# Patient Record
Sex: FEMALE | Race: WHITE | NOT HISPANIC OR LATINO | Employment: OTHER | URBAN - METROPOLITAN AREA
[De-identification: names, ages, dates, MRNs, and addresses within clinical notes are randomized per-mention and may not be internally consistent; named-entity substitution may affect disease eponyms.]

---

## 2017-01-02 ENCOUNTER — HOME CARE/HOSPICE - HEALTHEAST (OUTPATIENT)
Dept: HOME HEALTH SERVICES | Facility: HOME HEALTH | Age: 82
End: 2017-01-02

## 2017-01-03 ENCOUNTER — HOME CARE/HOSPICE - HEALTHEAST (OUTPATIENT)
Dept: HOME HEALTH SERVICES | Facility: HOME HEALTH | Age: 82
End: 2017-01-03

## 2017-01-06 ENCOUNTER — RECORDS - HEALTHEAST (OUTPATIENT)
Dept: ADMINISTRATIVE | Facility: OTHER | Age: 82
End: 2017-01-06

## 2017-01-06 ENCOUNTER — HOME CARE/HOSPICE - HEALTHEAST (OUTPATIENT)
Dept: HOME HEALTH SERVICES | Facility: HOME HEALTH | Age: 82
End: 2017-01-06

## 2017-01-09 ENCOUNTER — HOME CARE/HOSPICE - HEALTHEAST (OUTPATIENT)
Dept: HOME HEALTH SERVICES | Facility: HOME HEALTH | Age: 82
End: 2017-01-09

## 2017-01-13 ENCOUNTER — HOME CARE/HOSPICE - HEALTHEAST (OUTPATIENT)
Dept: HOME HEALTH SERVICES | Facility: HOME HEALTH | Age: 82
End: 2017-01-13

## 2017-01-16 ENCOUNTER — HOME CARE/HOSPICE - HEALTHEAST (OUTPATIENT)
Dept: HOME HEALTH SERVICES | Facility: HOME HEALTH | Age: 82
End: 2017-01-16

## 2017-01-19 ENCOUNTER — HOME CARE/HOSPICE - HEALTHEAST (OUTPATIENT)
Dept: HOME HEALTH SERVICES | Facility: HOME HEALTH | Age: 82
End: 2017-01-19

## 2017-01-23 ENCOUNTER — HOME CARE/HOSPICE - HEALTHEAST (OUTPATIENT)
Dept: HOME HEALTH SERVICES | Facility: HOME HEALTH | Age: 82
End: 2017-01-23

## 2017-01-27 ENCOUNTER — HOME CARE/HOSPICE - HEALTHEAST (OUTPATIENT)
Dept: HOME HEALTH SERVICES | Facility: HOME HEALTH | Age: 82
End: 2017-01-27

## 2017-01-30 ENCOUNTER — HOME CARE/HOSPICE - HEALTHEAST (OUTPATIENT)
Dept: HOME HEALTH SERVICES | Facility: HOME HEALTH | Age: 82
End: 2017-01-30

## 2017-02-03 ENCOUNTER — RECORDS - HEALTHEAST (OUTPATIENT)
Dept: ADMINISTRATIVE | Facility: OTHER | Age: 82
End: 2017-02-03

## 2017-03-15 ENCOUNTER — OFFICE VISIT - HEALTHEAST (OUTPATIENT)
Dept: INTERNAL MEDICINE | Facility: CLINIC | Age: 82
End: 2017-03-15

## 2017-03-15 DIAGNOSIS — I10 ACCELERATED HYPERTENSION: ICD-10-CM

## 2017-03-15 DIAGNOSIS — M81.0 SENILE OSTEOPOROSIS: ICD-10-CM

## 2017-04-07 ENCOUNTER — RECORDS - HEALTHEAST (OUTPATIENT)
Dept: ADMINISTRATIVE | Facility: OTHER | Age: 82
End: 2017-04-07

## 2017-06-29 ENCOUNTER — AMBULATORY - HEALTHEAST (OUTPATIENT)
Dept: INTERNAL MEDICINE | Facility: CLINIC | Age: 82
End: 2017-06-29

## 2017-06-29 DIAGNOSIS — M81.0 SENILE OSTEOPOROSIS: ICD-10-CM

## 2017-07-24 ENCOUNTER — RECORDS - HEALTHEAST (OUTPATIENT)
Dept: ADMINISTRATIVE | Facility: OTHER | Age: 82
End: 2017-07-24

## 2017-08-02 ENCOUNTER — COMMUNICATION - HEALTHEAST (OUTPATIENT)
Dept: INTERNAL MEDICINE | Facility: CLINIC | Age: 82
End: 2017-08-02

## 2017-08-09 ENCOUNTER — RECORDS - HEALTHEAST (OUTPATIENT)
Dept: ADMINISTRATIVE | Facility: OTHER | Age: 82
End: 2017-08-09

## 2017-08-10 ENCOUNTER — COMMUNICATION - HEALTHEAST (OUTPATIENT)
Dept: INTERNAL MEDICINE | Facility: CLINIC | Age: 82
End: 2017-08-10

## 2017-08-15 ENCOUNTER — RECORDS - HEALTHEAST (OUTPATIENT)
Dept: BONE DENSITY | Facility: CLINIC | Age: 82
End: 2017-08-15

## 2017-08-15 ENCOUNTER — OFFICE VISIT - HEALTHEAST (OUTPATIENT)
Dept: INTERNAL MEDICINE | Facility: CLINIC | Age: 82
End: 2017-08-15

## 2017-08-15 ENCOUNTER — RECORDS - HEALTHEAST (OUTPATIENT)
Dept: ADMINISTRATIVE | Facility: OTHER | Age: 82
End: 2017-08-15

## 2017-08-15 DIAGNOSIS — M81.0 AGE-RELATED OSTEOPOROSIS WITHOUT CURRENT PATHOLOGICAL FRACTURE: ICD-10-CM

## 2017-08-15 DIAGNOSIS — I10 ACCELERATED HYPERTENSION: ICD-10-CM

## 2017-08-15 DIAGNOSIS — M48.00 SPINAL STENOSIS: ICD-10-CM

## 2017-08-15 DIAGNOSIS — M81.0 SENILE OSTEOPOROSIS: ICD-10-CM

## 2017-08-15 DIAGNOSIS — Z01.818 PRE-OP EXAM: ICD-10-CM

## 2017-08-15 DIAGNOSIS — I10 HTN (HYPERTENSION): ICD-10-CM

## 2017-08-22 LAB
ATRIAL RATE - MUSE: 66 BPM
DIASTOLIC BLOOD PRESSURE - MUSE: NORMAL MMHG
INTERPRETATION ECG - MUSE: NORMAL
P AXIS - MUSE: 57 DEGREES
PR INTERVAL - MUSE: 188 MS
QRS DURATION - MUSE: 74 MS
QT - MUSE: 398 MS
QTC - MUSE: 417 MS
R AXIS - MUSE: 23 DEGREES
SYSTOLIC BLOOD PRESSURE - MUSE: NORMAL MMHG
T AXIS - MUSE: 25 DEGREES
VENTRICULAR RATE- MUSE: 66 BPM

## 2017-08-24 ENCOUNTER — RECORDS - HEALTHEAST (OUTPATIENT)
Dept: ADMINISTRATIVE | Facility: OTHER | Age: 82
End: 2017-08-24
Payer: COMMERCIAL

## 2017-09-11 ENCOUNTER — RECORDS - HEALTHEAST (OUTPATIENT)
Dept: ADMINISTRATIVE | Facility: OTHER | Age: 82
End: 2017-09-11

## 2017-09-22 ENCOUNTER — OFFICE VISIT - HEALTHEAST (OUTPATIENT)
Dept: INTERNAL MEDICINE | Facility: CLINIC | Age: 82
End: 2017-09-22

## 2017-09-22 DIAGNOSIS — I10 ACCELERATED HYPERTENSION: ICD-10-CM

## 2017-10-06 ENCOUNTER — RECORDS - HEALTHEAST (OUTPATIENT)
Dept: ADMINISTRATIVE | Facility: OTHER | Age: 82
End: 2017-10-06

## 2017-11-01 ENCOUNTER — RECORDS - HEALTHEAST (OUTPATIENT)
Dept: ADMINISTRATIVE | Facility: OTHER | Age: 82
End: 2017-11-01

## 2018-02-15 ENCOUNTER — OFFICE VISIT - HEALTHEAST (OUTPATIENT)
Dept: INTERNAL MEDICINE | Facility: CLINIC | Age: 83
End: 2018-02-15

## 2018-02-15 DIAGNOSIS — M81.0 SENILE OSTEOPOROSIS: ICD-10-CM

## 2018-02-15 DIAGNOSIS — I10 ACCELERATED HYPERTENSION: ICD-10-CM

## 2018-08-07 ENCOUNTER — COMMUNICATION - HEALTHEAST (OUTPATIENT)
Dept: INTERNAL MEDICINE | Facility: CLINIC | Age: 83
End: 2018-08-07

## 2018-08-07 ENCOUNTER — AMBULATORY - HEALTHEAST (OUTPATIENT)
Dept: INTERNAL MEDICINE | Facility: CLINIC | Age: 83
End: 2018-08-07

## 2018-08-07 DIAGNOSIS — M81.0 SENILE OSTEOPOROSIS: ICD-10-CM

## 2018-08-17 ENCOUNTER — AMBULATORY - HEALTHEAST (OUTPATIENT)
Dept: NURSING | Facility: CLINIC | Age: 83
End: 2018-08-17

## 2019-01-29 ENCOUNTER — AMBULATORY - HEALTHEAST (OUTPATIENT)
Dept: INTERNAL MEDICINE | Facility: CLINIC | Age: 84
End: 2019-01-29

## 2019-01-29 DIAGNOSIS — M81.0 SENILE OSTEOPOROSIS: ICD-10-CM

## 2019-04-09 ENCOUNTER — OFFICE VISIT - HEALTHEAST (OUTPATIENT)
Dept: INTERNAL MEDICINE | Facility: CLINIC | Age: 84
End: 2019-04-09

## 2019-04-09 DIAGNOSIS — Z98.890 S/P PARATHYROIDECTOMY: ICD-10-CM

## 2019-04-09 DIAGNOSIS — E66.01 MORBID OBESITY (H): ICD-10-CM

## 2019-04-09 DIAGNOSIS — M81.0 SENILE OSTEOPOROSIS: ICD-10-CM

## 2019-04-09 DIAGNOSIS — I10 ACCELERATED HYPERTENSION: ICD-10-CM

## 2019-04-09 DIAGNOSIS — R41.3 MEMORY LOSS: ICD-10-CM

## 2019-04-09 DIAGNOSIS — Z90.89 S/P PARATHYROIDECTOMY: ICD-10-CM

## 2019-04-09 LAB
ERYTHROCYTE [DISTWIDTH] IN BLOOD BY AUTOMATED COUNT: 13.8 % (ref 11–14.5)
HCT VFR BLD AUTO: 38.1 % (ref 35–47)
HGB BLD-MCNC: 12.8 G/DL (ref 12–16)
MCH RBC QN AUTO: 30.4 PG (ref 27–34)
MCHC RBC AUTO-ENTMCNC: 33.7 G/DL (ref 32–36)
MCV RBC AUTO: 90 FL (ref 80–100)
PLATELET # BLD AUTO: 176 THOU/UL (ref 140–440)
PMV BLD AUTO: 9 FL (ref 7–10)
RBC # BLD AUTO: 4.23 MILL/UL (ref 3.8–5.4)
TSH SERPL DL<=0.005 MIU/L-ACNC: 1.67 UIU/ML (ref 0.3–5)
WBC: 6.4 THOU/UL (ref 4–11)

## 2019-04-09 ASSESSMENT — MIFFLIN-ST. JEOR: SCORE: 1441.29

## 2019-04-10 LAB
25(OH)D3 SERPL-MCNC: 29.6 NG/ML (ref 30–80)
25(OH)D3 SERPL-MCNC: 29.6 NG/ML (ref 30–80)
ALBUMIN SERPL-MCNC: 3.8 G/DL (ref 3.5–5)
ALP SERPL-CCNC: 70 U/L (ref 45–120)
ALT SERPL W P-5'-P-CCNC: 13 U/L (ref 0–45)
ANION GAP SERPL CALCULATED.3IONS-SCNC: 18 MMOL/L (ref 5–18)
AST SERPL W P-5'-P-CCNC: ABNORMAL U/L (ref 0–40)
BILIRUB SERPL-MCNC: 0.5 MG/DL (ref 0–1)
BUN SERPL-MCNC: 19 MG/DL (ref 8–28)
CALCIUM SERPL-MCNC: 10 MG/DL (ref 8.5–10.5)
CALCIUM SERPL-MCNC: 9.9 MG/DL (ref 8.5–10.5)
CHLORIDE BLD-SCNC: 104 MMOL/L (ref 98–107)
CO2 SERPL-SCNC: 20 MMOL/L (ref 22–31)
CREAT SERPL-MCNC: 1.14 MG/DL (ref 0.6–1.1)
CREAT SERPL-MCNC: 1.19 MG/DL (ref 0.6–1.1)
GFR SERPL CREATININE-BSD FRML MDRD: 43 ML/MIN/1.73M2
GFR SERPL CREATININE-BSD FRML MDRD: 46 ML/MIN/1.73M2
GLUCOSE BLD-MCNC: 151 MG/DL (ref 70–125)
PHOSPHATE SERPL-MCNC: ABNORMAL MG/DL (ref 2.5–4.5)
POTASSIUM BLD-SCNC: ABNORMAL MMOL/L (ref 3.5–5)
PROT SERPL-MCNC: 7.2 G/DL (ref 6–8)
PTH-INTACT SERPL-MCNC: 48 PG/ML (ref 10–86)
SODIUM SERPL-SCNC: 142 MMOL/L (ref 136–145)
VIT B12 SERPL-MCNC: NORMAL PG/ML (ref 213–816)

## 2019-04-12 ENCOUNTER — COMMUNICATION - HEALTHEAST (OUTPATIENT)
Dept: INTERNAL MEDICINE | Facility: CLINIC | Age: 84
End: 2019-04-12

## 2019-04-19 ENCOUNTER — HOSPITAL ENCOUNTER (OUTPATIENT)
Dept: MRI IMAGING | Facility: HOSPITAL | Age: 84
Discharge: HOME | End: 2019-04-19
Attending: INTERNAL MEDICINE

## 2019-04-19 DIAGNOSIS — R41.3 MEMORY LOSS: ICD-10-CM

## 2019-04-24 ENCOUNTER — INFUSION - HEALTHEAST (OUTPATIENT)
Dept: INFUSION THERAPY | Facility: CLINIC | Age: 84
End: 2019-04-24

## 2019-04-24 DIAGNOSIS — M81.0 SENILE OSTEOPOROSIS: ICD-10-CM

## 2019-04-24 DIAGNOSIS — S32.030G CLOSED COMPRESSION FRACTURE OF L3 LUMBAR VERTEBRA WITH DELAYED HEALING, SUBSEQUENT ENCOUNTER: ICD-10-CM

## 2019-04-24 LAB
ALBUMIN SERPL-MCNC: 3.8 G/DL (ref 3.5–5)
ALP SERPL-CCNC: 75 U/L (ref 45–120)
ALT SERPL W P-5'-P-CCNC: 14 U/L (ref 0–45)
ANION GAP SERPL CALCULATED.3IONS-SCNC: 9 MMOL/L (ref 5–18)
AST SERPL W P-5'-P-CCNC: 14 U/L (ref 0–40)
BILIRUB SERPL-MCNC: 0.5 MG/DL (ref 0–1)
BUN SERPL-MCNC: 17 MG/DL (ref 8–28)
CALCIUM SERPL-MCNC: 9.6 MG/DL (ref 8.5–10.5)
CHLORIDE BLD-SCNC: 104 MMOL/L (ref 98–107)
CO2 SERPL-SCNC: 26 MMOL/L (ref 22–31)
CREAT SERPL-MCNC: 0.98 MG/DL (ref 0.6–1.1)
GFR SERPL CREATININE-BSD FRML MDRD: 54 ML/MIN/1.73M2
GLUCOSE BLD-MCNC: 100 MG/DL (ref 70–125)
POTASSIUM BLD-SCNC: 3.9 MMOL/L (ref 3.5–5)
PROT SERPL-MCNC: 7.1 G/DL (ref 6–8)
SODIUM SERPL-SCNC: 139 MMOL/L (ref 136–145)

## 2019-06-13 ENCOUNTER — HOSPITAL ENCOUNTER (OUTPATIENT)
Dept: NEUROLOGY | Facility: CLINIC | Age: 84
Setting detail: THERAPIES SERIES
Discharge: STILL ADMITTED OR ADMINISTRATIVE | End: 2019-06-13
Attending: INTERNAL MEDICINE

## 2019-06-13 ENCOUNTER — HOSPITAL ENCOUNTER (OUTPATIENT)
Dept: NEUROLOGY | Facility: CLINIC | Age: 84
Setting detail: THERAPIES SERIES
Discharge: STILL ADMITTED OR ADMINISTRATIVE | End: 2019-06-13

## 2019-06-13 DIAGNOSIS — G31.84 MCI (MILD COGNITIVE IMPAIRMENT): ICD-10-CM

## 2019-06-13 LAB — VIT B12 SERPL-MCNC: <146 PG/ML (ref 213–816)

## 2019-07-31 ENCOUNTER — HOSPITAL ENCOUNTER (OUTPATIENT)
Dept: NEUROLOGY | Facility: CLINIC | Age: 84
Setting detail: THERAPIES SERIES
Discharge: STILL ADMITTED OR ADMINISTRATIVE | End: 2019-07-31
Attending: PSYCHIATRY & NEUROLOGY

## 2019-07-31 DIAGNOSIS — G31.84 MCI (MILD COGNITIVE IMPAIRMENT): ICD-10-CM

## 2019-07-31 DIAGNOSIS — F43.23 ADJUSTMENT DISORDER WITH MIXED ANXIETY AND DEPRESSED MOOD: ICD-10-CM

## 2019-08-05 ENCOUNTER — AMBULATORY - HEALTHEAST (OUTPATIENT)
Dept: OTHER | Facility: CLINIC | Age: 84
End: 2019-08-05

## 2019-08-05 ENCOUNTER — DOCUMENTATION ONLY (OUTPATIENT)
Dept: OTHER | Facility: CLINIC | Age: 84
End: 2019-08-05

## 2019-08-19 ENCOUNTER — RECORDS - HEALTHEAST (OUTPATIENT)
Dept: BONE DENSITY | Facility: CLINIC | Age: 84
End: 2019-08-19

## 2019-08-19 ENCOUNTER — RECORDS - HEALTHEAST (OUTPATIENT)
Dept: ADMINISTRATIVE | Facility: OTHER | Age: 84
End: 2019-08-19

## 2019-08-19 DIAGNOSIS — M81.0 AGE-RELATED OSTEOPOROSIS WITHOUT CURRENT PATHOLOGICAL FRACTURE: ICD-10-CM

## 2019-09-25 ENCOUNTER — OFFICE VISIT - HEALTHEAST (OUTPATIENT)
Dept: INTERNAL MEDICINE | Facility: CLINIC | Age: 84
End: 2019-09-25

## 2019-09-25 DIAGNOSIS — M81.0 SENILE OSTEOPOROSIS: ICD-10-CM

## 2019-09-25 DIAGNOSIS — Z23 NEED FOR INFLUENZA VACCINATION: ICD-10-CM

## 2019-09-25 DIAGNOSIS — Z59.71 INSURANCE COVERAGE PROBLEMS: ICD-10-CM

## 2019-09-25 DIAGNOSIS — R41.3 MEMORY LOSS: ICD-10-CM

## 2019-09-25 DIAGNOSIS — I10 ACCELERATED HYPERTENSION: ICD-10-CM

## 2019-09-25 ASSESSMENT — PATIENT HEALTH QUESTIONNAIRE - PHQ9: SUM OF ALL RESPONSES TO PHQ QUESTIONS 1-9: 3

## 2019-09-26 ENCOUNTER — COMMUNICATION - HEALTHEAST (OUTPATIENT)
Dept: NURSING | Facility: CLINIC | Age: 84
End: 2019-09-26

## 2019-09-27 ENCOUNTER — HOSPITAL ENCOUNTER (OUTPATIENT)
Dept: NEUROLOGY | Facility: CLINIC | Age: 84
Setting detail: THERAPIES SERIES
Discharge: STILL ADMITTED OR ADMINISTRATIVE | End: 2019-09-27
Attending: PSYCHIATRY & NEUROLOGY

## 2019-09-27 DIAGNOSIS — E53.8 B12 DEFICIENCY: ICD-10-CM

## 2019-09-27 LAB — VIT B12 SERPL-MCNC: >2000 PG/ML (ref 213–816)

## 2019-09-30 ENCOUNTER — COMMUNICATION - HEALTHEAST (OUTPATIENT)
Dept: NURSING | Facility: CLINIC | Age: 84
End: 2019-09-30

## 2019-10-03 ENCOUNTER — COMMUNICATION - HEALTHEAST (OUTPATIENT)
Dept: NURSING | Facility: CLINIC | Age: 84
End: 2019-10-03

## 2019-10-03 ASSESSMENT — ACTIVITIES OF DAILY LIVING (ADL): DEPENDENT_IADLS:: TRANSPORTATION

## 2019-10-09 ENCOUNTER — COMMUNICATION - HEALTHEAST (OUTPATIENT)
Dept: NURSING | Facility: CLINIC | Age: 84
End: 2019-10-09

## 2019-10-10 ENCOUNTER — COMMUNICATION - HEALTHEAST (OUTPATIENT)
Dept: CARE COORDINATION | Facility: CLINIC | Age: 84
End: 2019-10-10

## 2019-10-30 ENCOUNTER — COMMUNICATION - HEALTHEAST (OUTPATIENT)
Dept: NURSING | Facility: CLINIC | Age: 84
End: 2019-10-30

## 2019-11-08 ENCOUNTER — COMMUNICATION - HEALTHEAST (OUTPATIENT)
Dept: NURSING | Facility: CLINIC | Age: 84
End: 2019-11-08

## 2019-11-26 ENCOUNTER — COMMUNICATION - HEALTHEAST (OUTPATIENT)
Dept: NURSING | Facility: CLINIC | Age: 84
End: 2019-11-26

## 2020-01-15 ENCOUNTER — COMMUNICATION - HEALTHEAST (OUTPATIENT)
Dept: NURSING | Facility: CLINIC | Age: 85
End: 2020-01-15

## 2020-01-23 ENCOUNTER — COMMUNICATION - HEALTHEAST (OUTPATIENT)
Dept: NURSING | Facility: CLINIC | Age: 85
End: 2020-01-23

## 2020-02-10 ENCOUNTER — COMMUNICATION - HEALTHEAST (OUTPATIENT)
Dept: CARE COORDINATION | Facility: CLINIC | Age: 85
End: 2020-02-10

## 2020-02-26 ENCOUNTER — COMMUNICATION - HEALTHEAST (OUTPATIENT)
Dept: NURSING | Facility: CLINIC | Age: 85
End: 2020-02-26

## 2020-03-13 ENCOUNTER — COMMUNICATION - HEALTHEAST (OUTPATIENT)
Dept: NURSING | Facility: CLINIC | Age: 85
End: 2020-03-13

## 2020-03-18 ENCOUNTER — COMMUNICATION - HEALTHEAST (OUTPATIENT)
Dept: INTERNAL MEDICINE | Facility: CLINIC | Age: 85
End: 2020-03-18

## 2020-04-10 ENCOUNTER — COMMUNICATION - HEALTHEAST (OUTPATIENT)
Dept: CARE COORDINATION | Facility: CLINIC | Age: 85
End: 2020-04-10

## 2020-04-17 ENCOUNTER — COMMUNICATION - HEALTHEAST (OUTPATIENT)
Dept: NURSING | Facility: CLINIC | Age: 85
End: 2020-04-17

## 2020-05-07 ENCOUNTER — COMMUNICATION - HEALTHEAST (OUTPATIENT)
Dept: NURSING | Facility: CLINIC | Age: 85
End: 2020-05-07

## 2020-05-14 ENCOUNTER — COMMUNICATION - HEALTHEAST (OUTPATIENT)
Dept: NURSING | Facility: CLINIC | Age: 85
End: 2020-05-14

## 2020-05-14 ASSESSMENT — ACTIVITIES OF DAILY LIVING (ADL): DEPENDENT_IADLS:: TRANSPORTATION

## 2020-06-09 ENCOUNTER — COMMUNICATION - HEALTHEAST (OUTPATIENT)
Dept: INTERNAL MEDICINE | Facility: CLINIC | Age: 85
End: 2020-06-09

## 2020-06-11 ENCOUNTER — COMMUNICATION - HEALTHEAST (OUTPATIENT)
Dept: NURSING | Facility: CLINIC | Age: 85
End: 2020-06-11

## 2020-07-13 ENCOUNTER — COMMUNICATION - HEALTHEAST (OUTPATIENT)
Dept: NURSING | Facility: CLINIC | Age: 85
End: 2020-07-13

## 2020-08-04 ENCOUNTER — COMMUNICATION - HEALTHEAST (OUTPATIENT)
Dept: CARE COORDINATION | Facility: CLINIC | Age: 85
End: 2020-08-04

## 2020-08-13 ENCOUNTER — COMMUNICATION - HEALTHEAST (OUTPATIENT)
Dept: NURSING | Facility: CLINIC | Age: 85
End: 2020-08-13

## 2020-09-15 ENCOUNTER — COMMUNICATION - HEALTHEAST (OUTPATIENT)
Dept: CARE COORDINATION | Facility: CLINIC | Age: 85
End: 2020-09-15

## 2020-09-21 ENCOUNTER — COMMUNICATION - HEALTHEAST (OUTPATIENT)
Dept: NURSING | Facility: CLINIC | Age: 85
End: 2020-09-21

## 2020-10-09 ENCOUNTER — COMMUNICATION - HEALTHEAST (OUTPATIENT)
Dept: CARE COORDINATION | Facility: CLINIC | Age: 85
End: 2020-10-09

## 2020-10-15 ENCOUNTER — COMMUNICATION - HEALTHEAST (OUTPATIENT)
Dept: NURSING | Facility: CLINIC | Age: 85
End: 2020-10-15

## 2020-11-17 ENCOUNTER — COMMUNICATION - HEALTHEAST (OUTPATIENT)
Dept: NURSING | Facility: CLINIC | Age: 85
End: 2020-11-17

## 2020-11-24 ENCOUNTER — COMMUNICATION - HEALTHEAST (OUTPATIENT)
Dept: CARE COORDINATION | Facility: CLINIC | Age: 85
End: 2020-11-24

## 2020-11-25 ASSESSMENT — ACTIVITIES OF DAILY LIVING (ADL): DEPENDENT_IADLS:: INDEPENDENT

## 2020-12-02 ENCOUNTER — COMMUNICATION - HEALTHEAST (OUTPATIENT)
Dept: NURSING | Facility: CLINIC | Age: 85
End: 2020-12-02

## 2020-12-16 ENCOUNTER — COMMUNICATION - HEALTHEAST (OUTPATIENT)
Dept: NURSING | Facility: CLINIC | Age: 85
End: 2020-12-16

## 2021-01-12 ENCOUNTER — COMMUNICATION - HEALTHEAST (OUTPATIENT)
Dept: NURSING | Facility: CLINIC | Age: 86
End: 2021-01-12

## 2021-01-13 ENCOUNTER — COMMUNICATION - HEALTHEAST (OUTPATIENT)
Dept: INTERNAL MEDICINE | Facility: CLINIC | Age: 86
End: 2021-01-13

## 2021-01-13 ENCOUNTER — COMMUNICATION - HEALTHEAST (OUTPATIENT)
Dept: CARE COORDINATION | Facility: CLINIC | Age: 86
End: 2021-01-13

## 2021-01-13 DIAGNOSIS — H90.8 MIXED CONDUCTIVE AND SENSORINEURAL HEARING LOSS, UNSPECIFIED LATERALITY: ICD-10-CM

## 2021-01-13 ASSESSMENT — ACTIVITIES OF DAILY LIVING (ADL): DEPENDENT_IADLS:: INDEPENDENT

## 2021-01-14 ENCOUNTER — DOCUMENTATION ONLY (OUTPATIENT)
Dept: OTHER | Facility: CLINIC | Age: 86
End: 2021-01-14

## 2021-01-14 ENCOUNTER — AMBULATORY - HEALTHEAST (OUTPATIENT)
Dept: OTHER | Facility: CLINIC | Age: 86
End: 2021-01-14

## 2021-02-17 ENCOUNTER — COMMUNICATION - HEALTHEAST (OUTPATIENT)
Dept: CARE COORDINATION | Facility: CLINIC | Age: 86
End: 2021-02-17

## 2021-03-09 ENCOUNTER — AMBULATORY - HEALTHEAST (OUTPATIENT)
Dept: NURSING | Facility: CLINIC | Age: 86
End: 2021-03-09

## 2021-03-19 ENCOUNTER — COMMUNICATION - HEALTHEAST (OUTPATIENT)
Dept: CARE COORDINATION | Facility: CLINIC | Age: 86
End: 2021-03-19

## 2021-03-30 ENCOUNTER — AMBULATORY - HEALTHEAST (OUTPATIENT)
Dept: NURSING | Facility: CLINIC | Age: 86
End: 2021-03-30

## 2021-04-07 ENCOUNTER — OFFICE VISIT - HEALTHEAST (OUTPATIENT)
Dept: AUDIOLOGY | Facility: CLINIC | Age: 86
End: 2021-04-07

## 2021-04-07 ENCOUNTER — RECORDS - HEALTHEAST (OUTPATIENT)
Dept: ADMINISTRATIVE | Facility: OTHER | Age: 86
End: 2021-04-07

## 2021-04-07 DIAGNOSIS — H90.3 SENSORINEURAL HEARING LOSS (SNHL) OF BOTH EARS: ICD-10-CM

## 2021-04-07 DIAGNOSIS — H93.13 TINNITUS OF BOTH EARS: ICD-10-CM

## 2021-04-16 ENCOUNTER — COMMUNICATION - HEALTHEAST (OUTPATIENT)
Dept: NURSING | Facility: CLINIC | Age: 86
End: 2021-04-16

## 2021-04-29 ENCOUNTER — COMMUNICATION - HEALTHEAST (OUTPATIENT)
Dept: NURSING | Facility: CLINIC | Age: 86
End: 2021-04-29

## 2021-05-03 ENCOUNTER — COMMUNICATION - HEALTHEAST (OUTPATIENT)
Dept: CARE COORDINATION | Facility: CLINIC | Age: 86
End: 2021-05-03

## 2021-05-14 ENCOUNTER — COMMUNICATION - HEALTHEAST (OUTPATIENT)
Dept: NURSING | Facility: CLINIC | Age: 86
End: 2021-05-14

## 2021-05-14 ENCOUNTER — OFFICE VISIT - HEALTHEAST (OUTPATIENT)
Dept: AUDIOLOGY | Facility: CLINIC | Age: 86
End: 2021-05-14

## 2021-05-14 DIAGNOSIS — H90.3 SENSORINEURAL HEARING LOSS (SNHL) OF BOTH EARS: ICD-10-CM

## 2021-05-26 ENCOUNTER — COMMUNICATION - HEALTHEAST (OUTPATIENT)
Dept: NURSING | Facility: CLINIC | Age: 86
End: 2021-05-26

## 2021-05-26 ASSESSMENT — PATIENT HEALTH QUESTIONNAIRE - PHQ9: SUM OF ALL RESPONSES TO PHQ QUESTIONS 1-9: 3

## 2021-05-27 NOTE — PROGRESS NOTES
Assessment/Plan:        1. Osteoporosis Senile  DXA Bone Density Scan    Ambulatory referral to Infusion Therapy   2. Memory loss  Ambulatory referral to Dementia/Memory Loss Clinic    HM2(CBC w/o Differential)    Thyroid Stimulating Hormone (TSH)    Vitamin D, Total (25-Hydroxy)    Comprehensive Metabolic Panel    Magnesium    Vitamin B12    Parathyroid Hormone Intact with Minerals    MR Brain With Without Contrast   3. Accelerated hypertension  hydrALAZINE (APRESOLINE) 25 MG tablet    HM2(CBC w/o Differential)    Thyroid Stimulating Hormone (TSH)    Vitamin D, Total (25-Hydroxy)    Comprehensive Metabolic Panel    Magnesium    Vitamin B12    Parathyroid Hormone Intact with Minerals   4. Morbid obesity (H)     5. S/P parathyroidectomy (H)  HM2(CBC w/o Differential)    Thyroid Stimulating Hormone (TSH)    Vitamin D, Total (25-Hydroxy)    Comprehensive Metabolic Panel    Magnesium    Vitamin B12    Parathyroid Hormone Intact with Minerals     1. Osteoporosis - she will schedule Reclast infusion. She never had bisphosphonate treatment in the past. She will also check with [facility] if they have and patient support program for Prolia in the future.  2. HTN - she will take hydralazine 3x/day since I know she was able to tolerate it and will stop Caliritin for a week. Then will retry Benicar. I think that maybe antihistaminic is making her confusion symptoms. We will cehck labs. She will see Memory loss clinic and have MRI head done.    This note has been dictated using voice recognition software. Any grammatical or context distortions are unintentional and inherent to the software.      Return in about 3 weeks (around 4/30/2019) for Annual physical.    Patient Instructions   We will switch to Reclast infusion once a year. You should call infusion center to schedule.  Take Tylenol 2 pills 1 hour before infusion and 2 pills 4-6 hours after infusion.    Take hydralazine 25 mg 3x/day.    To see Memory loss clinic.    To  "schedule DXA scan in August 2019.    To schedule MRI head.    Stop Clairitin and restart Benicar if Claritin is \" guilty\" for confusion and other symptoms.    Labs today.          Subjective:    Vanda Sanchez is a 83 y.o. female  here for    Chief Complaint   Patient presents with     Osteoporosis Follow Up     Alternative to prolia     Altered Mental Status     Stopped bp meds and resolved     Multiple issues today:  1. Osteoporosis - last prolia done in August 2018. She did not have the dose in February 2019, because was told by her insurance that she has to pay 1200$.   2. HTN, unstable - she stopped Benicar few weeks ago, because was convinced that is giving her more confusion. Hydralazine per her opinion manage her BP for 15 min only and she does not take it. She could not tolerate amlodipine, atenolol, carvedilol, clonidine, enalapril, HCTZ, lisinopril.  3.More confusion and difficulty managing daily activities, low energy level over the last few months, which all got better since she stopped Benicar. She is also taking Claritin daily for sinus allergy symptoms.  4. She had parathyroidectomy few years ago. Taking calcium and vit D now.    Social History     Socioeconomic History     Marital status: Single     Spouse name: Not on file     Number of children: Not on file     Years of education: Not on file     Highest education level: Not on file   Occupational History     Not on file   Social Needs     Financial resource strain: Not on file     Food insecurity:     Worry: Not on file     Inability: Not on file     Transportation needs:     Medical: Not on file     Non-medical: Not on file   Tobacco Use     Smoking status: Never Smoker     Smokeless tobacco: Never Used   Substance and Sexual Activity     Alcohol use: No     Drug use: No     Sexual activity: Never   Lifestyle     Physical activity:     Days per week: Not on file     Minutes per session: Not on file     Stress: Not on file   Relationships     " "Social connections:     Talks on phone: Not on file     Gets together: Not on file     Attends Orthodox service: Not on file     Active member of club or organization: Not on file     Attends meetings of clubs or organizations: Not on file     Relationship status: Not on file     Intimate partner violence:     Fear of current or ex partner: Not on file     Emotionally abused: Not on file     Physically abused: Not on file     Forced sexual activity: Not on file   Other Topics Concern     Not on file   Social History Narrative     Not on file       Family History   Problem Relation Age of Onset     Cancer Mother      Diabetes Mother      Cancer Father      Heart disease Father      Cancer Sister      Hypertension Sister      Cancer Brother      Heart disease Brother      Cancer Maternal Grandmother      Review of Systems:     A 12 point comprehensive review of systems was negative except as noted in HPI.            Objective:    Physical Exam   /82 (Patient Site: Right Arm, Patient Position: Sitting, Cuff Size: Adult Large)   Pulse 84   Ht 5' 5.5\" (1.664 m)   Wt 217 lb 11.2 oz (98.7 kg)   BMI 35.68 kg/m      Constitutional: oriented to person, place, and time, appears well-nourished. No distress.   HENT:   Head: Normocephalic.   Mouth/Throat: Oropharynx is clear and moist.   Eyes: Conjunctivae are normal. Pupils are equal, round, and reactive to light.   Neck: Normal range of motion. Neck supple.   Cardiovascular: Normal rate, regular rhythm and normal heart sounds.    Pulmonary/Chest: Effort normal and breath sounds normal.  Abdominal: Soft. Bowel sounds are normal.   Musculoskeletal: Normal range of motion.   Neurological: alert and oriented to person, place, and time.  Psychiatric:  normal mood and affect.    Patient Active Problem List   Diagnosis     Adenoma Of The Parathyroid Gland     Osteoporosis Senile     S/P parathyroidectomy (H)     Lower extremity weakness     Low back pain     Accelerated " hypertension     Compression fracture of L3 lumbar vertebra (H)     Obesity (BMI 35.0-39.9) with comorbidity (H)       Current Outpatient Medications on File Prior to Visit   Medication Sig Dispense Refill     calcium carbonate 300 mg (750 mg) Chew Chew 750 mg.       cholecalciferol, vitamin D3, (VITAMIN D3) 2,000 unit Tab Take by mouth.       cholecalciferol, vitamin D3, 1,000 unit tablet Take 2,000 Units by mouth daily.       olmesartan (BENICAR) 40 MG tablet Take 1 tablet (40 mg total) by mouth daily. 90 tablet 3     [DISCONTINUED] hydrALAZINE (APRESOLINE) 25 MG tablet Take 1 tablet (25 mg total) by mouth 3 (three) times a day. 270 tablet 3     Current Facility-Administered Medications on File Prior to Visit   Medication Dose Route Frequency Provider Last Rate Last Dose     denosumab 60 mg (PROLIA 60 mg/ml)  60 mg Subcutaneous Q6 Months Adonis Saunders MD   60 mg at 08/17/18 1240     [DISCONTINUED] denosumab 60 mg (PROLIA 60 mg/ml)  60 mg Subcutaneous Q6 Months Adonis Saunders MD Maja Visekruna  4/9/2019

## 2021-05-27 NOTE — PATIENT INSTRUCTIONS - HE
"We will switch to Reclast infusion once a year. You should call infusion center to schedule.  Take Tylenol 2 pills 1 hour before infusion and 2 pills 4-6 hours after infusion.    Take hydralazine 25 mg 3x/day.    To see Memory loss clinic.    To schedule DXA scan in August 2019.    To schedule MRI head.    Stop Clairitin and restart Benicar if Claritin is \" guilty\" for confusion and other symptoms.    Labs today.  "

## 2021-05-28 NOTE — PROGRESS NOTES
"Patient arrived ambulatory this afternoon for Reclast infusion as prescribed. Noted B/P elevated upon arrival/ b/p rechecked after patient had rested in chair and noted improved. Patient reports she has not been taking her b/p medication at home and reports \"it makes my blood pressure go higher\"/ Patient strongly encouraged to call Dr. Saunders for futher follow up/evaluation and she agrees.  Labs drawn as ordered and reviewed. Patient tolerated Reclast infusion. Patient observed for approximately 15-20 minutes after infusion with no complaints/no signs of adverse reaction. IV removed from site and dressing applied. AVS printed and reviewed with patient. Patient encouraged to also confirm with Dr. Saunders regarding oral calcium supplement as she is currently taking (750MG calcium supplement at home daily- recommendation per therapy plan is 500mg calcium two times a day) and patient agrees. Patient reports she does have an appointment with Dr. Saunders next week. Patient discharged to home ambulatory with a friend as .   "

## 2021-05-28 NOTE — PATIENT INSTRUCTIONS - HE
Patient Education   Zoledronic Acid Solution for injection  What is this medicine?  ZOLEDRONIC ACID (SAMANTHA le rita ik AS id) lowers the amount of calcium loss from bone. It is used to treat Paget's disease and osteoporosis in women.  This medicine may be used for other purposes; ask your health care provider or pharmacist if you have questions.  What should I tell my health care provider before I take this medicine?  They need to know if you have any of these conditions:    aspirin-sensitive asthma    cancer, especially if you are receiving medicines used to treat cancer    dental disease or wear dentures    infection    kidney disease    low levels of calcium in the blood    past surgery on the parathyroid gland or intestines    receiving corticosteroids like dexamethasone or prednisone    an unusual or allergic reaction to zoledronic acid, other medicines, foods, dyes, or preservatives    pregnant or trying to get pregnant    breast-feeding  How should I use this medicine?  This medicine is for infusion into a vein. It is given by a health care professional in a hospital or clinic setting.  Talk to your pediatrician regarding the use of this medicine in children. This medicine is not approved for use in children.  Overdosage: If you think you have taken too much of this medicine contact a poison control center or emergency room at once.  NOTE: This medicine is only for you. Do not share this medicine with others.  What if I miss a dose?  It is important not to miss your dose. Call your doctor or health care professional if you are unable to keep an appointment.  What may interact with this medicine?    certain antibiotics given by injection    NSAIDs, medicines for pain and inflammation, like ibuprofen or naproxen    some diuretics like bumetanide, furosemide    teriparatide  This list may not describe all possible interactions. Give your health care provider a list of all the medicines, herbs, non-prescription  drugs, or dietary supplements you use. Also tell them if you smoke, drink alcohol, or use illegal drugs. Some items may interact with your medicine.  What should I watch for while using this medicine?  Visit your doctor or health care professional for regular checkups. It may be some time before you see the benefit from this medicine. Do not stop taking your medicine unless your doctor tells you to. Your doctor may order blood tests or other tests to see how you are doing.  Women should inform their doctor if they wish to become pregnant or think they might be pregnant. There is a potential for serious side effects to an unborn child. Talk to your health care professional or pharmacist for more information.  You should make sure that you get enough calcium and vitamin D while you are taking this medicine. Discuss the foods you eat and the vitamins you take with your health care professional.  Some people who take this medicine have severe bone, joint, and/or muscle pain. This medicine may also increase your risk for jaw problems or a broken thigh bone. Tell your doctor right away if you have severe pain in your jaw, bones, joints, or muscles. Tell your doctor if you have any pain that does not go away or that gets worse.  Tell your dentist and dental surgeon that you are taking this medicine. You should not have major dental surgery while on this medicine. See your dentist to have a dental exam and fix any dental problems before starting this medicine. Take good care of your teeth while on this medicine. Make sure you see your dentist for regular follow-up appointments.  What side effects may I notice from receiving this medicine?  Side effects that you should report to your doctor or health care professional as soon as possible:    allergic reactions like skin rash, itching or hives, swelling of the face, lips, or tongue    anxiety, confusion, or depression    breathing problems    changes in vision    eye  pain    feeling faint or lightheaded, falls    jaw pain, especially after dental work    mouth sores    muscle cramps, stiffness, or weakness    trouble passing urine or change in the amount of urine  Side effects that usually do not require medical attention (report to your doctor or health care professional if they continue or are bothersome):    bone, joint, or muscle pain    constipation    diarrhea    fever    hair loss    irritation at site where injected    loss of appetite    nausea, vomiting    stomach upset    trouble sleeping    trouble swallowing    weak or tired  This list may not describe all possible side effects. Call your doctor for medical advice about side effects. You may report side effects to FDA at 8-360-FDA-2518.  Where should I keep my medicine?  This drug is given in a hospital or clinic and will not be stored at home.  NOTE:This sheet is a summary. It may not cover all possible information. If you have questions about this medicine, talk to your doctor, pharmacist, or health care provider. Copyright  2015 Gold Standard
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
## 2021-05-29 NOTE — PROGRESS NOTES
8/22/17 note: Patient noted to have hyponatremia associated with low pre-albumin.  I suspect poor oral intake is in large part due to the patient's low serum sodium.  I did discuss this matter with staff and I have asked that the patient's facility be contacted so as to alert the patient's primary physician.  In the meantime I have contacted the patient's daughter to discuss this matter as well.
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
## 2021-05-30 VITALS — BODY MASS INDEX: 32.84 KG/M2 | WEIGHT: 209.7 LBS

## 2021-05-31 VITALS — WEIGHT: 215 LBS | BODY MASS INDEX: 33.67 KG/M2

## 2021-05-31 VITALS — BODY MASS INDEX: 33.88 KG/M2 | WEIGHT: 216.3 LBS

## 2021-05-31 NOTE — PROGRESS NOTES
The patient was seen for a neuropsychological evaluation for the purposes of diagnostic clarification and treatment planning. 130 minutes of face-to-face testing were provided by this writer. An additional 50 minutes were spent scoring and compiling test results.The patient was cooperative with testing. No concerns were brought to my attention. Please see Dr. Harvey's report for a detailed description of the charges and interpretation and integration of the findings.
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
## 2021-06-01 VITALS — WEIGHT: 217.3 LBS | BODY MASS INDEX: 34.03 KG/M2

## 2021-06-01 NOTE — PROGRESS NOTES
The Clinic Care Guide spoke with the patient over the phone at the request of the PCP to discuss possible clinic care coordination enrollment. Clinic care coordination was described to the patient and immediate needs were discussed. The patient agreed to enrollment in clinic care coordination and future appointments were scheduled for an RN care coordination assessment on 10/3/19 (by phone due to hard to get around.)       She is calling to see about dental and she thinks that medicare will pay for it because she has had   But patient expressed that it is hard for her to get rides sometimes to the clinic

## 2021-06-01 NOTE — PROGRESS NOTES
Assessment/Plan:        1. Accelerated hypertension     2. Osteoporosis Senile     3. Insurance coverage problems  Ambulatory referral to Care Management (Primary Care)   4. Need for influenza vaccination  Influenza High Dose, Seasonal 65+ yrs   5. Memory loss         Return in about 6 months (around 3/25/2020) for Annual physical.    Patient Instructions   DXA in 8/2021.  We will see each other in 6 months. You will have new insurance then and we can see if we can do Prolia again.    Our Clinic  will call you to help with insurance questions.    Flu shot today.      Chief Complaint   Patient presents with     Test Results     Insurance Question's     Multiple issues today:  1. Osteoporosis - last prolia done in August 2018. She did not have the dose in February 2019, because was told by her insurance that she has to pay 1200$. AT that time we switched to reclast, and she had infusion in April 2019. She reports severe myalgia and all over the body pain for 3 months. She does not want to have Reclast again. She hopes she will have new insurance next year and we can get back on Prolia treatment.  2. HTN, BP looks good today - she decided to stop all her medications and she is not taking Benicar and hydralazine for months. She could not tolerate amlodipine, atenolol, carvedilol, clonidine, enalapril, HCTZ, lisinopril.  3.Memory loss - she completed neuropsych testing and will see Dr Vines in 2 days.  4. She had parathyroidectomy few years ago. Taking calcium and vit D now.  5. She has problem with establishing dental insurance. There is a lot of misunderstanding why Medicare cannot cover her dental work. I will connect her with our .    /70   Pulse 81   Wt 216 lb 6.4 oz (98.2 kg)   SpO2 97%   BMI 35.46 kg/m    25 minutes spent with the patient and more then 50 % of the time in counseling.  This note has been dictated using voice recognition software. Any grammatical or context  distortions are unintentional and inherent to the software      Patient Active Problem List   Diagnosis     Adenoma Of The Parathyroid Gland     Osteoporosis Senile     S/P parathyroidectomy (H)     Accelerated hypertension     Compression fracture of L3 vertebra (H)     Obesity (BMI 35.0-39.9) with comorbidity (H)     Memory loss       Current Outpatient Medications on File Prior to Visit   Medication Sig Dispense Refill     calcium carbonate 300 mg (750 mg) Chew Chew 750 mg.       cholecalciferol, vitamin D3, (VITAMIN D3) 2,000 unit Tab Take by mouth.       [DISCONTINUED] cholecalciferol, vitamin D3, 1,000 unit tablet Take 2,000 Units by mouth daily.       [DISCONTINUED] hydrALAZINE (APRESOLINE) 25 MG tablet Take 1 tablet (25 mg total) by mouth 3 (three) times a day. 270 tablet 3     [DISCONTINUED] olmesartan (BENICAR) 40 MG tablet Take 1 tablet (40 mg total) by mouth daily. 90 tablet 3     Current Facility-Administered Medications on File Prior to Visit   Medication Dose Route Frequency Provider Last Rate Last Dose     [DISCONTINUED] denosumab 60 mg (PROLIA 60 mg/ml)  60 mg Subcutaneous Q6 Months Adonis Saunders MD   60 mg at 08/17/18 4540

## 2021-06-01 NOTE — PROGRESS NOTES
Assessment / Impression   1.  Concern regarding acquired cognitive impairment  2.  No cognitive diagnosis per current evaluation  3.  B12 deficiency currently on B12 replacement  4.  History of mild gait instability improved on B12 replacement      Plan:   1.  Recheck B12 level to document adequate oral absorption  2.  Wellness programming  3.  Patient is to call or return with any concerns regarding changes in cognition  4.  In 1 year the patient will return at which time we will have a further conversation as to whether repeat neuropsychometric testing or further cognitive assessment is warranted.    Patient returns for results of neuropsychometric testing.  She is accompanied by her friend Tamara.  Prior imaging of the brain was fairly nonspecific.  Screening blood test did reveal a low B12 level for which the patient is currently taking oral replacement.  Neuropsychometric testing is within normal limits with average to superior performances on all cognitive test provided.  I explained these test results and the limitations of the diagnostic assessment done to date.  At this point in time I am not recommending further biomarker testing.  The patient has not had a repeat vitamin B12 level drawn and this will be drawn today.  Of interest, she does report feeling better cognitively and has noted improvement in motor function since beginning B12 replacement.    Total time for evaluation one hour with majority time spent counseling.      Subjective:     HPI: Vanda Sanchez is a 84 y.o. female with above-noted diagnoses who returns for follow-up.  The patient reports demonstrating improvement in strength and energy since beginning B12 replacement orally.  She also reports improvement cognitively since discontinuing antihypertensive therapies earlier this year.  No new complaints are noted.          Review of Systems:          As above        Objective:     Vitals:    09/27/19 1255 09/27/19 1258 09/27/19 1300   BP:  (!) 245/111 (!) 258/125 (!) 234/110   Pulse: 72 80 79       No results found for this or any previous visit (from the past 24 hour(s)).    Physical Exam: Deferred today.  From a cognitive perspective, however, the patient is neatly groomed casually dressed and seated for evaluation.  She is alert attentive and socially appropriate.  No clear evidence of cognitive difficulties noted during conversation.  Affect is pleasant mood would appear to be congruent.

## 2021-06-01 NOTE — PROGRESS NOTES
Scheduled Follow Up Call: Attempt 1 Community Health Worker called and left a message for the patient. If the patient is returning my call, please transfer the patient to [place] at ext. [phone].

## 2021-06-01 NOTE — PATIENT INSTRUCTIONS - HE
DXA in 8/2021.  We will see each other in 6 months. You will have new insurance then and we can see if we can do Prolia again.    Our Clinic  will call you to help with insurance questions.    Flu shot today.

## 2021-06-02 VITALS — WEIGHT: 217.7 LBS | BODY MASS INDEX: 34.99 KG/M2 | HEIGHT: 66 IN

## 2021-06-02 NOTE — PROGRESS NOTES
Denae CHW sent the patein the DDS application to loot at and fill out to see if she can get help with her dental work she needs done.    Next outreach 10/29/19

## 2021-06-02 NOTE — PROGRESS NOTES
Clinic Care Coordination Contact    Follow Up Progress Note      Assessment:CCC MARVA consulted with Medicare Specialist through the [employer] ([facility]) on pt s coverage for dental work due to Adenoma of the parathyroid. Worker informed that surgery for the condition is covered (removal of a tumor on the gland and/or gland removal) but unclear if dental needs related are.    Worker recommending that the pt get connected with a surgeon for the procedure she s needing (also suggested she contact the secondary insurance to find a doctor that is in network) and work with their team on this as the doctor will need to bill/code the procedure a certain way in order for Medicare and the secondary to cover it.     SW called pt and discussed what SW learned from SLL. Pt reported she has a doctor she s been working with and will contact them to discuss this further. SW explained to pt that SW would suggest this doctor submit a prior authorization to check on coverage for the procedure.      Goals addressed this encounter:   Goals        Patient Stated      Financial Wellbeing (pt-stated)      Goal Statement-I would like assistance finding out what my coverage is for dental surgery due to a parathyroid condition within the next 2 months.    Action steps to achieve this goal  1.  I will contact the doctor's office where I will be getting my dental work done and request a prior authorization for the procedures I need be sent into my insurance to check if they would be covered.  2. I will look at the Donated dental service application that my CHW sent me.     Updated 10/10/19  Date goal set: 10/3/19            Psychosocial (pt-stated)      Goal Statement- I will decide in the next 2 months if I would like to pursue getting in-home services and transportation through [employer].     Action steps to achieve this goal  1.  The  has mailed me information for the county waiver programs and for [employer], I will  review this information.  2.  I will notify the Community Health Worker if I decide I would like to pursue either service.    Date goal set: 10/3/19                Intervention/Education provided during outreach: F/U on how to proceed with finding out if pt's dental work will be covered by Medicare.     Plan: Pt will f/u with the doctor that will be doing the dental procedures to request a prior authorization.    Care Coordinator will follow up as needed.

## 2021-06-03 VITALS
HEART RATE: 81 BPM | BODY MASS INDEX: 35.46 KG/M2 | WEIGHT: 216.4 LBS | SYSTOLIC BLOOD PRESSURE: 130 MMHG | DIASTOLIC BLOOD PRESSURE: 70 MMHG | OXYGEN SATURATION: 97 %

## 2021-06-03 NOTE — PROGRESS NOTES
Discussion,   Patient is doing ok she did not want to call all the clinic the CHW provided so she is meeting with an  on 11/13/19 to pick a different plan..       Patient stated that she would like to do mychart now to communicate with the CHW       Next outreach 11/21/19

## 2021-06-05 NOTE — PROGRESS NOTES
Discussion,   Patient stated that she would be having issue calling and talking to the dental offices and they have not been nice to her. CHW offered patient to come in and the CHW to help and assist and the patient did not want to intell her appointment in march due to transportation but the patient did not want to work with [employer] or work on any other things on transportation.       Next outreach  1/23/2020

## 2021-06-05 NOTE — PROGRESS NOTES
Scheduled Follow Up Call: Attempt 1 Community Health Worker called and left a message for the patient. If the patient is returning my call, please transfer the patient to [place] at ext. [phone].

## 2021-06-05 NOTE — PROGRESS NOTES
"Clinic Care Coordination Contact    Situation: Patient chart reviewed by care coordinator.    Background: Pt was enrolled by [facility] MARVA on 10/03/2019 with goals around [employer] and assistance navigating insurance coverage for dental surgery. MARVA had consulted with a Medicare Specialist on 10/10/2019 regarding Medicare Part B coverage for dental surgery needs due to a medical condition. Worker recommended that the pt first find a dental surgeon for the procedure she's needing and request that surgeon's team submit a prior authorization to check Medicare Part B coverage due to a medical condition (pt's condition is adenoma of the parathyroid causing issues with her teeth).    Assessment: at 01/23/2020 CHW outreach pt declined pursuing [employer], pt reported she was struggling with calling dental offices to find a surgeon for the procedure she's needing and to submit the prior authorization.    Plan/Recommendations: CHW has consulted with MARVA on this, pt may need assistance with calling the clinics to explain what she is needing--pt has self-reported that due to her declining cognitive abilities she is \"slow\" and struggles to communicate at times. MARVA also recommended considering the following resource to assist:    [place] [employer]  Directory of Professionals - Dentists  [address], [address], [place], [place], [address] [phone]  Maintains a list or lookup of resources to help you find professionals who have specialized service offerings  Use the Find-a-Dentist database to locate a dentist in your area.  Payment   Free / no cost to everyone   8:00am - 4:30pm, Monday - Friday            "

## 2021-06-06 ENCOUNTER — HEALTH MAINTENANCE LETTER (OUTPATIENT)
Age: 86
End: 2021-06-06

## 2021-06-06 NOTE — TELEPHONE ENCOUNTER
Left voicemail for patient to return call to clinic. When patient returns call, please give them below message.      Appointment for 3-26-20 has been cancelled due to Covid-19 Please reschedule for sometime between July and August.

## 2021-06-06 NOTE — PROGRESS NOTES
Clinic Care Coordination Contact    Follow Up Progress Note      Next Outreach Date: 4/13/20  Planned Outreach Frequency: Monthly  Preferred Number: [phone]  RN Assessment Date: 10/3/19  RN Only Goals: None  SW Only Goals: None  Goals        Patient Stated      Financial Wellbeing (pt-stated)      Goal Statement- I would like help in navigating my dental coverage so I can get my teeth pulled.    Action steps.   1. I will contact the Dental school to see if I can get in to get some teeth pulled.   2. My CHW will connect this the [facility] SW to see if she knows any new information. Done no new informtion   3. I will meet with the CHW on 3/26/2020 to call and go over thing to make an appointment to help call the Dental offices. I have canceled this appointment for now. I am needing to work on getting my blood pressure lowered at this time.        Updated: 3/13/2020  Date goal set: 10/3/19              Goals addressed this encounter:   Plan:   Patient is meeting with Dr. Saunders on 3/26/20 to discuss lowering her blood pressure.    Care Coordinator will follow up in 1 month

## 2021-06-06 NOTE — PROGRESS NOTES
Clinic Care Coordination Contact  [facility]/Voicemail       Clinical Data: Care Coordinator Outreach  Outreach attempted x 1.  Left message on patient's voicemail with call back information and requested return call.  Plan:  CHW to give patient resource for   [place] [employer]  Directory of Professionals - Dentists  [address], [address], [place], [place], [address] [phone]    CHW to complete goal because we have give the patient all the resources for low cost dentist.      Care Coordinator will try to reach patient again in 10 business days.  3/11/2020

## 2021-06-07 NOTE — PROGRESS NOTES
Clinic Care Coordination Contact       Situation: Patient chart reviewed by care coordinator.     Background: Pts initial assessment and enrollment to Care Coordination was 1010/19.   Patient centered goals were developed with participation from patient.  SW CC handed patient off to CHW for continued outreach every 30 days.      Assessment: CHW has been in contact with patient monthly. Patient has made progress to goals.  Patient not currently wanting to work on dental goal. At last outreach pt requesting to place goal on hold.     Plan/Recommendations: CHW will involve SW CC as needed or if patient is ready to move to maintenance.    SW CC will continue to monitor progress to goals and CHW outreaches every 6 weeks.     Complex Care Plan updated and mailed to patient: yes    Goals        Patient Stated      Financial Wellbeing (pt-stated)      Goal Statement- I would like help in navigating my dental coverage so I can get my teeth pulled.    Action steps.   1. I will contact the Dental school to see if I can get in to get some teeth pulled.   2. My CHW will connect this the [facility] SW for resource information if needed  3. I will meet with the CHW or SW when I am ready to work on this goal        Updated: 3/13/2020, 4/10/20  Date goal set: 10/3/19

## 2021-06-07 NOTE — PROGRESS NOTES
Clinic Care Coordination Contact  [facility]/Voicemail    Clinical Data: Care Coordinator Outreach  Outreach attempted x 1.  Left message on patient's voicemail with call back information and requested return call.  Plan: Care Coordinator will try to reach patient again in 2 weeks.    Next Outreach Date: 5/1/20  Planned Outreach Frequency: Monthly  Preferred Number: [phone]  RN Assessment Date: 10/3/19  RN Only Goals: None  SW Only Goals: None

## 2021-06-08 NOTE — TELEPHONE ENCOUNTER
Who is calling:  Patient  Reason for Call:  Patient was referred by , Imelda to be seen by Dr. Acosta.  Please contact patient for next route of care  Date of last appointment with primary care: NA  Okay to leave a detailed message: Yes

## 2021-06-08 NOTE — TELEPHONE ENCOUNTER
She was referred to the Functional Medicine program- this program has out of pocket cost. My consult can be billed to insurance.  I am not sure this is what she is looking for, but I can have Ana reach out to her to discuss further. This involves being willing to change diet/ work with mind body techniques and use of supplements.

## 2021-06-08 NOTE — PROGRESS NOTES
Clinic Care Coordination Contact    Community Health Worker Follow Up    Goals:   Goals Addressed                 This Visit's Progress       Patient Stated      Financial Wellbeing (pt-stated)   On track     Goal Statement- I would like help in navigating my dental coverage so I can get my teeth pulled.    Action steps.   1. I will contact the Dental school to see if I can get in to get some teeth pulled.   2. My CHW will connect this the [facility] SW for resource information if needed  3. I will meet with the CHW or SW when I am ready to work on this goal    (Hold due to COVID-19, 5/7/20)    Date goal set: 10/3/19    Updated: 5/7/20        Other (pt-stated)   On track     Goal Statement: I would like to talk to the  regarding services in my home.    Date Goal set: 5/7/20  Barriers: Knowledge of the system  Strengths: Motivation to learn  Date to Achieve By: 6/1/20  Patient expressed understanding of goal: Yes    Action steps to achieve this goal:  1. I will answer my phone when the SW calls on 5/14/20  2. I will inform my CHW if I need to reschedule my appointment          Discussion: The CHW was able to follow up with the patient. The patient has concerns regarding the changes that are happening since she is aging. She would like to make sure that she is getting the care in her health and the services that she needs in the home. Message sent to the provider since she would like to complete her Annual Wellness Visit.    CHW Next Follow-up: One month    CHW Plan: Follow up in one month regarding the goals    Next Outreach: 6/11/20

## 2021-06-08 NOTE — PROGRESS NOTES
Clinic Care Coordination Contact    Follow Up Progress Note      CC SW called pt to update goals below.  Pt made good progress on all goals and was very organized during the call.  She called [facility] and created an account.  They are not accepting new patients until July. She will call in July to schedule with them.    Pt called [employer] for assistance with installing hand rails and they are not open until July.  She will call back then.     Pt called [employer] and was told meals would be $5.  Her roommate is starting to cook more so she no longer wants this option.    She called [employer] and will use resource in a pinch in the future if needed, due to the cost.     Pt spoke to Dr. Acosta's RN about her interest in seeing Dr. Acosta for alternatives to managing high blood pressure and learning more about mind/body medicine.  She will call back to determine if phone visit is an option.     Goals addressed this encounter:   Goals Addressed                 This Visit's Progress       Patient Stated      Financial Wellbeing (pt-stated)   On track     Goal Statement- I would like help in navigating my dental coverage so I can get my teeth pulled.    Action steps.   1. I will schedule with [facility] when I am able to do so.  I created an account with them.      3. I will update the CHW or SW when I am able to work on this goal    (Hold due to COVID-19, 5/7/20)    Date goal set: 10/3/19    Updated: 5/7/20; 5/14/20; 6/11/20        Medical (pt-stated)   On track     Goal Statement: I will contact [employer] [facility] for information about the Functional Medicine program with Dr. Acosta    Date Goal set: 5/1/20  Barriers: none identified  Strengths: interested in program   Date to Achieve By: July 1  Patient expressed understanding of goal: yes    Action steps to achieve this goal:  1. I will call to learn more about the program and schedule if I chose to  participate done  2.  I will schedule phone visit with Dr. Acosta if I am able  3. I will update CC team on next outreach call    Updated 6/11/20        Reducing Risks (pt-stated)   On track     Goal Statement: I would like hand rails on my front steps    Date Goal set: 5/13/20  Barriers: permission from association and financial   Strengths: motivated   Date to Achieve By: October 1  Patient expressed understanding of goal: yes    Action steps to achieve this goal:  1. I will contact [employer] and [employer] to see if I qualify for assistance (closed until July)  2. I will speak to my [employer] association for permission needed  3. I will update CC team on outreach calls    Updated: 6/11/20                Outreach Frequency: monthly    Plan:   CHW see updated goals and follow up with pt in one month

## 2021-06-08 NOTE — TELEPHONE ENCOUNTER
I called Vanda to get more information on what exactly Imelda wanted her to see Dr. Acosta for.  Vanda stated that she didn't know exactly but that she has had hypertension for a very long time and still struggles to keep her blood pressures down even with trying all sorts of different medications.  She stated that maybe it would benefit her to try a different approach and that her  thought that maybe she should see Dr. Acosta.  Pt stated that she has problems with not being able to afford a lot so she is also concerned that the functional medicine wouldn't be covered by her insurance.

## 2021-06-09 NOTE — PROGRESS NOTES
Assessment/Plan:        1. Accelerated hypertension     2. Osteoporosis Senile         Return in about 3 months (around 6/15/2017) for Recheck.    Patient Instructions   See you in June for Prolia and BP check.      Chief Complaint   Patient presents with     Hypertension     Patient reports BP at home 130-160/80. She is taking Benicar and hydralazine 10 mg tid. She is tolerating these meds well.  We will increase hydralazine to 25 mg tid. She will call me in 1 month to report her BP numbers.    Visit Vitals     /82     Pulse 82     Wt 209 lb 11.2 oz (95.1 kg)     BMI 32.84 kg/m2     15 minutes spent with the patient and more then 50 % of the time in counseling.  This note has been dictated using voice recognition software. Any grammatical or context distortions are unintentional and inherent to the software      Patient Active Problem List   Diagnosis     Adenoma Of The Parathyroid Gland     Osteoporosis Senile     S/P parathyroidectomy     Lower extremity weakness     Low back pain     Accelerated hypertension     Compression fracture of L3 lumbar vertebra       Current Outpatient Prescriptions on File Prior to Visit   Medication Sig Dispense Refill     ergocalciferol (ERGOCALCIFEROL) 50,000 unit capsule Take 1 capsule (50,000 Units total) by mouth once a week. 20 capsule 0     olmesartan (BENICAR) 40 MG tablet Take 1 tablet (40 mg total) by mouth daily. 90 tablet 3     [DISCONTINUED] hydrALAZINE (APRESOLINE) 10 MG tablet Take 1 tablet (10 mg total) by mouth 3 (three) times a day. 90 tablet 3     [DISCONTINUED] hydrALAZINE (APRESOLINE) 10 MG tablet Take 10 mg by mouth 3 (three) times a day. Indications: Hypertension       [DISCONTINUED] oxyCODONE-acetaminophen (PERCOCET) 5-325 mg per tablet Take 1 tablet by mouth every 4 (four) hours as needed for pain.       Current Facility-Administered Medications on File Prior to Visit   Medication Dose Route Frequency Provider Last Rate Last Dose     [DISCONTINUED]  pneumococcal conj. 13-valent vaccine 0.5 mL (PREVNAR-13)  0.5 mL Intramuscular Once Adonis Saunders MD

## 2021-06-09 NOTE — PROGRESS NOTES
Clinic Care Coordination Contact    Community Health Worker Follow Up    Goals:   Goals Addressed                 This Visit's Progress       General      Financial Wellbeing (pt-stated)   10%     Goal Statement- I would like help in navigating my dental coverage so I can get my teeth pulled.    Action steps.   1. I will schedule with [facility] when I am able.  I'm trying to call them today.      2. I will update the CHW or SW when I am able to work on this goal    (Hold due to COVID-19, 5/7/20)      Date goal set: 10/3/19    Updated: 7/13/2020        Medical (pt-stated)   10%     Goal Statement: I will contact [facility] for information about the Functional Medicine program with Dr. Acosta    Date Goal set: 5/1/20  Barriers: none identified  Strengths: interested in program   Date to Achieve By: July 1  Patient expressed understanding of goal: yes    Action steps to achieve this goal:  1. I will call to learn more about the program and schedule if I chose to participate done  2.  I will schedule phone visit with Dr. Acosta if I am able  3. I will update Community Health Worker or Dolores at next outreach call    Note: I left them a message, but haven't heard back    Updated 7/13/2020        Reducing Risks (pt-stated)   20%     Goal Statement: I would like hand rails on my front steps    Date Goal set: 5/13/20  Barriers: permission from association and financial   Strengths: motivated   Date to Achieve By: October 1  Patient expressed understanding of goal: yes    Action steps to achieve this goal:  1. I will contact [employer] to see if I qualify for assistance (Complete)  2. I will speak to my homeowners association for permission needed- Not discussed  3. I will update CC team on outreach calls  4. I will complete my 3 page application and provide proof of ownership to [employer] so they can assist in the fall.     Updated:  713/2020            Intervention and education during outreach:  Spoke with Vanda today. She said, she is in good shape and doing good. She was grateful for the resource on [employer]. They provided her an application to complete but won't be able to assist until Fall.  They requested for the person who referred Vanda to them. I provided her Dolores's name with Cara's phone number.  Vanda said, she was trying to call [facility] today to see if she can get an appointment scheduled. So far she hasn't gotten to speak with someone. She asked about an antibiotic for dental work. We talked about discussing this further with the dentist so that if she needs an antibiotic they can order that.  She stated she called Dr. Acosta's office and left a message but hasn't gotten a call back. She says, she understands that things maybe busy.        CHW Plan:  CHW will follow up in 1 month

## 2021-06-10 ENCOUNTER — COMMUNICATION - HEALTHEAST (OUTPATIENT)
Dept: NURSING | Facility: CLINIC | Age: 86
End: 2021-06-10

## 2021-06-10 NOTE — PROGRESS NOTES
Clinic Care Coordination Contact     Situation: Patient chart reviewed by care coordinator.     Background: Pts initial assessment and enrollment to Care Coordination was 10/3/19.   Patient centered goals were developed with participation from patient.  SW CC handed patient off to CHW for continued outreach every 30 days.      Assessment: CHW has been in contact with patient monthly. Patient has made progress to goals.       Plan/Recommendations: CHW will involve SW CC as needed or if patient is ready to move to maintenance.    CHW at next outreach please recommend to pt that she discuss functional medicine program with PCP

## 2021-06-10 NOTE — PROGRESS NOTES
Clinic Care Coordination Contact    Community Health Worker Follow Up    Goals:   Goals Addressed                 This Visit's Progress       General      Financial Wellbeing (pt-stated)   40%     Goal Statement- I would like help in navigating my dental coverage so I can get my teeth pulled.    Action steps.   1. I will schedule with [facility] when I am able.  I'm trying to call them today. (Not taking new patients)     2. I will update the CHW or SW when I am able to work on this goal    3. I will complete my virtual visit once scheduled to see if [facility] can assist me with my teeth.     (Hold due to COVID-19, 5/7/20)      Date goal set: 10/3/19    Updated: 8/13/2020        Medical (pt-stated)   30%     Goal Statement: I will contact [facility] for information about the Functional Medicine program with Dr. Acosta    Date Goal set: 5/1/20  Barriers: none identified  Strengths: interested in program   Date to Achieve By: July 1  Patient expressed understanding of goal: yes    Action steps to achieve this goal:  1. I will call to learn more about the program and schedule if I chose to participate done  2.  I will schedule phone visit with Dr. Acosta if I am able  3. I will update Community Health Worker or Dolores at next outreach call    Note: On hold due to COVID as she needs a in person visit.    Updated 8/13/2020        Reducing Risks (pt-stated)   30%     Goal Statement: I would like hand rails on my front steps    Date Goal set: 5/13/20  Barriers: permission from association and financial   Strengths: motivated   Date to Achieve By: October 1  Patient expressed understanding of goal: yes    Action steps to achieve this goal:  1. I will contact [employer] to see if I qualify for assistance (Complete)  2. I will speak to my homeowners association for permission needed- Not discussed  3. I will update CC team on outreach calls  4. I  will complete my 3 page application and provide proof of ownership to [employer] so they can assist in the fall.   5. I will reach out to Tad's worker to see if this can be covered.     Updated 8/13/2020            Intervention and education during outreach:  Spoke with Vanda today. She said, things have been going okay.  She has been trying to navigate care for Tad as he needs more in home services. She stated that she has a call out to his county worker. She did call senior linkage line and they directed her to the worker for an assessment.  Since Tad needs the ramp more than her she is going to see if this could be covered. She feels she is doing okay navigating through this. Since Tad goes to [facility], I let her know they have a similar program there that they can request to speak with a SW to assist him through the process.      She has put seeing Dr. Acosta on hold. She said, when she called they asked if it would be more hands on care. She told them yes as its for her neck.  So she decided to hold until COVID is slowed down and she can be seen in person.     Vanda did reach out to [facility] and [facility] and at this time, they aren't seeing new patients.  She did learn of [facility] and have been working with them. She had to complete an application. Vanda stated she than has to do a virtual appointment to see if they can help with her teeth.    Vanda shared that Tad is currently receive an additional $600 for SNAP benefits which as been helping.  They are still able to get to food shelves without an concern. She said, that she has been trying to help Tad with the cooking as its been harder for him. He will talk her through it while she is cooking. She said, sometimes the food turns out good as others it doesn't.     CHW Plan:  CHW will follow up in 1 month

## 2021-06-11 ENCOUNTER — COMMUNICATION - HEALTHEAST (OUTPATIENT)
Dept: CARE COORDINATION | Facility: CLINIC | Age: 86
End: 2021-06-11

## 2021-06-11 NOTE — PROGRESS NOTES
Clinic Care Coordination Contact    Situation: Patient chart reviewed by care coordinator. Goals updated, see below.      Background: Pts initial assessment and enrollment to Care Coordination was 10/3/19.   Patient centered goals were developed with participation from patient.  SW CC handed patient off to CHW for continued outreach every 30 days.      Assessment: CHW has been in contact with patient monthly. Patient has made progress to goals.       Plan/Recommendations: CHW will involve SW CC as needed or if patient is ready to move to maintenance.         Goals        Patient Stated      Financial Wellbeing (pt-stated)      Goal Statement- I would like help in navigating my dental coverage so I can get my teeth pulled.    Action steps.   1. I will schedule with [facility] when I am able.  I'm trying to call them today. (Not taking new patients)     2. I will update the CHW or SW when I am able to work on this goal    3. I will complete my virtual visit once scheduled to see if [facility] can assist me with my teeth.     (Hold due to COVID-19, 5/7/20)      Date goal set: 10/3/19    Updated: 8/13/2020        Reducing Risks (pt-stated)      Goal Statement: I would like hand rails on my front steps    Date Goal set: 5/13/20  Barriers: permission from association and financial   Strengths: motivated   Date to Achieve By: October 1  Patient expressed understanding of goal: yes    Action steps to achieve this goal:  1. I will contact [employer] to see if I qualify for assistance (Complete)  2. I will speak to my homeowners association for permission needed- Not discussed  3. I will update CC team on outreach calls  4. I will complete my 3 page application and provide proof of ownership to [employer] so they can assist in the fall.   5. I will reach out to Tad's worker to see if this can be covered.     Updated 8/13/2020

## 2021-06-11 NOTE — PROGRESS NOTES
CHW reviewed CCC SW chart note from 9/15/2020    Next CHW outreach due: 10/15     Note from 9/15-Pts initial assessment and enrollment to Care Coordination was 10/3/19.   Patient centered goals were developed with participation from patient.  SW CC handed patient off to CHW for continued outreach every 30 days.     Plan:CHW will involve SW CC as needed or if patient is ready to move to maintenance.

## 2021-06-12 NOTE — PROGRESS NOTES
Clinic Care Coordination Contact  [facility]/Voicemail       Clinical Data: Care Coordinator Outreach  Outreach attempted x 1.  Left message on patient's voicemail with call back information and requested return call.  Plan:Care Coordinator will try to reach patient again in 10 business days.  Ernestine Cruz, [facility]  Clinic Care Coordinator  [phone]    Clinic Care Coordination Contact    Follow Up Progress Note      Assessment: Call from patient.  Her long time   in hospice 10 days ago.  His daughter, who is disabled is living with her, and her daughter and grand children will be moving in with patient in next month. This will help with financial stresses, but feels the place will be too crowded.  She did get to have one tooth pulled at the [facility] dental clinic, but was referred to an oral surgeon to have more teeth pulled. They are broken off and cause significant pain. She was quoted $400 for anesthetic and she cannot afford that at this time.  Uses pain medication to control pain. Having a hard time thinking at this point.  Wants to have follow up in one month as they are busy taking care of needs following his death.     Goals addressed this encounter:   Goals Addressed                 This Visit's Progress       Patient Stated      Financial Wellbeing (pt-stated)        Goal Statement- I would like help in navigating my dental coverage so I can get my teeth pulled.    Action steps.   1. I will schedule with [facility] when I am able.  I'm trying to call them today. (Not taking new patients)     2. I will update the CHW or SW when I am able to work on this goal    3. I will complete my virtual visit once scheduled to see if [facility] can assist me with my teeth.     (Hold due to COVID-19, 20)      Date goal set: 10/3/19    Updated: 2020  Discussed 10-             Intervention/Education provided during outreach: Discussed free meals on wheels and  she declined. They have enough food.       Outreach Frequency: monthly    Plan:   Delegating to CHW to contact in less than one month.    Care Coordinator will follow up in 45 days and as needed.  Ernestine Cruz MORIAH  Clinic Care Coordinator  [phone]

## 2021-06-12 NOTE — PROGRESS NOTES
CHW reviewed [facility] SW chart note from 10/9/2020   Current CHW delegation, Plan:   Delegating to CHW to contact in less than one month.     Next CHW outreach due: 11/5/2020

## 2021-06-13 NOTE — PROGRESS NOTES
CHW completed chart review today. Reviewed chart note from OFE DURHAM on 11/24/2020.  Goal were discussed and updated at this outreach.  Next outreach: 12/18/2020    Goals        Patient Stated      Financial Wellbeing (pt-stated)      Goal Statement: I will learn if I qualify for Medicare Savings programs and see if I want to sign up for a different Medicare insurance for 2021 within three months.   Date Goal set: 11-  Barriers: COVID.    Strengths: Can call [employer], has used them before.    Date to Achieve By: 2-  Patient expressed understanding of goal: yes  Action steps to achieve this goal:  1. I will call Senior Link[employer] line to review my options for 2021.  2. I will complete any paperwork or intake to sign up for insurance.  3. I will talk to care coordination team monthly and will contact with any problems or concerns as needed.           Other (pt-stated)      Goal Statement: I will see if I am eligible for energy assistance within three months.   Date Goal set: 11-  Barriers: My household size has increased over the past two months. Has not applied for it before.  Strengths: I can go online and complete application or print it off.   Date to Achieve By: 2-  Patient expressed understanding of goal: yes  Action steps to achieve this goal:  1. I will go to CAP agency for [facility] to review application guidelines.   2. I will complete application if I am eligible.   3.  I will talk to care coordination team monthly and will contact with any problems or concerns as needed.           Reducing Risks (pt-stated)      Goal Statement: I would like hand rails on my front steps    Date Goal set: 5/13/20  Barriers: permission from association and financial   Strengths: motivated   Date to Achieve By: October 1  Patient expressed understanding of goal: yes    Action steps to achieve this goal:  1. I will contact [employer] to see if I qualify for  assistance (Complete)  2. I will speak to my homeowners association for permission needed- Not discussed  3. I will update CC team on outreach calls  4. I will complete my 3 page application and provide proof of ownership to [employer] so they can assist in the fall.   5. I will reach out to Tad's worker to see if this can be covered.     Updated 8/13/2020 11- on hold.

## 2021-06-13 NOTE — PROGRESS NOTES
Clinic Care Coordination Contact  [facility]/Voicemail       Clinical Data: Care Coordinator Outreach  Outreach attempted x 1.  Left message on patient's voicemail with call back information and requested return call.  Plan: Care Coordinator discuss and update goal progression   Care Coordinator will try to reach patient again in 10 business days.  11/27/2020

## 2021-06-13 NOTE — PROGRESS NOTES
Clinic Care Coordination Contact    Follow Up Progress Note      Assessment: Called patient and set up new goals and completed dental goal.  She had gotten application from [employer] and was trying to complete it several months ago.  Then they tried to get ramp through [employer] waiver service instead, then he .  At this time, her daughter Alisson is assisting her getting up and down the steps.  Is comfortable putting that goal on hold while she works on other things.      She did talk to Kareem last night and he will call [employer] to learn if he can apply for Social Security disability.      They have enough food and she does not want to get meals on wheels at this time.  Reviewed energy assistance and set up goal.  Reviewed Medicare savings plans and that she may be eligible and she wants to pursue. Goal set and she will call the Senior Linkage Line.    Reviewed COVID housing assistance and they are not behind in payments at this time and she wants to keep it that way.  Gave her phone number for [employer] if she wants to call about other supports.  Encouraged her to sign up for senior subsidized housing as there are waiting lists. If she sells her condo next year, she will need an apartment.  When she calls the Senior Linkage Line about insurance, she will ask them to create a list of senior apartments that she can apply to and get on their waiting list.  She has sisters who live in [place] and have asked her to move there. She is unsure that would be the best decision.      Goals addressed this encounter:   Goals Addressed                 This Visit's Progress       Patient Stated      COMPLETED: Financial Wellbeing (pt-stated)        Goal Statement- I would like help in navigating my dental coverage so I can get my teeth pulled.    Action steps.   1. I will schedule with [facility] when I am able.  I'm trying to call them today. (Not taking new patients)     2. I will update the  CHW or SW when I am able to work on this goal    3. I will complete my virtual visit once scheduled to see if [facility] can assist me with my teeth.     (Hold due to COVID-19, 5/7/20)      Date goal set: 10/3/19    Updated: 8/13/2020  Discussed 10-  Discussed and completed 11-25        Financial Wellbeing (pt-stated)        Goal Statement: I will learn if I qualify for Medicare Savings programs and see if I want to sign up for a different Medicare insurance for 2021 within three months.   Date Goal set: 11-  Barriers: COVID.    Strengths: Can call [employer], has used them before.    Date to Achieve By: 2-  Patient expressed understanding of goal: yes  Action steps to achieve this goal:  1. I will call Senior Linkage line to review my options for 2021.  2. I will complete any paperwork or intake to sign up for insurance.  3. I will talk to care coordination team monthly and will contact with any problems or concerns as needed.           Other (pt-stated)        Goal Statement: I will see if I am eligible for energy assistance within three months.   Date Goal set: 11-  Barriers: My household size has increased over the past two months. Has not applied for it before.  Strengths: I can go online and complete application or print it off.   Date to Achieve By: 2-  Patient expressed understanding of goal: yes  Action steps to achieve this goal:  1. I will go to CAP agency for [facility] to review application guidelines.   2. I will complete application if I am eligible.   3.  I will talk to care coordination team monthly and will contact with any problems or concerns as needed.           Reducing Risks (pt-stated)   On Hold     Goal Statement: I would like hand rails on my front steps    Date Goal set: 5/13/20  Barriers: permission from association and financial   Strengths: motivated   Date to Achieve By: October 1  Patient expressed understanding of  goal: yes    Action steps to achieve this goal:  1. I will contact [employer] to see if I qualify for assistance (Complete)  2. I will speak to my homeowners association for permission needed- Not discussed  3. I will update CC team on outreach calls  4. I will complete my 3 page application and provide proof of ownership to [employer] so they can assist in the fall.   5. I will reach out to Tad's worker to see if this can be covered.     Updated 2020 on hold.             Intervention/Education provided during outreach: Reviewed resources and how to proceed.      Outreach Frequency: monthly    Plan:   Delegating to CHW to contact in less than 30 days.    Care Coordinator will follow up in 45 days and as needed.  Ernestine Cruz, [facility]  Clinic Care Coordinator  [phone]    Clinic Care Coordination Contact    Follow Up Progress Note      Assessment: call back from patient.  They have cremated her partner Tad and have his ashes which they will buy next spring.  His daughter Alisson is living with her and her step son Kareem who has lived with her for a while. He is unable to work and only source of income is SNAP of $600.  Discussed that he needs to apply for disability and gave referral to [employer].  He does not have health insurance due to his choice.  Her daughter Jada is also living with them. She had been working at a restaurant, but due to COVID is out of work.     They live in a 3 bedroom condo and someone sleeps on the couch which is ok.  When Tad , patient lost $800 of income and is it is difficult to for her to cover her bills. She has figured that she is ok through 2020 as she has a tax refund and will be getting reimbursed for $500 for dental work.  She has cut costs for cable and the newspaper. They have used the food shelf twice.     She was able to get her six teeth pulled by oral surgeon and is so grateful to  have this done.  Completed goal.     They are planning on selling the condo in 2021 as they cannot afford it.  Kareem needs some time to finish basement tasks.  She thinks it will sell quickly as it is the right price point. The association fees are the most difficult to afford- $460. The mortgage is $185 per month.  Her income includes Social Security of $1,061 and pension of $379= 1440.     Goals addressed this encounter:   Goals Addressed                 This Visit's Progress       Patient Stated      COMPLETED: Financial Wellbeing (pt-stated)        Goal Statement- I would like help in navigating my dental coverage so I can get my teeth pulled.    Action steps.   1. I will schedule with [facility] when I am able.  I'm trying to call them today. (Not taking new patients)     2. I will update the CHW or SW when I am able to work on this goal    3. I will complete my virtual visit once scheduled to see if [facility] can assist me with my teeth.     (Hold due to COVID-19, 5/7/20)      Date goal set: 10/3/19    Updated: 8/13/2020  Discussed 10-  Discussed and completed 11-25             Intervention/Education provided during outreach: gave referral to [employer] for Kareem to call to start the process.      Outreach Frequency: monthly    Plan:   Will call tomorrow to discuss resources and to set up goal.  Care Coordinator will follow up in one day.        Clinic Care Coordination Contact  [facility]/Voicemail       Clinical Data: Care Coordinator Outreach  Outreach attempted x 2.  Left message on patient's voicemail with call back information and requested return call. CHW left VM 10 days ago.   Plan: Care Coordinator will try to reach patient again in 10 business days by sending unable to reach letter.   Ernestine Cruz, [facility]  Clinic Care Coordinator  [phone]

## 2021-06-13 NOTE — PROGRESS NOTES
Clinic Care Coordination Contact    Community Health Worker Follow Up    Goals:   Goals Addressed                 This Visit's Progress       Patient Stated      Financial Wellbeing (pt-stated)   20%     Goal Statement: I will learn if I qualify for Medicare Savings programs and see if I want to sign up for a different Medicare insurance for 2021 within three months.   Date Goal set: 11-  Barriers: COVID.    Strengths: Can call [employer], has used them before.    Date to Achieve By: 2-  Patient expressed understanding of goal: yes  Action steps to achieve this goal:  1. I will call Senior Link[employer] line to review my options for 2021.completed  2. I will complete any paperwork or intake to sign up for insurance.  3. I will talk to care coordination team monthly and will contact with any problems or concerns as needed.   4. I will schedule a phone visit with OFE DURHAM after the first of the year, in January 2021 to assist me with filling out paperwork regarding income.    12/16/2020 Discussed and updated         Other (pt-stated)   20%     Goal Statement: I will see if I am eligible for energy assistance within three months.   Date Goal set: 11-  Barriers: My household size has increased over the past two months. Has not applied for it before.  Strengths: I can go online and complete application or print it off.   Date to Achieve By: 2-  Patient expressed understanding of goal: yes  Action steps to achieve this goal:  1. I will go to CAP agency for [facility] to review application guidelines.   2. I will complete application if I am eligible.   3.  I will talk to care coordination team monthly and will contact with any problems or concerns as needed.   4. I will schedule a follow up phone visit with OFE DURHAM January 2021, to determine what income is regarding granddaughter living with me.    Discussed and updated: 12/16/2020          Reducing Risks (pt-stated)   On Hold     Goal  Statement: I would like hand rails on my front steps    Date Goal set: 5/13/20  Barriers: permission from association and financial   Strengths: motivated   Date to Achieve By: October 1  Patient expressed understanding of goal: yes    Action steps to achieve this goal:  1. I will contact [employer] to see if I qualify for assistance (Complete)  2. I will speak to my homeowners association for permission needed- Not discussed  3. I will update CC team on outreach calls  4. I will complete my 3 page application and provide proof of ownership to [employer] so they can assist in the fall.   5. I will reach out to Tad's worker to see if this can be covered.     Updated 8/13/2020 11- on hold.  12/16/2020 on hold            CHW Next Follow-up: 1/11/2021    CHW Plan: To continue with monthly outreach call,   On next outreach in January patient has agreed to schedule follow up SW phone visit to assist her with filing paperwork for EA and with senior linkage- patient has been told she is $900 over the income with [employer], as well as not being sure how to report her income with her granddaughter who is working part time and living under her roof.

## 2021-06-13 NOTE — PROGRESS NOTES
Assessment/Plan:        1. Accelerated hypertension         Return in about 6 months (around 3/22/2018) for Recheck.    There are no Patient Instructions on file for this visit.    Chief Complaint   Patient presents with     Hypertension     flu shot     Patient is here today for the follow-up of hypertension.  When I saw her last time for the preop, for some reason she did not take any of her blood pressure medications for 2-3 weeks.  Now she is back on Benicar 40 mg daily and she is taking hydralazine 25 mg only 1 pill in the morning and does not take other 2 doses in a day.  She thinks that is making her dizzy.  Her blood pressure at home very eights between 125 and 155 systolic.  He is here for to say and what time of the day her blood pressure is better and worse.   He agreed to add another hydralazine dose at night and if the blood pressures still above 150 systolic, to increase hydralazine dose to 50 mg in the morning and possibly 50 mg at night.  She is refusing to take any dose during the day.  She has appointment with me in a few months for Prolia injection and we will recheck her blood pressure at that time.  Had normal blood work in August.    /70  Pulse 70  Wt 216 lb 4.8 oz (98.1 kg)  BMI 33.88 kg/m2  15 minutes spent with the patient and more then 50 % of the time in counseling.  This note has been dictated using voice recognition software. Any grammatical or context distortions are unintentional and inherent to the software      Patient Active Problem List   Diagnosis     Adenoma Of The Parathyroid Gland     Osteoporosis Senile     S/P parathyroidectomy     Lower extremity weakness     Low back pain     Accelerated hypertension     Compression fracture of L3 lumbar vertebra       Current Outpatient Prescriptions on File Prior to Visit   Medication Sig Dispense Refill     cholecalciferol, vitamin D3, (VITAMIN D3) 2,000 unit Tab Take by mouth.       hydrALAZINE (APRESOLINE) 25 MG tablet Take 1  tablet (25 mg total) by mouth 3 (three) times a day. 270 tablet 3     olmesartan (BENICAR) 40 MG tablet Take 1 tablet (40 mg total) by mouth daily. 90 tablet 3     No current facility-administered medications on file prior to visit.

## 2021-06-14 NOTE — PROGRESS NOTES
Clinic Care Coordination Contact    Community Health Worker Follow Up    Goals:   Goals Addressed                 This Visit's Progress       Patient Stated      Financial Wellbeing (pt-stated)        Goal Statement: I will learn if I qualify for Medicare Savings programs and see if I want to sign up for a different Medicare insurance for 2021 within three months.   Date Goal set: 11-  Barriers: COVID.    Strengths: Can call [employer], has used them before.    Date to Achieve By: 2-  Patient expressed understanding of goal: yes  Action steps to achieve this goal:  1. I will call Senior Link[employer] line to review my options for 2021.completed  2. I will complete any paperwork or intake to sign up for insurance. ( Patient stated over the income limit for RX help?)  3. I will talk to care coordination team monthly and will contact with any problems or concerns as needed. Continuous (MB)  4. I will schedule a phone visit with OFE DURHAM after the first of the year, in January 2021 to assist me with filling out paperwork regarding income. Appointment with MARVA scheduled for 1/13/21.    Goal Updated: 1/12/21        Other (pt-stated)        Goal Statement: I will see if I am eligible for energy assistance within three months.   Date Goal set: 11-  Barriers: My household size has increased over the past two months. Has not applied for it before.  Strengths: I can go online and complete application or print it off.   Date to Achieve By: 2-  Patient expressed understanding of goal: yes  Action steps to achieve this goal:  1. I will go to CAP agency for [facility] to review application guidelines.   2. I will complete application if I am eligible.   3.  I will talk to care coordination team monthly and will contact with any problems or concerns as needed.   4. I will schedule a follow up phone visit with OFE DURHAM January 2021, to determine what income is regarding granddaughter living with  me.    1/12/21 Did not discuss  Discussed and updated: 12/16/2020          Reducing Risks (pt-stated)        Goal Statement: I would like hand rails on my front steps    Date Goal set: 5/13/20  Barriers: permission from association and financial   Strengths: motivated   Date to Achieve By: October 1  Patient expressed understanding of goal: yes    Action steps to achieve this goal:  1. I will contact [employer] to see if I qualify for assistance (Complete)  2. I will speak to my homeowners association for permission needed- Not discussed  3. I will update CC team on outreach calls  4. I will complete my 3 page application and provide proof of ownership to [employer] so they can assist in the fall.   5. I will reach out to [employer] worker to see if this can be covered.     1/12/21 Did not discuss    Goal Updated: 1/13/21            CHW Next Follow-up: 2/10/21    CHW Plan: Patient is scheduled to talk with CCC MARVA Bernal on 1/13/21. Patients  passed away fall on 2020. Because of this patient is having trouble financially. Patient is considering moving to [place] but is overwhelmed with the process of selling her home and packing. Patients hearing has also gotten worse and depending if she decides to move or not she may want resources for this in [place].

## 2021-06-15 NOTE — PROGRESS NOTES
"Clinic Care Coordination Contact    Follow Up Progress Note      Assessment: CC MARVA called and spoke with pt to update goals and provide resources.  Pt has not had movement on goals since we last spoke.  She had to put cat asleep yesterday and is grieving that.  Pts \"step kids\" still staying with her (Kareem and Alisson).    Pt says she has Certain Populations application from the [facility] but she has not completed.  She has other applications for senior independent housing in [facility] (?)    Discussed pts goal of downsizing and selling her home.  Discussed that if pt sells her home she would likely not qualify for low income/subsidized housing as money from home sale would count as assets.    CC MARVA advised pt to look at assisted living facilities that offer senior independent apartments.  Discussed that pt would likely be able to private pay for several years.  Educated pt that assisted living facility would help her apply for [facility] assistance/programs when her funds ran out.     Goals addressed this encounter:   Goals Addressed                 This Visit's Progress       Patient Stated      Medical (pt-stated)   50%     Goal Statement: I would like to look into hearing aides  Date Goal set: 1/13/21  Barriers: share of cost  Strengths: motivated  Date to Achieve By: 6 months  Patient expressed understanding of goal: yes    Action steps to achieve this goal:  1. I will schedule initial visit with Audiology when then contact me (scheduled 3/3/21)  2. I will follow through with recommendations from audiology           Psychosocial (pt-stated)   0%     Goal Statement: I would like to move into senior independent living at an assisted living complex    Date Goal set: 1/13/21; 2/17/21 updated   Barriers: needs to sell home, downsize personal belongings   Strengths: family is helping  Date to Achieve By: 6 months  Patient expressed understanding of goal: yes    Action steps to achieve this goal:  1. I will " "search on [facility] and call assisted living facilities for more information  2.  I will complete paperwork at the place that I chose to live   2. I will look into what is needed to sell my home.  I will contact companies that sell homes \"as is\" to learn more.  I will ask the assisted living facility if they have resources for this.             Psychosocial 2 (pt-stated)   0%     Goal Statement: I will downsize to prepare for moving  Date Goal set: 21  Barriers:  partner has a lot of belongings  Strengths: partner's family is helping  Date to Achieve By: 6 months  Patient expressed understanding of goal: yes    Action steps to achieve this goal:  1. I will contact [employer] or similar service to assist with downsizing as my sister will help with cost  2. I will contact [employer] to see if they are interested in my  partners photography and tool collections  3. I will ask for additional resources for this if needed              Intervention/Education provided during outreach:     [employer]  [phone]  [email or web address]    [facility]  Www.careoptionsnetwork.org    [facility]  [address] [place] [address]  Admissions: [phone]    [facility]  [address] [place] [address]  Admissions: [phone]    [facility]  [address] [place] [address]  Admissions: [phone]       Outreach Frequency: monthly    Plan:   CC SW outreach in one month, CHW set outreach for 2 months    "

## 2021-06-16 NOTE — PROGRESS NOTES
Clinic Care Coordination Contact    Follow Up Progress Note      Assessment: OFE DURHAM spoke with pt to update goals.  Pt has decided to stay in her home.  She spoke to her sister about moving to [place] (where pt was born and her sister lives) but she did not like the limited senior housing options there.     Pt shares that she is allergic to a chemical in natural gas and if she were to move would need a place with hot water heat and electric stove.  Discussed this would significantly limit assisted living options as hot water heat is not common.     Pt plans to meet with an  in [place] and discus filling for bankruptcy.  Pt says this will allow her to remain in her home.  Pt says she owes about $20K on her mortgage but has significant credit card debt.    Goals addressed this encounter:   Goals Addressed                 This Visit's Progress       Patient Stated      Medical (pt-stated)        Goal Statement: I would like to look into hearing aides  Date Goal set: 1/13/21; Updated 3/19/21  Barriers: share of cost  Strengths: motivated  Date to Achieve By: 6 months  Patient expressed understanding of goal: yes    Action steps to achieve this goal:  1. I will schedule initial visit with Audiology when then contact me (scheduled 4/7/21)  2. I will follow through with recommendations from audiology           Psychosocial (pt-stated)        Goal Statement: I will meet with  to discuss financial situations that will allow me to remain in my home    Date Goal set: 3/19/21  Barriers: Debts  Strengths: Has legal resource  Date to Achieve By: 3 months  Patient expressed understanding of goal: yes    Action steps to achieve this goal:  1. I may decide to file for bankruptcy so I am able to make my house payments  2. I will ask for legal resources if needed               Outreach Frequency: monthly    Plan:   CHW keep outreach date for April and outreach monthly.  OFE DURHAM to monitor chart every 45 days and  available as needed.

## 2021-06-16 NOTE — PROGRESS NOTES
1. Accelerated hypertension     2. Osteoporosis Senile         Return in about 6 months (around 8/15/2018) for Recheck.    Patient Instructions   Prolia 3rd today.  Prolia 4th in 6 months with my nurse. I will see you in 1 year.  DXA in 8/2019 .   Phone number to schedule [phone].  Please avoid any extensive dental work as implants and teeth extractions for the next 1-2 months.  Daily calcium need is 1303-1063 mg a day from the diet and supplements.  Calcium citrate is easier to digest.  Vitamin D 2000 IU daily recommended.      Chief Complaint   Patient presents with     Osteoporosis Follow Up     Fall     pt was [facility] at [facility] fall       /78  Pulse 68  Wt 217 lb 4.8 oz (98.6 kg)  BMI 34.03 kg/m2      Did you experience any problems with previous Prolia injection? no  Any medication change in the last 6 months? no  Did you take prednisone or other immunosupressant drugs in the last 6 months   (chemo, transplant, rheum, dermatology conditions)? no  Did you have any serious infection in the last 6 months?no  Any recent hospitalizations?no  Do you plan any dental work in the next 2-3 months?no  How much calcium do you take daily from the diet and supplements?1200 mg  How much vit D do you take daily? 2000 IU  Last DXA?8/2017      Patient is here today for the 3rd Prolia injection. Patient tolerated previous injections well.   We discussed calcium and vit D daily needs today.   Next Prolia injection will be in 6 months.   Patient is also here today for the follow-up of hypertension.  When I saw her last time she was taking  Benicar 40 mg daily and  hydralazine 25 mg only 1 pill in the morning She thinks that is making her dizzy. She stopped it after the ER visit in November when she was dizzy. She brought that records. CBC and CMP were normal. She took meclizine for few days. Since then, taking only Benicar.  Her blood pressure at home is between 125 and 140 systolic.    We discussed importance of  maintaining good BP and stroke prevention.  She will continue monitoring at home and add hydralazine back if her systolic blood pressure is more than 140.    25 minutes spent with the patient and more then 50 % of the time in counseling.  This note has been dictated using voice recognition software. Any grammatical or context distortions are unintentional and inherent to the software      Patient Active Problem List   Diagnosis     Adenoma Of The Parathyroid Gland     Osteoporosis Senile     S/P parathyroidectomy     Lower extremity weakness     Low back pain     Accelerated hypertension     Compression fracture of L3 lumbar vertebra       Current Outpatient Prescriptions on File Prior to Visit   Medication Sig Dispense Refill     cholecalciferol, vitamin D3, (VITAMIN D3) 2,000 unit Tab Take by mouth.       olmesartan (BENICAR) 40 MG tablet Take 1 tablet (40 mg total) by mouth daily. 90 tablet 3     hydrALAZINE (APRESOLINE) 25 MG tablet Take 1 tablet (25 mg total) by mouth 3 (three) times a day. 270 tablet 3     No current facility-administered medications on file prior to visit.

## 2021-06-16 NOTE — PROGRESS NOTES
Clinic Care Coordination Contact  [facility]/Voicemail    Clinical Data: Care Coordinator Outreach  Outreach attempted x 1.  Left message on patient's voicemail with call back information and requested return call.  Plan: Care Coordinator will try to reach patient again in 10 business days.    Next CHW outreach date: 4/29/21    Plan:   CHW keep outreach date for April and outreach monthly.  CC SW to monitor chart every 45 days and available as needed.

## 2021-06-17 NOTE — PROGRESS NOTES
Audiology only; hearing aid evaluation (self-pay patient)    Vanda Sanchez was seen today for hearing aid evaluation. Medical clearance for amplification was not obtained prior to today's appointment, and was not medically necessary. The benefits and limitations of amplification were discussed, and a mutual decision was made to delay a decision at this time. Ms. Sanchez has access to the Epic discount program through her insurance carrier, and wished to explore those options as a cost-saving measure. She was provided with the insurance verification letter containing information regarding the Epic discount program and copies of her 4-7-21 audiogram and the [facility] consumer brochure on the purchase of amplification. Should she ultimately wish to pursue amplification through [facility], another hearing aid evaluation appointment may be made. Ms. Sanchez expressed verbal understanding of this information and plan. No charge appointment today.              Isaias Alvarado, [facility]  [place] Licensed Audiologist [id number]

## 2021-06-17 NOTE — PROGRESS NOTES
Care Coordination Clinician Chart Review   Situation: Patient chart reviewed by care coordinator.       Background: Care Coordination initial assessment and enrollment to Care Coordination was January 2021.   Patient centered goal(s) were developed with participation from patient.  MARVA THAKUR handed patient off to CHW for continued outreach every 30 days.        Assessment: Per chart review, patient outreach completed by CC CHW on 4/26/21.  Patient is actively working to accomplish goal(s).  Patient's goal(s) remain appropriate and relevant at this time.   Patient is due for updated Complex Care Plan.  Annual assessment will be due January 2022.      Goals        Patient Stated      Medical (pt-stated)      Goal Statement: I would like to look into hearing aides  Date Goal set: 1/13/21; Updated 3/19/21  Barriers: share of cost  Strengths: motivated  Date to Achieve By: 6 months  Patient expressed understanding of goal: yes    Action steps to achieve this goal:  1. I will schedule initial visit with Audiology when then contact me (scheduled 4/7/21)  2. I will follow through with recommendations from audiology           Psychosocial (pt-stated)      Goal Statement: I will meet with  to discuss financial situations that will allow me to remain in my home    Date Goal set: 3/19/21  Barriers: Debts  Strengths: Has legal resource  Date to Achieve By: 3 months  Patient expressed understanding of goal: yes    Action steps to achieve this goal:  1. I may decide to file for bankruptcy so I am able to make my house payments  2. I will ask for legal resources if needed                Plan/Recommendations: The patient will continue working with Care Coordination to achieve goal(s) as above.  CHW will involve MARVA THAKUR as needed or if patient is ready to move to maintenance.  MARVA THAKUR will continue to monitor progress to goals and CHW outreaches every 6 weeks.   Care Plan updated and mailed to patient: Yes

## 2021-06-17 NOTE — PROGRESS NOTES
Clinic Care Coordination Contact  [facility]/Voicemail    Clinical Data: Care Coordinator Outreach  Outreach attempted x 1.  Left message on patient's voicemail with call back information and requested return call.  Plan: Care Coordinator will try to reach patient again in 10 business days.    4/26/21 Rcvd rtn call fro patient LMTCB.    Next CHW outreach date 5/10/21

## 2021-06-17 NOTE — PROGRESS NOTES
Clinic Care Coordination Contact  [facility]    Clinical Data: Care Coordinator Outreach  Outreach attempted x 2.  CHW called x2 phone rang then there was a busy signal.  Plan: Care Coordinator will try to reach patient again in 10 business days.      Next CHW outreach date: 5/25/21

## 2021-06-19 NOTE — LETTER
Letter by Brianna Santos LICSW at      Author: Brianna Santos LICSW Service: -- Author Type: --    Filed:  Encounter Date: 10/3/2019 Status: Signed       Care Plan  About Me:    Patient Name:  Vanda Sanchez    YOB: 1935  Age:         84 y.o.   [facility] MRN:    [id number] Telephone Information:  Home Phone [phone]   Mobile [phone]       Address:  [address] [place], [place] [id number] Email address:  [email or web address]      Emergency Contact(s)  Extended Emergency Contact Information      Name: Tamara Márquez  Address:       [address] [place], [place]  Home Phone Number: [phone]  Relation: Friend      Name: Adia Murillo  Address:       [address] [place] [address]  Home Phone Number: [phone]  Work Phone Number: [phone]  Relation: Sibling      Name: Micki Mackey  Address:       [address] [place] [address]      [place] [address]  Relation: Friend      Name: Mitchell Rose  Address:       [address] [place] [address]  Home Phone Number: [phone]  Relation: Significant Other          Primary language:  English     needed? [employer]:  [phone] op. 1  Other communication barriers: Hearing impairment  Preferred Method of Communication:  Abigailhart  Current living arrangement: I live in a private home with roommates   Mobility Status/ Medical Equipment: Independent    Health Maintenance  Health Maintenance Reviewed: Not assessed    My Access Plan  Medical Emergency 911   Primary Clinic Line Adonis Saunders MD - [phone]   24 Hour Appointment Line [phone] or  [phone]) (toll-free)   24 Hour Nurse Line [phone] (toll-free)   [facility], [phone]   [facility]  [phone]   Preferred Pharmacy [id number] - [place], [place] - [address] [facility]      Behavioral Health Crisis Line The National Suicide Prevention Lifeline at 1-349.496.4918 or 911             My Care Team Members  Patient Care Team       Relationship Specialty Notifications Start End    Adonis Saunders MD PCP - General   1/17/13     Phone: [phone] Fax: [phone]         [address] [facility] [address]    Adonis Saunders MD Assigned PCP   7/28/19     Phone: [phone] Fax: [phone]         [address] [facility] [address]    Brianna Santos LICSW Lead Care Coordinator Primary Care - CC Admissions 10/3/19     Fax: [phone]         Coty Rubio CHW Community Health Worker Primary Care - CC Admissions 10/3/19     Phone: [phone] Fax: [phone]        Fidelia Calero RN Clinic Care Coordinator Primary Care - CC Admissions 10/3/19     Fax: [phone]                 My Care Plans  Self Management and Treatment Plan  Goals and (Comments)  Goals        General    Financial Wellbeing (pt-stated)     Notes - Note created  10/3/2019  1:37 PM by Brianna Santos LICSW    Goal Statement-I would like assistance finding out what my coverage is for dental surgery due to a parathyroid condition within the next 2 months.    Action steps to achieve this goal  1.  The  will consult with a Medicare Specialist through the Senior Linkage Line and follow up with me once they learn more.    Date goal set: 10/3/19          Psychosocial (pt-stated)     Notes - Note created  10/3/2019  1:38 PM by Brianna Santos LICSW    Goal Statement- I will decide in the next 2 months if I would like to pursue getting in-home services and transportation through [employer].     Action steps to achieve this goal  1.  The  has mailed me information for the county waiver programs and for [employer], I will review this information.  2.  I will notify the Community Health Worker if I decide I would like to pursue either service.    Date goal set:  10/3/19                   Advance Care Plans/Directives Type:   Type Advanced Care Plans/Directives: Advanced Directive - On File    My Medical and Care Information  Problem List   Patient Active Problem List   Diagnosis   ? Adenoma Of The Parathyroid Gland   ? Osteoporosis Senile   ? S/P parathyroidectomy   ? Accelerated hypertension   ? Compression fracture of L3 vertebra (H)   ? Obesity (BMI 35.0-39.9) with comorbidity (H)   ? Memory loss      Current Medications and Allergies:  See printed Medication Report.    Care Coordination Start Date: 10/3/2019   Frequency of Care Coordination:     Form Last Updated: 10/03/2019

## 2021-06-19 NOTE — LETTER
Letter by Brianna Santos LICSW at      Author: Brianna Santos LICSW Service: -- Author Type: --    Filed:  Encounter Date: 10/3/2019 Status: Signed       CARE COORDINATION  [facility]  [address] [place] [address]    October 3, 2019    Vanda Sanchez  [address] [facility]  [address]  [facility] [address]      Dear Vanda,    I am a clinic care coordinator who works with Adonis Saunders MD at [facility]. I wanted to thank you for spending the time to talk with me.  Below is a description of clinic care coordination and how I can further assist you.     The clinic care coordinator team is made up of a registered nurse,  and community health worker who understand the health care system. The goal of clinic care coordination is to help you manage your health and improve access to the health care system in the most efficient manner. The team can assist you in meeting your health care goals by providing education, coordinating services, strengthening the communication among your providers, assist you with any financial, behavioral, psychosocial, chemical dependency, counseling, and/or psychiatric resources if needed.    Please feel free to contact Coty Rubio, the Community Health Worker, at [phone] with any questions or concerns. We are focused on providing you with the highest-quality healthcare experience possible and that all starts with you.     Sincerely,   Brianna Santos  Enclosed: I have enclosed a copy of the Care Plan. This has helpful information and goals that we have talked about. Please keep this in an easy to access place to use as needed. I have also included an informational document on [employer] and a document on the county waiver programs.

## 2021-06-19 NOTE — PROGRESS NOTES
1. Did you experience new onset of achiness or rashes that lasted for over a month? no  2. Do you feel sick today - fever > 101F, or new deep cough? no  3. Did you have gastric bypass or parathyroid surgery in the last 6 months? no  4. Do you plan any dental implants or extractions in the next 2-3 months? no  5. Have you started any new medications in the last 6 months that you were told could affect your immune system? These may have been prescribed by Oncologist, Transplant Center, Rheumatology or Dermatology. No    Eun LIU CMA  12:40 PM  8/17/2018

## 2021-06-19 NOTE — LETTER
Letter by Brianna Santos LICSW at      Author: Brianna Santos LICSW Service: -- Author Type: --    Filed:  Encounter Date: 10/10/2019 Status: Signed          Vanda Sanchez  [address] [facility] [address]  [facility] [address]      October 10, 2019      Dear Ms. Berriostit,    I am sending you this letter per your request to assist you with communicating your needs for finding out your coverage for your dental work. Please have the doctor's office who will be completing your needed dental procedures submit a prior authorization to your insurance (must submit to Medicare first and then your secondary insurance after Medicare has responded). The doctor's office will want to code it in a way that is clarifying the procedures are needed due to your parathyroid condition.     Please reach out  with any additional questions by contacting Coty Rubio, the Community Health Worker, at [phone].    Sincerely,        Electronically signed by SHELLEY Pollock

## 2021-06-20 NOTE — LETTER
Letter by Dolores Poe LICSW at      Author: Dolores Poe LICSW Service: -- Author Type: --    Filed:  Encounter Date: 4/10/2020 Status: (Other)       Care Plan  About Me:    Patient Name:  Vanda Sanchez    YOB: 1935  Age:         84 y.o.   [facility] MR[facility]:    [id number] Telephone Information:  Home Phone [phone]   Mobile [phone]       Address:  [address] [place] [address] [address]  [facility] [address] Email address:  [email or web address]      Emergency Contact(s)  Extended Emergency Contact Information      Name: Tamara Márquez  Address:       [address] [place], [place]  Home Phone Number: [phone]  Relation: Friend      Name: Adia Murillo  Address:       [address] [place] [address]  Home Phone Number: [phone]  Work Phone Number: [phone]  Relation: Sibling      Name: Micki Mackey  Address:       [address] [place] [address]      [place] [address]  Relation: Friend      Name: Jamison,Mitchell  Address:       [address] [place] [address]  Home Phone Number: [phone]  Relation: Significant Other         My Access Plan  Medical Emergency 911   Primary Clinic Line Adonis Saunders MD - [phone]   24 Hour Appointment Line [phone] or  [phone]) (toll-free)   24 Hour Nurse Line [phone] (toll-free)   Preferred Urgent Care     Preferred Hospital     Preferred Pharmacy [id number] - [place], [place] - [address] [facility]     Behavioral Health Crisis Line The National Suicide Prevention Lifeline at 1-491.322.9131 or 911       My Care Team Members  Patient Care Team       Relationship Specialty Notifications Start End    Adonis Saunders MD PCP - General   1/17/13     Phone: [phone] Fax: [phone]         [address] [place] [address]    Adonis Saunders MD Assigned PCP   7/28/19     Phone: [phone] Fax: [phone]         [address] [place] [address]    Nodes,  Fidelia SOUSA, RN Clinic Care Coordinator Primary Care - CC Admissions 10/3/19     Fax: [phone]         Cara Bhardwaj CHW Community Health Worker Primary Care - CC Admissions 2/26/20     Phone: [phone] Fax: [phone]        Dolores Poe LICSW Lead Care Coordinator Primary Care - CC Admissions 3/2/20     Fax: [phone]                 My Care Plans  Goals and (Comments)  Goals        General    Financial Wellbeing (pt-stated)     Notes - Note edited  4/10/2020 12:54 PM by Dolores Poe LICSW    Goal Statement- I would like help in navigating my dental coverage so I can get my teeth pulled.    Action steps.   1. I will contact the Dental school to see if I can get in to get some teeth pulled.   2. My CHW will connect this the [facility] SW for resource information if needed  3. I will meet with the CHW or SW when I am ready to work on this goal        Updated: 3/13/2020, 4/10/20  Date goal set: 10/3/19                      My Medical and Care Information  Problem List   Patient Active Problem List   Diagnosis   ? Adenoma Of The Parathyroid Gland   ? Osteoporosis Senile   ? S/P parathyroidectomy   ? Accelerated hypertension   ? Compression fracture of L3 vertebra (H)   ? Obesity (BMI 35.0-39.9) with comorbidity (H)   ? Memory loss      Current Medications and Allergies:  See printed Medication Report.    Care Coordination Start Date: 10/3/2019   Frequency of Care Coordination:     Form Last Updated: 04/10/2020

## 2021-06-20 NOTE — LETTER
Letter by Dolores Poe LICSW at      Author: Dolores Poe LICSW Service: -- Author Type: --    Filed:  Encounter Date: 5/14/2020 Status: (Other)         Vanda Sanchez  [address] [facility] [address]  [facility] [address]      May 15, 2020      Dear Ms. Sanchez,    Please see enclosed information on resources for home delivered meals, community resources for home repair for adding a handrail to your stairs and [employer] Grand[employer] for transportation.      Please contact the [facility] office directly (located at the [facility]) for more information about scheduling a Mind Body medicine visit with Dr. Acosta- [phone].    I will follow up with you in one month.  If you need anything before then please contact the Community Health Worker at [phone].       Sincerely,  Dolores MUÑOZ Social Work Care Coordinator      Electronically signed by KENDELL DURHAM

## 2021-06-20 NOTE — LETTER
Letter by Dolores Poe LICSW at      Author: Dolores Poe LICSW Service: -- Author Type: --    Filed:  Encounter Date: 5/14/2020 Status: (Other)       CARE COORDINATION  [facility]  [address] [place] [address]    May 15, 2020    Vanda Sanchez  [address] [facility]  [address]  [facility] [address]      Dear Vanda,    ROCK am a clinic care coordinator  who works with Adonis Saunders MD. I wanted to thank you for spending the time to talk with me.  Below is a description of clinic care coordination and how I can further assist you.      The clinic care coordination team is made up of a registered nurse,  and community health worker who understand the health care system. The goal of clinic care coordination is to help you manage your health and improve access to the health care system in the most efficient manner. The team can assist you in meeting your health care goals by providing education, coordinating services, strengthening the communication among your providers and supporting you with any resource needs.    Please feel free to contact the Community Health Worker at [phone] with any questions or concerns. We are focused on providing you with the highest-quality healthcare experience possible and that all starts with you.     Sincerely,     Dolores MUÑOZ  Social Work Care Coordinator    Enclosed: I have enclosed a copy of the Care Plan. This has helpful information and goals that we have talked about. Please keep this in an easy to access place to use as needed.

## 2021-06-20 NOTE — LETTER
Letter by Dolores Poe LICSW at      Author: Dolores Poe LICSW Service: -- Author Type: --    Filed:  Encounter Date: 5/14/2020 Status: (Other)       Care Plan  About Me:    Patient Name:  Vanda Sanchez    YOB: 1935  Age:         84 y.o.   [facility] MRN:    [id number] Telephone Information:  Home Phone [phone]   Mobile [phone]       Address:  [address] [place] [address] [address]  [facility] [address] Email address:  [email or web address]      Emergency Contact(s)  Extended Emergency Contact Information      Name: Tamara Márquez  Address:       [address] [place], [place]  Home Phone Number: [phone]  Relation: Friend      Name: Adia Murillo  Address:       [address] [place] [address]  Home Phone Number: [phone]  Work Phone Number: [phone]  Relation: Sibling      Name: Micki Mackey  Address:       [address] [place] [address]      [place] [address]  Relation: Friend      Name: Aurora,Mitchell  Address:       [address] [place] [address]  Home Phone Number: [phone]  Relation: Significant Other            My Access Plan  Medical Emergency 911   Primary Clinic Line Adonis Saunders MD - [phone]   24 Hour Appointment Line [phone] or  [phone]) (toll-free)   24 Hour Nurse Line [phone] (toll-free)   Preferred Urgent Care [facility], [phone]   [facility]  [phone]   Preferred Pharmacy [facility] [place], [place] - [address] [facility]     Behavioral Health Crisis Line The National Suicide Prevention Lifeline at 1-385.480.3508 or 917     My Care Team Members  Patient Care Team       Relationship Specialty Notifications Start End    Adonis Saunders MD PCP - General   1/17/13     Phone: [phone] Fax: [phone] [address] [facility] [address]    Adonis Saunders MD Assigned PCP   7/28/19     Phone:  [phone] Fax: [phone]         [address] [facility] [address]    Fidelia Calero, RN Clinic Care Coordinator Primary Care - CC Admissions 10/3/19     Fax: [phone]         Cara Bhardwaj CHW Community Health Worker Primary Care - CC Admissions 2/26/20     Phone: [phone] Fax: [phone]        Dolores Poe LICSW Lead Care Coordinator Primary Care - CC Admissions 3/2/20     Fax: [phone]                 My Care Plans  Goals and (Comments)  Goals        General    Financial Wellbeing (pt-stated)     Notes - Note edited  5/15/2020 10:02 AM by Dolores Poe LICSW    Goal Statement- I would like help in navigating my dental coverage so I can get my teeth pulled.    Action steps.   1. I will contact resources given to me by CC SW- [facility] in [place] and [facility] in [place].     3. I will update the CHW or SW when I am ready to work on this goal    (Hold due to COVID-19, 5/7/20)    Date goal set: 10/3/19    Updated: 5/7/20; 5/14/20      Healthy Eating (pt-stated)     Notes - Note edited  5/15/2020 10:05 AM by Dolores Poe LICSW    Goal Statement: I will look into home delivered meal options    Date Goal set: 5/14/20  Barriers: transportation   Strengths: open to meal delivery  Date to Achieve By: July 1   Patient expressed understanding of goal: yes    Action steps to achieve this goal:  1. I will contact [facility] dining to see if I qualify for meals  2. I will request additional resources from CC SW if needed during next outreach call  3.  I will contact CC team if needed for assistance with this goal        Medical (pt-stated)     Notes - Note created  5/15/2020 10:08 AM by Dolores Poe LICSW    Goal Statement: I will contact [facility] for information about the Functional Medicine program with Dr. Acosta    Date Goal set: 5/1/20  Barriers: none identified  Strengths: interested in program   Date to Achieve By: July  1  Patient expressed understanding of goal: yes    Action steps to achieve this goal:  1. I will call to learn more about the program and schedule if I chose to participate  2. I will update CC SW on next outreach call      Reducing Risks (pt-stated)     Notes - Note created  5/15/2020  9:58 AM by Dolores Poe [facility]    Goal Statement: I would like hand rails on my front steps    Date Goal set: 5/13/20  Barriers: permission from association and financial   Strengths: motivated   Date to Achieve By: August 1  Patient expressed understanding of goal: yes    Action steps to achieve this goal:  1. I will contact [employer] to see if I qualify for assistance  2. I will speak to my homeowners association for permission needed  3. I will update CC team on outreach calls                 Advance Care Plans/Directives Type:   Type Advanced Care Plans/Directives: Advanced Directive - On File    My Medical and Care Information  Problem List   Patient Active Problem List   Diagnosis   ? Adenoma Of The Parathyroid Gland   ? Osteoporosis Senile   ? S/P parathyroidectomy   ? Accelerated hypertension   ? Compression fracture of L3 vertebra (H)   ? Obesity (BMI 35.0-39.9) with comorbidity (H)   ? Memory loss      Current Medications and Allergies:  See printed Medication Report.    Care Coordination Start Date: 10/3/2019   Frequency of Care Coordination: monthly   Form Last Updated: 05/15/2020

## 2021-06-21 NOTE — LETTER
Letter by Dolores Poe LICSW at      Author: Dolores Poe LICSW Service: -- Author Type: --    Filed:  Encounter Date: 1/13/2021 Status: (Other)       Care Plan  About Me:    Patient Name:  Vanda Sanchez    YOB: 1935  Age:         85 y.o.   [facility] MRN:    [id number] Telephone Information:  Home Phone [phone]   Mobile [phone]       Address:  [address]  [facility] [address] Email address:  [email or web address]      Emergency Contact(s)  Extended Emergency Contact Information      Name: Tamara Márquez  Address:       [address] [place], [place]  Home Phone Number: [phone]  Relation: Friend      Name: Lidia Adia  Address:       [address] [place] [address]  Home Phone Number: [phone]  Work Phone Number: [phone]  Relation: Sibling      Name: Micki Mackey  Address:       [address] [place] [address]      [place] [address]  Relation: Friend       Mobility Status/ Medical Equipment: Independent    Health Maintenance  Health Maintenance Reviewed: Up to date    My Access Plan  Medical Emergency 911   Primary Clinic Line Adonis Saunders MD - [phone]   24 Hour Appointment Line [phone] or  [phone] (toll-free)   24 Hour Nurse Line [phone] (toll-free)   Preferred Urgent Care [facility], [phone]   [facility]  [phone]   Preferred Pharmacy [id number] - [place], [place] - [address]     Behavioral Health Crisis Line The National Suicide Prevention Lifeline at 1-946.987.1388 or 911     My Care Team Members  Patient Care Team       Relationship Specialty Notifications Start End    Adonis Saunders MD PCP - General   1/17/13     Phone: [phone] Fax: [phone]         [address] [place] [address]    Adonis Saunders MD Assigned PCP   7/28/19     Phone: [phone] Fax: [phone]         [address] [place], [place] [address]  [facility] [address]    Cara Bhardwaj CHW Community Health Worker Primary Care - CC Admissions 12/15/20     Phone: [phone] Fax: [phone]        Dolores Poe LICSW Lead Care Coordinator Primary Care - CC  20     Fax: [phone]                 My Care Plans  Self Management and Treatment Plan  Goals and (Comments)  Goals        General    Functional (pt-stated)     Notes - Note created  2021  3:26 PM by Dolores Poe LICSW    Goal Statement: I would like to move into senior subsidized apartment  Date Goal set: 21  Barriers: needs to sell home, downsize personal belongings   Strengths: family is helping  Date to Achieve By: 6 months  Patient expressed understanding of goal: yes    Action steps to achieve this goal:  1. I will search on [employer] for senior subsidized buildings and call for more information/or to get on wait lists  2. I will request additional housing resources in the future if needed        Medical (pt-stated)     Notes - Note edited  2021  3:42 PM by Dolores Poe LICSW    Goal Statement: I would like to look into hearing aides  Date Goal set: 21  Barriers: share of cost  Strengths: motivated  Date to Achieve By: 6 months  Patient expressed understanding of goal: yes    Action steps to achieve this goal:  1. I will schedule initial visit with Audiology when then contact me  2. I will follow through with recommendations from audiology         Psychosocial (pt-stated)     Notes - Note edited  2021  3:41 PM by Dolores Poe LICSW    Goal Statement: I will downsize to prepare for moving  Date Goal set: 21  Barriers:  partner has a lot of belongings  Strengths: partner's family is helping  Date to Achieve By: 6 months  Patient expressed understanding of goal: yes    Action steps to achieve this goal:  1. I will contact [employer] or similar service to assist with downsizing as my sister will help with cost  2. I will  contact [facility] to see if they are interested in my  partners photography and tool collections  3. I will ask for additional resources for this if needed                Advance Care Plans/Directives Type:   Type Advanced Care Plans/Directives: Advanced Directive - On File    My Medical and Care Information  Problem List   Patient Active Problem List   Diagnosis   ? Adenoma Of The Parathyroid Gland   ? Osteoporosis Senile   ? S/P parathyroidectomy   ? Accelerated hypertension   ? Compression fracture of L3 vertebra (H)   ? Obesity (BMI 35.0-39.9) with comorbidity (H)   ? Memory loss      Current Medications and Allergies:  See printed Medication Report.    Care Coordination Start Date: 2021   Frequency of Care Coordination: monthly   Form Last Updated: 2021

## 2021-06-21 NOTE — LETTER
Letter by Dolores Poe LICSW at      Author: Dolores Poe LICSW Service: -- Author Type: --    Filed:  Encounter Date: 5/3/2021 Status: (Other)       Care Plan  About Me:    Patient Name:  Vanda Sanchez    YOB: 1935  Age:         85 y.o.   [facility] MRN:    [id number] Telephone Information:  Home Phone [phone]   Mobile [phone]       Address:  [address] [place] [address]  [facility] [address] Email address:  [email or web address]      Emergency Contact(s)  Extended Emergency Contact Information      Name: Tamara Márquez  Address:       [address] [place], [place]  Home Phone Number: [phone]  Relation: Friend      Name: Adia Murillo  Address:       [address] [place] [address]  Home Phone Number: [phone]  Work Phone Number: [phone]  Relation: Sibling      Name: Micki Mackey  Address:       [address] [place] [address]      [place] [address]  Relation: Friend              My Access Plan  Medical Emergency 911   Primary Clinic Line Adonis Saunders MD - [phone]   24 Hour Appointment Line [phone] or  [phone] (toll-free)   24 Hour Nurse Line [phone] (toll-free)   Preferred Urgent Care     [facility]     [id number] [place], [place] - [address]     Behavioral Health Crisis Line The National Suicide Prevention Lifeline at 1-761.581.6338 or 911     My Care Team Members  Patient Care Team       Relationship Specialty Notifications Start End    Adonis Saunders MD PCP - General   1/17/13     Phone: [phone] Fax: [phone]         [address] [place] [address]    Adonis Saunders MD Assigned PCP   7/28/19     Phone: [phone] Fax: [phone]         [address] [place] [address]    Cara Bhardwaj CHW Community Health Worker Primary Care - CC Admissions 12/15/20     Phone: [phone] Fax: [phone]        Dolores Poe, [facility] Lead Care  Coordinator Primary Care - CC  12/18/20     Fax: [phone]                 My Care Plans  Self Management and Treatment Plan  Goals and (Comments)  Goals        General    Medical (pt-stated)     Notes - Note edited  3/19/2021  2:51 PM by Dolores Poe LICSW    Goal Statement: I would like to look into hearing aides  Date Goal set: 1/13/21; Updated 3/19/21  Barriers: share of cost  Strengths: motivated  Date to Achieve By: 6 months  Patient expressed understanding of goal: yes    Action steps to achieve this goal:  1. I will schedule initial visit with Audiology when then contact me (scheduled 4/7/21)  2. I will follow through with recommendations from audiology         Psychosocial (pt-stated)     Notes - Note created  3/19/2021  2:57 PM by Dolores Poe LICSW    Goal Statement: I will meet with  to discuss financial situations that will allow me to remain in my home    Date Goal set: 3/19/21  Barriers: Debts  Strengths: Has legal resource  Date to Achieve By: 3 months  Patient expressed understanding of goal: yes    Action steps to achieve this goal:  1. I may decide to file for bankruptcy so I am able to make my house payments  2. I will ask for legal resources if needed                  My Medical and Care Information  Problem List   Patient Active Problem List   Diagnosis   ? Adenoma Of The Parathyroid Gland   ? Osteoporosis Senile   ? S/P parathyroidectomy   ? Accelerated hypertension   ? Compression fracture of L3 vertebra (H)   ? Obesity (BMI 35.0-39.9) with comorbidity (H)   ? Memory loss   ? Sensorineural hearing loss, bilateral      Current Medications and Allergies:  See printed Medication Report.    Care Coordination Start Date: 1/13/2021   Frequency of Care Coordination: monthly   Form Last Updated: 05/03/2021

## 2021-06-25 NOTE — PROGRESS NOTES
Clinic Care Coordination Contact  [facility]/Voicemail    Clinical Data: Care Coordinator Outreach  Outreach attempted x 1.  Left message on patient's voicemail with call back information and requested return call.  Plan: Care Coordinator will try to reach patient again in 10 business days.    RTN MIRYAM from Vanda on 5/20/21    Next CHW Outreach date: 6/9/21

## 2021-06-26 NOTE — PROGRESS NOTES
Clinic Care Coordination Contact  [facility] [[email or web address]Voicemail    Clinical Data: Care Coordinator Outreach  Outreach attempted x 2.  Left message on patient's voicemail with call back information and requested return call.  Plan: Care Coordinator will rount patients chart to [facility] SW to review for disenrollment.

## 2021-06-26 NOTE — PROGRESS NOTES
Clinic Care Coordination Contact    Assessment: CHW has been unable to reach pt for follow up.  Per chart review pt attend audiology appointments and has information about hear aids.  Goal completed.  Per last conversation with Care Coordinator pt voiced wanting to stay in her home and doing what she needed to do financially to make that happen. Pt continues living in her own home with family.   Goal completed.    Enrollment status: Maintenance      Plan: CHW set outreach for 2 months.  If no new needs identified at next outreach send chart to CC to review for graduation.

## 2021-06-28 NOTE — PROGRESS NOTES
"Progress Notes by Brianna Santos LICSW at 10/3/2019  1:00 PM     Author: Brianna Santos LICSW Service: -- Author Type:     Filed: 10/3/2019  1:56 PM Encounter Date: 10/3/2019 Status: Signed    : Brianna Santos LICSW ()       Pt reported that she recently got some memory testing done and reported it was inconclusive and she plans to f/u in a year. Pt reported she had been noticing some forgetfulness and confusion, however, once she stopped the blood pressure medication she improved. Pt reported she has noticed she is still \"slower\" but feels she can function well.     Pt reported a neighbor and a friend help with transportation for appts and running errands. SW discussed [employer], pt reported she would be interested in additional information about the program and will decide if she wants to pursue this.    Pt reported she's able to afford her bills but has been struggling more lately. Pt reported she gets $1,291/month from the SSA after deductions pt gets $1,058/month; pt also reported she gets $389/month from a pension. SW discussed MA and Medicare Savings with FRW, pt is over income.    Pt's Medicare supplement through [employer] does not cover dental, however, pt reported she has parathyroid and was informed that Medicare would cover surgeries required for this condition. SW informed pt SW will have to look into this, pt understood--SW will f/u once SW finds out more.    Pt reported that she has been struggling to get things done around the home but feels this will improve, was open to SW mailing her information about EW and AC Waiver.     Clinic Care Coordination Contact    Clinic Care Coordination Contact  OUTREACH    Referral Information:  Referral Source: PCP    Primary Diagnosis: Psychosocial    Chief Complaint   Patient presents with   ? Clinic Care Coordination - Initial        Universal Utilization:   Clinic Utilization  Difficulty keeping appointments:: " Yes(due to transportation )  Compliance Concerns: No  No-Show Concerns: No  No PCP office visit in Past Year: No  Utilization    Last refreshed: 10/3/2019  1:25 PM:  Hospital Admissions 0           Last refreshed: 10/3/2019  1:25 PM:  ED Visits 0           Last refreshed: 10/3/2019  1:25 PM:  No Show Count (past year) 1              Current as of: 10/3/2019  1:25 PM              Clinical Concerns:  Current Medical Concerns:  Pt needing dental surgery, pt reported she has noticed a decline in her memory--recently had testing and will f/u in a year.  Current Behavioral Concerns: None    Education Provided to patient: Medicare coverage for Part A and B   Pain  Pain (GOAL):: No  Health Maintenance Reviewed: Not assessed     Medication Management: Pt currently not taking any prescriptions, only vitamins.       Functional Status:  Dependent ADLs:: Independent  Dependent IADLs:: Transportation  Bed or wheelchair confined:: No  Mobility Status: Independent  Fallen 2 or more times in the past year?: Yes  Any fall with injury in the past year?: No    Living Situation:  Current living arrangement:: I live in a private home(I own the home and my friend and his adult son live with me)  Type of residence:: Private home - stairs([facility])    Diet/Exercise/Sleep:  Diet:: Regular  Inadequate nutrition (GOAL):: No  Food Insecurity: No(Pt reported her roommates help with cost of food)  Tube Feeding: No  Exercise:: Currently not exercising(Pt having stiffness due to parathyroid issues, would like to be able to exercise more)  Inadequate activity/exercise (GOAL):: No  Significant changes in sleep pattern (GOAL): No(Pt feels she gets enough sleep but does not sleep all throughout the night. )    Transportation:  Transportation concerns (GOAL):: Yes  Transportation means:: Friend, Regular car     Psychosocial:  Yarsanism or spiritual beliefs that impact treatment:: No  Mental health DX:: No  Mental health management concern (GOAL)::  No  Informal Support system:: Friends, Neighbors(Neighbor helps with transprotation and running errands; another friend takes pt to the doctor often)     Financial/Insurance:   Financial/Insurance concerns (GOAL):: Yes(Pt can afford her bills but reported it is getting more difficult. Pt has Medicare and [employer] supplement )     Resources and Interventions: SW will assist with navigating Medicare coverage for parathyroid dental surgery.  Current Resources: friend and family support.    Supplies used at home:: None  Equipment Currently Used at Home: walker, standard, walker, rolling, cane, straight, shower chair, grab bar, tub/shower, grab bar, toilet    Advance Care Plan/Directive  Advanced Care Plans/Directives on file:: Yes  Type Advanced Care Plans/Directives: Advanced Directive - On File    Goals        Patient Stated    ? Financial Wellbeing (pt-stated)      Goal Statement-I would like assistance finding out what my coverage is for dental surgery due to a parathyroid condition within the next 2 months.    Action steps to achieve this goal  1.  The  will consult with a Medicare Specialist through the Senior Linkage Line and follow up with me once they learn more.    Date goal set: 10/3/19          ? Psychosocial (pt-stated)      Goal Statement- I will decide in the next 2 months if I would like to pursue getting in-home services and transportation through [employer].     Action steps to achieve this goal  1.  The  has mailed me information for the county waiver programs and for [employer], I will review this information.  2.  I will notify the Community Health Worker if I decide I would like to pursue either service.    Date goal set: 10/3/19                  Patient/Caregiver understanding: Pt understanding of current goal and enrollment status.        Future Appointments              In 5 months Adonis Saunders MD [facility], [facility]    In  12 months Steven Vines MD [facility],           Plan: SW will look into Medicare coverage for parathyroid dental surgery; pt will review information for [facility] Mob[facility] and county waiver programs and notify CHW if she would lilke to pursue either.

## 2021-06-29 NOTE — PROGRESS NOTES
Progress Notes by Dolores Poe LICSW at 5/14/2020  2:00 PM     Author: Dolores Poe LICSW Service: -- Author Type:     Filed: 5/15/2020  1:43 PM Encounter Date: 5/14/2020 Status: Signed    : Dolores Poe LICSW ()       Clinic Care Coordination Contact    Clinic Care Coordination Contact  OUTREACH- RE ASSESSMENT    Referral Information:  Referral Source: PCP    Primary Diagnosis: Psychosocial    Chief Complaint   Patient presents with   ? Clinic Care Coordination - Follow-up     RE- ASSESSMENT        Universal Utilization:   Clinic Utilization  Difficulty keeping appointments:: Yes(due to transportation )  Compliance Concerns: No  No-Show Concerns: No  No PCP office visit in Past Year: No  Utilization    Last refreshed: 5/15/2020 10:04 AM:  Hospital Admissions 0           Last refreshed: 5/15/2020 10:04 AM:  ED Visits 0           Last refreshed: 5/15/2020 10:04 AM:  No Show Count (past year) 0              Current as of: 5/15/2020 10:04 AM              Clinical Concerns:  Current Medical Concerns:  Pt [facility]ines [facility].  She is not interested in continuing with another neurological  provider.  CC SW discussed Functional Medicine program with Dr. Acosta at [facility] and pt expressed interest    Discussed pt needing to get teeth extracted and provided sliding fee scale dental resources: [facility]- [facility]     Current Behavioral Concerns: None identified  Education Provided to patient: Community resource info, [facility]    Pain  Pain (GOAL):: No       Medication Management: Pt does not take medications, only OTC vitamins    Functional Status:  Dependent ADLs:: Independent  Dependent IADLs:: Transportation  Bed or wheelchair confined:: No  Mobility Status: Independent  Fallen 2 or more times in the past year?: No  Any fall with injury in the past year?: No    Living Situation:    Pt  says she will have to sell her condo and move if her roommate passes away or has to move out.  Pts income is Social Security and a pension.  Pt mentioned she has quite a lot of credit card debt which she says would be taken from profit of home sale.  Pts roommates family delivers food and roommates medications.  Pt used to have a close friend who would drive her places but she recently passed away.  Another neighbor has offered to help with transportation but she has not taken her up on it.  She does use medical transportation.  She is not interested in [employer].     Current living arrangement:: I live in a private home  Type of residence:: Town home(Condo;  Mortgage and association feeds $700 have roommates 81 year old man and his 51 year old son with etoh use)    Lifestyle & Psychosocial Needs:     Social Needs   ? Financial resource strain: Somewhat hard   ? Food insecurity     Worry: Never true     Inability: Never true   ? Transportation needs     Medical: No     Non-medical: No     Diet:: Regular  Inadequate nutrition (GOAL):: No  Tube Feeding: No  Inadequate activity/exercise (GOAL):: No  Significant changes in sleep pattern (GOAL): No  Transportation means:: Other, Medical transport(Friend who provided transportation passed away, neighbor has offered to help.  Roommates cousin drops off food and meds)     Mormon or spiritual beliefs that impact treatment:: No  Mental health DX:: No  Mental health management concern (GOAL):: No  Informal Support system:: Friends   Socioeconomic History   ? Marital status: Single     Spouse name: Not on file   ? Number of children: Not on file   ? Years of education: Not on file   ? Highest education level: Not on file     Tobacco Use   ? Smoking status: Never Smoker   ? Smokeless tobacco: Never Used   Substance and Sexual Activity   ? Alcohol use: No   ? Drug use: No   ? Sexual activity: Never       Resources and Interventions:  Current Resources:   Supplies used at  home:: None  Equipment Currently Used at Home: cane, quad    Advance Care Plan/Directive  Advanced Care Plans/Directives on file:: Yes  Type Advanced Care Plans/Directives: Advanced Directive - On File  Advanced Care Plan/Directive Status: Not Applicable    Referrals Placed: Meals on Wheels, Transportation, Community Resources([facility], [facility] [employer], [facility] [employer], [facility], [facility])     Goals:   Goals        General    Financial Wellbeing (pt-stated)     Notes - Note edited  5/15/2020 10:02 AM by Dolores Poe LICSW    Goal Statement- I would like help in navigating my dental coverage so I can get my teeth pulled.    Action steps.   1. I will contact resources given to me by CC SW- [facility] in [place] and [facility] in [place].     3. I will update the CHW or SW when I am ready to work on this goal    (Hold due to COVID-19, 5/7/20)    Date goal set: 10/3/19    Updated: 5/7/20; 5/14/20      Healthy Eating (pt-stated)     Notes - Note edited  5/15/2020 10:05 AM by Dolores Poe LICSW    Goal Statement: I will look into home delivered meal options    Date Goal set: 5/14/20  Barriers: transportation   Strengths: open to meal delivery  Date to Achieve By: July 1   Patient expressed understanding of goal: yes    Action steps to achieve this goal:  1. I will contact [facility] to see if I qualify for meals  2. I will request additional resources from CC SW if needed during next outreach call  3.  I will contact CC team if needed for assistance with this goal        Medical (pt-stated)     Notes - Note created  5/15/2020 10:08 AM by Dolores Poe LICSW    Goal Statement: I will contact [facility] for information about the Functional Medicine program with Dr. Acosta    Date Goal set: 5/1/20  Barriers: none identified  Strengths: interested in program   Date to Achieve By: July 1  Patient expressed  understanding of goal: yes    Action steps to achieve this goal:  1. I will call to learn more about the program and schedule if I chose to participate  2. I will update CC SW on next outreach call      Reducing Risks (pt-stated)     Notes - Note created  5/15/2020  9:58 AM by Dolores Poe [facility]    Goal Statement: I would like hand rails on my front steps    Date Goal set: 5/13/20  Barriers: permission from association and financial   Strengths: motivated   Date to Achieve By: August 1  Patient expressed understanding of goal: yes    Action steps to achieve this goal:  1. I will contact [employer] and [employer] to see if I qualify for assistance  2. I will speak to my homeowners association for permission needed  3. I will update CC team on outreach calls              Patient/Caregiver understanding: Pt agreeable to follow up from Care Coordination     Outreach Frequency: monthly  Future Appointments              In 3 weeks [facility] CCC SW [facility] Clinical Support, [facility]          Plan: Per pt request CC SW to follow up in one month, no CHW outreach at this time

## 2021-06-30 NOTE — PROGRESS NOTES
Progress Notes by Dolores Poe LICSW at 1/13/2021  2:00 PM     Author: Dolores Poe LICSW Service: -- Author Type:     Filed: 1/14/2021  9:56 AM Encounter Date: 1/13/2021 Status: Addendum    : Dolores Poe LICSW ()    Related Notes: Original Note by Dolores Poe LICSW () filed at 1/13/2021  4:28 PM       1/14/21 ADDENDUM:  Called pt and provided number for [facility] Audiology for hearing aid assessment after PCP placed order    Clinic Care Coordination Contact    Clinic Care Coordination Contact  OUTREACH- Reassessment    Referral Information:  Referral Source: PCP    Primary Diagnosis: Psychosocial    Chief Complaint   Patient presents with   ? Clinic Care Coordination - Initial     Reassessment         Universal Utilization:   Clinic Utilization  Difficulty keeping appointments:: Yes(due to transportation )  Compliance Concerns: No  No-Show Concerns: No  No PCP office visit in Past Year: No  Utilization    Last refreshed: 1/13/2021  3:35 PM: Hospital Admissions 0           Last refreshed: 1/13/2021  3:35 PM: ED Visits 0           Last refreshed: 1/13/2021  3:35 PM: No Show Count (past year) 0              Current as of: 1/13/2021  3:35 PM              Clinical Concerns:  Current Medical Concerns:  Hx compression fracture, osteoporosis, hypertension  Current Behavioral Concerns: recent loss of partner       Education Provided to patient:     [employer]  [address] [place] [address]  [phone]    [facility]   Director of the Cultural Division  Merlin Deegan Mike LaRoque  [phone]    [employer]  [email or web address]    Meals on Wheels- pt declined     Medication Management:  independent    Functional Status:  Dependent ADLs:: Independent, Ambulation-no assistive device  Dependent IADLs:: Independent  Bed or wheelchair confined:: No  Mobility Status: Independent  Fallen 2 or more times in the past year?: No  Any  fall with injury in the past year?: No    Living Situation:  Current living arrangement:: I live in a private home with family(Lives with partner's son, granddaughter/great-grandchildren (ages 3 and 11))  Type of residence:: Other([facility], with step son Kareem, and step daughter Alisson)    Lifestyle & Psychosocial Needs:    Pts partner passed away this fall.  His family has moved in temporarily to help with his affairs and help pt prepare to sell her town home.  Pt believes it is worth around $115-$120K.  She would like to move into senior housing.  CC SW provided resources for downsizing and housing (see above).     Pt shared she is over income for Medicare Savings Program through the [facility].  Confirmed pt has Medicare Advantage plan with Medic.  She would like to get hearing aides and believes she has partial coverage through insurance.    Pt says she will be over income for Energy Assistance due to family she has living in the home.    Pt declined referral to Meals on Wheels.  She prefers using [employer].     Pt shares she had 6 teeth pulled recently at the dentist.      Social Needs   ? Financial resource strain: Somewhat hard   ? Food insecurity     Worry: Never true     Inability: Never true   ? Transportation needs     Medical: No     Non-medical: No     Diet:: Regular(No bread products)  Inadequate nutrition (GOAL):: No  Tube Feeding: No  Inadequate activity/exercise (GOAL):: No  Significant changes in sleep pattern (GOAL): No  Transportation means:: Other, Medical transport(Friend who provided transportation passed away, neighbor has offered to help.  Roommates cousin drops off food and meds)     Rastafarian or spiritual beliefs that impact treatment:: No  Mental health DX:: No  Mental health management concern (GOAL):: No  Chemical Dependency Status: Not Applicable  Informal Support system:: Friends, Family   Socioeconomic History   ? Marital status: Single     Spouse name: Not on file   ? Number of  children: Not on file   ? Years of education: Not on file   ? Highest education level: Not on file     Tobacco Use   ? Smoking status: Never Smoker   ? Smokeless tobacco: Never Used   Substance and Sexual Activity   ? Alcohol use: No   ? Drug use: No   ? Sexual activity: Never           Resources and Interventions:  Current Resources:      Community Resources: Other (see comment)(Household member has SNAP; [employer] food shelf)  Supplies Currently Used at Home: None  Equipment Currently Used at Home: none  Type of Employment: Retired(Social Security + small pension = approx $1500 month)       Advance Care Plan/Directive  Advanced Care Plans/Directives on file:: Yes  Type Advanced Care Plans/Directives: Advanced Directive - On File  Advanced Care Plan/Directive Status: Not Applicable    Referrals Placed: Community Resources([employer], [employer], [employer])     Goals:   Goals        General    Functional (pt-stated)     Notes - Note created  1/13/2021  3:26 PM by Dolores Poe LICSW    Goal Statement: I would like to move into senior subsidized apartment  Date Goal set: 1/13/21  Barriers: needs to sell home, downsize personal belongings   Strengths: family is helping  Date to Achieve By: 6 months  Patient expressed understanding of goal: yes    Action steps to achieve this goal:  1. I will search on [employer] for senior subsidized buildings and call for more information/or to get on wait lists  2. I will request additional housing resources in the future if needed        Medical (pt-stated)     Notes - Note edited  1/13/2021  3:42 PM by Dolores Poe LICSW    Goal Statement: I would like to look into hearing aides  Date Goal set: 1/13/21  Barriers: share of cost  Strengths: motivated  Date to Achieve By: 6 months  Patient expressed understanding of goal: yes    Action steps to achieve this goal:  1. I will schedule initial visit with Audiology when then contact me  2. I will follow  through with recommendations from audiology         Psychosocial (pt-stated)     Notes - Note edited  2021  3:41 PM by Dolores Poe, [facility]    Goal Statement: I will downsize to prepare for moving  Date Goal set: 21  Barriers:  partner has a lot of belongings  Strengths: partner's family is helping  Date to Achieve By: 6 months  Patient expressed understanding of goal: yes    Action steps to achieve this goal:  1. I will contact [employer] or similar service to assist with downsizing as my sister will help with cost  2. I will contact [employer] to see if they are interested in my  partners photography and tool collections  3. I will ask for additional resources for this if needed             Patient/Caregiver understanding: Pt agreeable to follow up from CC team    Outreach Frequency: monthly      Plan:     CC SW to message PCP re: audiology referral    CC SW to outreach in one month, no CHW outreach needed at this time

## 2021-06-30 NOTE — PROGRESS NOTES
"Progress Notes by Imelda Yo AuD at 4/7/2021 12:30 PM     Author: Imelda Yo AuD Service: -- Author Type: Audiologist    Filed: 4/7/2021  1:22 PM Encounter Date: 4/7/2021 Status: Signed    : Imelda Yo AuD (Audiologist)       Audiology only; referred by Adonis Saunders    Summary:  Audiology visit completed. Please see audiogram below or under \"media\" tab for history and results.    Unable to mask for bone at 250 Hz - vibrotactile response, per patient.    Transducer:  Both insert phones and circumaural headphones were used.    Reliability:    Good    Recommendations:  Follow-up with PCP; retest hearing annually (to monitor) or per medical management/patient concern.  Wear hearing protection consistently in noise to preserve residual hearing sensitivity and to minimize the effects of tinnitus.  Vanda Sanchez is a potential binaural amplification candidate, if patient motivation exists and medical clearance is granted. Hearing aid evaluation was scheduled 5-14-21. Ms. Sanchez expressed verbal understanding of this information and plan.    Isaias Alvarado, [facility]  [place] Licensed Audiologist [id number]           "

## 2021-07-03 NOTE — ADDENDUM NOTE
Addendum Note by Bloch, Lisa M, CMA at 8/15/2017  1:17 PM     Author: Bloch, Lisa M, CMA Service: -- Author Type: Certified Medical Assistant    Filed: 8/15/2017  1:17 PM Encounter Date: 8/15/2017 Status: Signed    : Bloch, Lisa M, CMA (Certified Medical Assistant)    Addended by: BLOCH, LISA M on: 8/15/2017 01:17 PM        Modules accepted: Orders

## 2021-07-03 NOTE — ADDENDUM NOTE
Addendum Note by Bloch, Lisa M, CMA at 2/15/2018  5:20 PM     Author: Bloch, Lisa M, CMA Service: -- Author Type: Certified Medical Assistant    Filed: 2/15/2018  5:20 PM Encounter Date: 2/15/2018 Status: Signed    : Bloch, Lisa M, CMA (Certified Medical Assistant)    Addended by: BLOCH, LISA M on: 2/15/2018 05:20 PM        Modules accepted: Orders

## 2021-07-03 NOTE — ADDENDUM NOTE
Addendum Note by Jens Chapa at 4/9/2019  2:40 PM     Author: Jens Chapa Service: -- Author Type:     Filed: 4/10/2019 12:07 PM Encounter Date: 4/9/2019 Status: Signed    : Jens Chapa ()    Addended by: JENS CHAPA on: 4/10/2019 12:07 PM        Modules accepted: Orders

## 2021-07-03 NOTE — ADDENDUM NOTE
Addendum Note by Bobbi Lawson at 8/15/2017  1:22 PM     Author: Bobbi Lawson Service: -- Author Type:     Filed: 8/15/2017  1:22 PM Encounter Date: 8/15/2017 Status: Signed    : Bobbi Lawson ()    Addended by: BOBBI LAWSON on: 8/15/2017 01:22 PM        Modules accepted: Orders

## 2021-07-13 NOTE — PROGRESS NOTES
Assessment/Plan:      Visit for Preoperative Exam.       No contraindications for the planned procedure.  CBC, CMP, APTT,INR,UA, type and cross, EKG  done today.  EKG per my review showed normal sinus rhythm with no ST-T changes.  You should stop taking aspirin and NSAID's ( ibuprofene, naproxen) 7-10 days prior procedure.  You should stop all supplements 10 days prior procedure ( fish oil, Q10, vitamins, etc).    Please take all your heart medications that you usually take in the morning on the morning of your procedure.    Patient received second Prolia injection today for osteoporosis management.  She will continue with the Prolia injection every 6 months.  She had a bone density scan done today which I reviewed with her.  Blood pressure was high today but she is not taking her medications for a few months now.  I refilled both of her medications.  Prior authorization was done for Benicar and it was approved by her insurance.  She should also take a drowsing.  I discussed with her importance of keeping her blood pressure well managed.  She will follow-up with me in a month.    Subjective:     Scheduled Procedure: L2-3, L3-4, L4-5 decompressive laminectomy and medial facetectomy  Surgery Date:  8/17/17  Surgery Location:  [address]  Surgeon:  Dr. Dupree    82-year-old female with history of osteoporosis and vertebral compression fractures she is now scheduled for decompressive laminectomy because of the severe spinal stenosis and bilateral lower extremity symptoms.  Osteoporosis treatment with Prolia was started a year ago.  She has history of hyperparathyroidism and had surgery a few years ago.  Since then calcium is staying in the normal range, PTH was checked recently, vitamin D still low.  She is taking 2000 international units a day.  She has hypertension and for some reason was not able to get Benicar because of the insurance coverage and at the same time decided to stop hydralazine to  "because she did not think it was helping.  Today and over the last few weeks, her blood pressure stays elevated.    Current Outpatient Prescriptions   Medication Sig Dispense Refill     cholecalciferol, vitamin D3, (VITAMIN D3) 2,000 unit Tab Take by mouth.       hydrALAZINE (APRESOLINE) 25 MG tablet Take 1 tablet (25 mg total) by mouth 3 (three) times a day. 270 tablet 3     olmesartan (BENICAR) 40 MG tablet Take 1 tablet (40 mg total) by mouth daily. 90 tablet 3     No current facility-administered medications for this visit.        Allergies   Allergen Reactions     Amlodipine Besylate Rash     Atenolol      Fatigue       Carvedilol      Annotation: \"Cramps.\"       Clonidine Nausea Only and Nausea And Vomiting     Enalapril Maleate      Hydrochlorothiazide      Lisinopril        Immunization History   Administered Date(s) Administered     Influenza high dose, seasonal 12/05/2016     Influenza, Seasonal, Inj PF 09/24/2013     Influenza, inj, historic 12/09/2005, 10/01/2008, 10/06/2009, 10/27/2011     Pneumo Conj 13-V (2010&after) 07/24/2015     Pneumo Polysac 23-V 09/24/2013     Tdap 01/02/2009, 07/24/2015     ZOSTER 01/01/2015       Patient Active Problem List   Diagnosis     Adenoma Of The Parathyroid Gland     Osteoporosis Senile     S/P parathyroidectomy     Lower extremity weakness     Low back pain     Accelerated hypertension     Compression fracture of L3 lumbar vertebra       Past Medical History:   Diagnosis Date     Arthritis      Disease of thyroid gland      History of transfusion      Hypertension        Social History     Social History     Marital status: Single     Spouse name: N/A     Number of children: N/A     Years of education: N/A     Occupational History     Not on file.     Social History Main Topics     Smoking status: Never Smoker     Smokeless tobacco: Never Used     Alcohol use No     Drug use: No     Sexual activity: No     Other Topics Concern     Not on file     Social History " Narrative       Past Surgical History:   Procedure Laterality Date     APPENDECTOMY       EYE SURGERY      cataract bilateral     IA TOTAL HIP ARTHROPLASTY      Description: Total Hip Replacement;  Recorded: 01/17/2013;     TONSILLECTOMY         History of Present Illness  Recent Health  Fever: no  Chills: no  Fatigue: no  Chest Pain: no  Cough: no  Dyspnea: no  Urinary Frequency: no  Nausea: no  Vomiting: no  Diarrhea: no  Abdominal Pain: no  Easy Bruising: no  Lower Extremity Swelling: no  Poor Exercise Tolerance: no    Most recent Health Maintenance Visit:  5 month(s) ago    Pertinent History  Prior Anesthesia: yes  Previous Anesthesia Reaction:  no  Diabetes: no  Cardiovascular Disease: no  Pulmonary Disease: no  Renal Disease: no  GI Disease: no  Sleep Apnea: no  Thromboembolic Problems: no  Clotting Disorder: no  Bleeding Disorder: no  Transfusion Reaction: no  Impaired Immunity: no  Steroid use in the last 6 months: yes-cortisone shot-3 months ago  Frequent Aspirin use: no    Family history of MI-sister, brother, father    Social history of patient does not wear denture or partial plates, there is no transfusion refusal and there are no concerns regarding care after surgery    After surgery, the patient plans to recover at home with family.    Review of Systems  Constitutional: Negative.    HENT: Negative.    Eyes: Negative.    Respiratory: Negative.    Cardiovascular: Negative.    Gastrointestinal: Negative.    Endocrine: Negative.    Genitourinary: Negative.    Musculoskeletal: Negative.    Skin: Negative.              Objective:         Vitals:    08/15/17 1238   BP: 174/90   Pulse: 70   Weight: 215 lb (97.5 kg)       Physical Exam:  Constitutional:  oriented to person, place, and time, appears well-nourished. No distress.   HENT:   Head: Normocephalic.   Mouth/Throat: Oropharynx is clear and moist.   Eyes: Conjunctivae are normal. Pupils are equal, round, and reactive to light.   Neck: Normal range of  motion. Neck supple.   Cardiovascular: Normal rate, regular rhythm and normal heart sounds.    Pulmonary/Chest: Effort normal and breath sounds normal.   Abdominal: Soft. Bowel sounds are normal.   Musculoskeletal: Normal range of motion.   Neurological: alert and oriented to person, place, and time. Skin: Skin is warm.   Psychiatric: normal mood and affect.        Xenograft Text: The defect edges were debeveled with a #15 scalpel blade.  Given the location of the defect, shape of the defect and the proximity to free margins a xenograft was deemed most appropriate.  The graft was then trimmed to fit the size of the defect.  The graft was then placed in the primary defect and oriented appropriately.

## 2021-08-26 ENCOUNTER — PATIENT OUTREACH (OUTPATIENT)
Dept: NURSING | Facility: CLINIC | Age: 86
End: 2021-08-26

## 2021-08-26 ENCOUNTER — PATIENT OUTREACH (OUTPATIENT)
Dept: CARE COORDINATION | Facility: CLINIC | Age: 86
End: 2021-08-26

## 2021-08-26 NOTE — PROGRESS NOTES
Clinic Care Coordination Contact  [facility]/Voicemail    Clinical Data: Care Coordinator Outreach  Outreach attempted x 1.  Left message on patient's voicemail with call back information and requested return call.  Plan: Care Coordinator will try to reach patient again in 10 business days.    ** Patient on CCC Maintenance since 6/11/21.    Next CHW outreach date: 9/9/21

## 2021-08-26 NOTE — PROGRESS NOTES
Clinic Care Coordination Contact    CHW spoke with patient today. Patient is currently on CCC Maintenance. Patient would like to look into PCA services for assistance. CHW assisted patient in making phone appointment with CCC MARVA on 8/31/21 @ 3:00.    ** CHW did not create goal today for looking into PCA services. Santy please create goal for PCA services and take off of CCC Maintenance if you find appropriate.     Next CHW follow up date: 9/28/21

## 2021-08-31 ENCOUNTER — PATIENT OUTREACH (OUTPATIENT)
Dept: NURSING | Facility: CLINIC | Age: 86
End: 2021-08-31

## 2021-08-31 SDOH — ECONOMIC STABILITY: TRANSPORTATION INSECURITY
IN THE PAST 12 MONTHS, HAS THE LACK OF TRANSPORTATION KEPT YOU FROM MEDICAL APPOINTMENTS OR FROM GETTING MEDICATIONS?: NO

## 2021-08-31 SDOH — ECONOMIC STABILITY: FOOD INSECURITY: WITHIN THE PAST 12 MONTHS, YOU WORRIED THAT YOUR FOOD WOULD RUN OUT BEFORE YOU GOT MONEY TO BUY MORE.: NEVER TRUE

## 2021-08-31 SDOH — ECONOMIC STABILITY: FOOD INSECURITY: WITHIN THE PAST 12 MONTHS, THE FOOD YOU BOUGHT JUST DIDN'T LAST AND YOU DIDN'T HAVE MONEY TO GET MORE.: NEVER TRUE

## 2021-08-31 SDOH — ECONOMIC STABILITY: TRANSPORTATION INSECURITY
IN THE PAST 12 MONTHS, HAS LACK OF TRANSPORTATION KEPT YOU FROM MEETINGS, WORK, OR FROM GETTING THINGS NEEDED FOR DAILY LIVING?: NO

## 2021-08-31 ASSESSMENT — SOCIAL DETERMINANTS OF HEALTH (SDOH)
HOW HARD IS IT FOR YOU TO PAY FOR THE VERY BASICS LIKE FOOD, HOUSING, MEDICAL CARE, AND HEATING?: NOT VERY HARD
IN A TYPICAL WEEK, HOW MANY TIMES DO YOU TALK ON THE PHONE WITH FAMILY, FRIENDS, OR NEIGHBORS?: MORE THAN THREE TIMES A WEEK
HOW OFTEN DO YOU GET TOGETHER WITH FRIENDS OR RELATIVES?: MORE THAN THREE TIMES A WEEK

## 2021-08-31 NOTE — PROGRESS NOTES
Clinic Care Coordination Contact    Follow Up Progress Note      Assessment: Spoke with pt about her options for help at home.  She has spoken with [facility] in the past but has been over income.  Advised her to contact [facility] again to see if she is now eligible.  Discussed that she potentially could qualify with a share of cost.     Discussed private pay caregiver resources are available if she does not qualify for [facility] programs.       Care Gaps:    Health Maintenance Due   Topic Date Due     ZOSTER IMMUNIZATION (2 of 3) 02/26/2015     MEDICARE ANNUAL WELLNESS VISIT  07/24/2016     PHQ-2  01/01/2021     INFLUENZA VACCINE (1) 09/01/2021       Pt will discuss with PCP    Goals addressed this encounter:   Goals Addressed                    This Visit's Progress       Functional (pt-stated)         Goal Statement: I would like to see if I qualify for help at home (elderly waiver) through the [facility]    Date Goal set:   Barriers: may be over asset eligibility  Strengths: lives with family  Date to Achieve By: 3 months  Patient expressed understanding of goal: yes    Action steps to achieve this goal:  1. I will call the [facility] to discuss if I am over asset limitation for the program  2. I will complete assessment with the [facility] to determine what services I qualify for  3. I will request private pay caregiver resources in the future if needed               Intervention/Education provided during outreach:     [facility] - [phone]  [place] choices assessment for help at home     Outreach Frequency: monthly    Plan:  Status changed from maintenance to enrolled.     CHW outreach monthly.  CC SW available as needed and will monitor chart every 45 days.

## 2021-09-26 ENCOUNTER — HEALTH MAINTENANCE LETTER (OUTPATIENT)
Age: 86
End: 2021-09-26

## 2021-09-30 ENCOUNTER — PATIENT OUTREACH (OUTPATIENT)
Dept: CARE COORDINATION | Facility: CLINIC | Age: 86
End: 2021-09-30

## 2021-09-30 NOTE — PROGRESS NOTES
Clinic Care Coordination Contact  [facility]/Voicemail    Clinical Data: Care Coordinator Outreach  Outreach attempted x 1.  Left message on patient's voicemail with call back information and requested return call.  Plan: Care Coordinator will try to reach patient again in 10 business days.    Next CHW outreach date: 10/15/21

## 2021-10-11 ENCOUNTER — TRANSFERRED RECORDS (OUTPATIENT)
Dept: HEALTH INFORMATION MANAGEMENT | Facility: CLINIC | Age: 86
End: 2021-10-11
Payer: COMMERCIAL

## 2021-10-11 ENCOUNTER — NURSE TRIAGE (OUTPATIENT)
Dept: NURSING | Facility: CLINIC | Age: 86
End: 2021-10-11

## 2021-10-13 ENCOUNTER — PATIENT OUTREACH (OUTPATIENT)
Dept: CARE COORDINATION | Facility: CLINIC | Age: 86
End: 2021-10-13

## 2021-10-13 NOTE — PROGRESS NOTES
Clinic Care Coordination Contact    Situation: Patient chart reviewed by care coordinator.    Background: Pt was on maintenance but new goal created in August 2021 re: pt being assessed by [facility] for help at home services    Assessment: CHW was unable to reach pt at last outreach. Next outreach scheduled on or around 10/15/21    Plan/Recommendations: CHW outreach monthly.  CC SW available as needed and will monitor chart every 45 days.

## 2021-10-19 ENCOUNTER — PATIENT OUTREACH (OUTPATIENT)
Dept: NURSING | Facility: CLINIC | Age: 86
End: 2021-10-19

## 2021-10-19 NOTE — PROGRESS NOTES
Clinic Care Coordination Contact    Community Health Worker Follow Up    Care Gaps:     Health Maintenance Due   Topic Date Due     ZOSTER IMMUNIZATION (2 of 3) 02/26/2015     MEDICARE ANNUAL WELLNESS VISIT  07/24/2016     PHQ-2  01/01/2021     INFLUENZA VACCINE (1) 09/01/2021       Patient accepted scheduling phone number for [facility]  to schedule independently     Goals: All completed      CHW Plan: Patient has accomplished goals and has no other goals that this patient would like to work with Clinic Care Coordination/Health Care Home. Care Guide sent request to [facility] SW pool to review for Maintenance.

## 2021-10-25 ENCOUNTER — PATIENT OUTREACH (OUTPATIENT)
Dept: CARE COORDINATION | Facility: CLINIC | Age: 86
End: 2021-10-25

## 2021-10-25 NOTE — PROGRESS NOTES
Clinic Care Coordination Contact    Assessment:  Per chart review, CHW completed outreach to patient.  Patient has continued to follow the plan of care and assessment is negative for any new needs or concerns. Pt recently approved for [facility] for in-home services.     Enrollment status: Maintenance     Plan: CHW set outreach for 2 months.  If no new needs identified at next outreach send chart to CC to review for graduation.

## 2021-12-22 ENCOUNTER — PATIENT OUTREACH (OUTPATIENT)
Dept: CARE COORDINATION | Facility: CLINIC | Age: 86
End: 2021-12-22
Payer: COMMERCIAL

## 2021-12-22 NOTE — PROGRESS NOTES
Clinic Care Coordination Contact  [facility]/Voicemail    Clinical Data: Care Coordinator Outreach  Outreach attempted x 1.  Left message on patient's voicemail with call back information and requested return call.  Plan: Care Coordinator will try to reach patient again in 10 business days.    Patient on CCC Maintenance since 10/25/21    Next CHW outreach date: 1/6/21    [facility] Care Coordination   Office: [phone]

## 2022-01-07 ENCOUNTER — PATIENT OUTREACH (OUTPATIENT)
Dept: CARE COORDINATION | Facility: CLINIC | Age: 87
End: 2022-01-07
Payer: COMMERCIAL

## 2022-01-07 NOTE — LETTER
[facility] CARE COORDINATION  [address] [facility] [address]  January 10, 2022       Vanda Sanchez  [address]  [address]  [place] [address]    Dear Vanda,  Your Care Team congratulates you on your journey to maintain wellness. This document will help guide you on your journey to maintain a healthy lifestyle.  You can use this to help you overcome any barriers you may encounter.  If you should have any questions or concerns, you can contact the members of your Care Team or contact your Primary Care Clinic for assistance.     My Access Plan  Medical Emergency 911   Primary Clinic Line [facility] - [phone]   24 Hour Appointment Line [phone] or  [phone] ([phone]) (toll-free)   24 Hour Nurse Line [phone] (toll-free)   Preferred Urgent Care     Preferred Hospital     Preferred Pharmacy [facility] - [place], [place] - [address] [place] [address].     Behavioral Health Crisis Line The National Suicide Prevention Lifeline at 1-251.450.3382 or 911     My Care Team Members  Patient Care Team       Relationship Specialty Notifications Start End    Adonis Saunders MD PCP - General   1/17/13     Phone: [phone] Fax: [phone]         [address] [place] [address]    Adonis Saunders MD Assigned PCP   7/16/21     Phone: [phone] Fax: [phone]         [address] [place] [address]    , Arlene Ortiz Worker   1/10/22     Phone: [phone]                   Goals        COMPLETED: Functional (pt-stated)       I have accomplished getting approved for help at home (elderly waiver) through the [facility].     Personal Plan  If I have any questions regarding the Elderly waiver I will call my  with the [facility].             Advance Care Plans/Directives Type:      Thank you for providing us with your Advance Directive. We will keep this on file and recommend that it be updated every 2 years, if  your health situation changes, if there is a death of one of your health care agents, and/or if there are changes in your marital status.    It has been your Clinic Care Team's pleasure to work with you on your goals.    Regards,  Your Clinic Care Team

## 2022-01-10 ENCOUNTER — TELEPHONE (OUTPATIENT)
Dept: CARE COORDINATION | Facility: CLINIC | Age: 87
End: 2022-01-10
Payer: COMMERCIAL

## 2022-01-10 NOTE — PROGRESS NOTES
Clinic Care Coordination Contact    Assessment: Care Coordinator contacted patient for 2 month follow up.  Patient has continued to follow the plan of care and assessment is negative for any new needs or concerns.    Pt confirmed she had has  through [facility] and receives Boost, incontinence supplies, homemaking and PCA services through [facility].    Enrollment status: Graduated.      Plan: No further outreaches at this time.  Patient will continue to follow the plan of care.  If new needs arise a new Care Coordination referral may be placed.  FYI to PCP    * Pt stated she has had cold like symptoms for several week. CC SW advised her to call clinic and discuss symptoms with RN triage.

## 2022-01-10 NOTE — TELEPHONE ENCOUNTER
Dr. Saunders and team,    I spoke with Vanda Friday and she reported cold/flu-like symptoms for more than 2 weeks. I encouraged her to contact clinic RN triage to discuss symptoms in more detail and further assessment.  Can CMA reach out to her today to determine if she need to be seen for a virtual or in clinic visit?    Thank you!  Dolores  Social Work  Care Coordinator

## 2022-05-06 ENCOUNTER — TELEPHONE (OUTPATIENT)
Dept: INTERNAL MEDICINE | Facility: CLINIC | Age: 87
End: 2022-05-06
Payer: COMMERCIAL

## 2022-05-06 NOTE — TELEPHONE ENCOUNTER
Left detailed voicemail for patient to call back to get rescheduled.  Nakita Hector CMA ............... 4:05 PM, 05/06/22

## 2022-05-06 NOTE — TELEPHONE ENCOUNTER
Patient is scheduled for an establish care visit and physical exam with me next week.  I am not taking new patients.  Please contact patient to get an appointment to establish care with available provider.  I would not be able to see her to establish care.

## 2022-05-09 NOTE — TELEPHONE ENCOUNTER
Pt returned call. TC advised patient that Dr Arana is not seeing any new patients. TC advised she would not be able to se Dr Singh on 05/12/2022. PT understood. TC helped PT schedule with another provider.    Inez Valentino

## 2022-07-03 ENCOUNTER — HEALTH MAINTENANCE LETTER (OUTPATIENT)
Age: 87
End: 2022-07-03

## 2022-07-13 ENCOUNTER — OFFICE VISIT (OUTPATIENT)
Dept: INTERNAL MEDICINE | Facility: CLINIC | Age: 87
End: 2022-07-13
Payer: COMMERCIAL

## 2022-07-13 VITALS
DIASTOLIC BLOOD PRESSURE: 104 MMHG | OXYGEN SATURATION: 98 % | WEIGHT: 190 LBS | BODY MASS INDEX: 30.53 KG/M2 | SYSTOLIC BLOOD PRESSURE: 182 MMHG | HEIGHT: 66 IN | HEART RATE: 79 BPM

## 2022-07-13 DIAGNOSIS — H69.93 DYSFUNCTION OF BOTH EUSTACHIAN TUBES: ICD-10-CM

## 2022-07-13 DIAGNOSIS — E66.09 CLASS 1 OBESITY DUE TO EXCESS CALORIES WITHOUT SERIOUS COMORBIDITY WITH BODY MASS INDEX (BMI) OF 31.0 TO 31.9 IN ADULT: ICD-10-CM

## 2022-07-13 DIAGNOSIS — R09.81 NASAL CONGESTION: ICD-10-CM

## 2022-07-13 DIAGNOSIS — E66.811 CLASS 1 OBESITY DUE TO EXCESS CALORIES WITHOUT SERIOUS COMORBIDITY WITH BODY MASS INDEX (BMI) OF 31.0 TO 31.9 IN ADULT: ICD-10-CM

## 2022-07-13 DIAGNOSIS — R26.89 BALANCE PROBLEMS: ICD-10-CM

## 2022-07-13 DIAGNOSIS — F33.2 SEVERE EPISODE OF RECURRENT MAJOR DEPRESSIVE DISORDER, WITHOUT PSYCHOTIC FEATURES (H): ICD-10-CM

## 2022-07-13 DIAGNOSIS — I10 PRIMARY HYPERTENSION: ICD-10-CM

## 2022-07-13 DIAGNOSIS — R26.2 DIFFICULTY WALKING: ICD-10-CM

## 2022-07-13 DIAGNOSIS — Z76.89 ENCOUNTER TO ESTABLISH CARE: ICD-10-CM

## 2022-07-13 DIAGNOSIS — J30.2 SEASONAL ALLERGIC RHINITIS, UNSPECIFIED TRIGGER: ICD-10-CM

## 2022-07-13 DIAGNOSIS — Z00.00 MEDICARE ANNUAL WELLNESS VISIT, SUBSEQUENT: ICD-10-CM

## 2022-07-13 PROCEDURE — 99214 OFFICE O/P EST MOD 30 MIN: CPT | Mod: 25 | Performed by: NURSE PRACTITIONER

## 2022-07-13 PROCEDURE — 99397 PER PM REEVAL EST PAT 65+ YR: CPT | Performed by: NURSE PRACTITIONER

## 2022-07-13 RX ORDER — SERTRALINE HYDROCHLORIDE 25 MG/1
25 TABLET, FILM COATED ORAL DAILY
Qty: 60 TABLET | Refills: 0 | Status: SHIPPED | OUTPATIENT
Start: 2022-07-13 | End: 2022-10-03

## 2022-07-13 RX ORDER — FLUTICASONE PROPIONATE 50 MCG
1 SPRAY, SUSPENSION (ML) NASAL DAILY
Qty: 16 G | Refills: 1 | Status: SHIPPED | OUTPATIENT
Start: 2022-07-13 | End: 2023-11-15

## 2022-07-13 ASSESSMENT — ENCOUNTER SYMPTOMS
NERVOUS/ANXIOUS: 1
DIARRHEA: 0
DIZZINESS: 1
HEMATURIA: 0
HEADACHES: 1
EYE PAIN: 1
BREAST MASS: 0
CONSTIPATION: 0
CHILLS: 0
HEMATOCHEZIA: 0
ABDOMINAL PAIN: 0
COUGH: 0

## 2022-07-13 ASSESSMENT — ACTIVITIES OF DAILY LIVING (ADL)
CURRENT_FUNCTION: TRANSPORTATION REQUIRES ASSISTANCE
CURRENT_FUNCTION: BATHING REQUIRES ASSISTANCE
CURRENT_FUNCTION: MONEY MANAGEMENT REQUIRES ASSISTANCE

## 2022-07-13 ASSESSMENT — PATIENT HEALTH QUESTIONNAIRE - PHQ9
10. IF YOU CHECKED OFF ANY PROBLEMS, HOW DIFFICULT HAVE THESE PROBLEMS MADE IT FOR YOU TO DO YOUR WORK, TAKE CARE OF THINGS AT HOME, OR GET ALONG WITH OTHER PEOPLE: SOMEWHAT DIFFICULT
SUM OF ALL RESPONSES TO PHQ QUESTIONS 1-9: 17
SUM OF ALL RESPONSES TO PHQ QUESTIONS 1-9: 17

## 2022-07-13 NOTE — PATIENT INSTRUCTIONS
Take the sertraline at bedtime daily.    I have referred you to see ENT, [phone] to set up your visit.    Try the flonase nasal spray I ordered one spray each nostril daily to help with the congestion.    I will see you back in 6 weeks for follow up. Come fasted, an 8 hour fast is required.    Watch your home blood pressures, goals are under 140/90; greater than 100/60. Watch your salt intake, goals are less than 3 grams per day.    Physical therapy will call you to set up your first visit.     If having double, blurred vision, headache, chest pain, chest pressure, or shortness of breath, this is an ER visit.

## 2022-07-13 NOTE — PROGRESS NOTES
SUBJECTIVE:   Vanda Sanchez is a 86 year old female who presents for Preventive Visit.      Patient has been advised of split billing requirements and indicates understanding: Yes  Are you in the first 12 months of your Medicare coverage?  No    The patient presents today to establish care and for her medicare wellness visit.    She previously was seen years ago by Dr. Saunders.    She is not fasted today.    She reports that her blood pressure is always elevated. She had her parathyroid removed, and her blood pressures have been increased since this happened.    Discussed with her, that more likely her elevated blood pressure is related to anxiety and untreated depression.    She does report that her home blood pressures have also been high, running around 200/100. She is allergic to many blood pressure medications, and does not want to start one. She will monitor for any symptoms associated with elevated blood pressure, stroke, etc, and will present to the ER if they occur.    She reports that since her partner passed, two years ago in October, she has been very depressed. Her step daughter and her kids moved into her home with her, and she feels like she cannot do anything right. She does admit to feeling down and depressed. She would like to try some depression medication. She is not interested in therapy at this time. She is not homicidal or suicidal.    Her balance has been poor, she would like to do some physical therapy for this.    She repots that her Mom  at age 97, she is not sure what of. Her father passed at age 85 of heart issues. Brother had lung cancer.    She is not exercising.    She reports that she got hearing aids last year, but continues to have issues with her hearing.    She has not had labs done since 2019.     Healthy Habits:     In general, how would you rate your overall health?  Good    Frequency of exercise:  None    Do you usually eat at least 4 servings of fruit and vegetables  "a day, include whole grains    & fiber and avoid regularly eating high fat or \"junk\" foods?  No    Taking medications regularly:  No    Barriers to taking medications:  Side effects    Medication side effects:  Other    Ability to successfully perform activities of daily living:  Transportation requires assistance, bathing requires assistance and money management requires assistance    Home Safety:  No safety concerns identified    Hearing Impairment:  Difficulty following a conversation in a noisy restaurant or crowded room, feel that people are mumbling or not speaking clearly, difficulty following dialogue in the theater, difficult to understand a speaker at a public meeting or Buddhism service, need to ask people to speak up or repeat themselves, difficulty understanding soft or whispered speech and difficulty understanding speech on the telephone    In the past 6 months, have you been bothered by leaking of urine? Yes    In general, how would you rate your overall mental or emotional health?  Good      PHQ-2 Total Score: 2    Additional concerns today:  No    Do you feel safe in your environment? Yes    Have you ever done Advance Care Planning? (For example, a Health Directive, POLST, or a discussion with a medical provider or your loved ones about your wishes): Yes, patient states has an Advance Care Planning document and will bring a copy to the clinic.       Fall risk  Fallen 2 or more times in the past year?: No  Any fall with injury in the past year?: No    Cognitive Screening   1) Repeat 3 items (Leader, Season, Table)    2) Clock draw: NORMAL  3) 3 item recall: Recalls 2 objects   Results: NORMAL clock, 1-2 items recalled: COGNITIVE IMPAIRMENT LESS LIKELY    Mini-CogTM Copyright TOSHA Shoemaker. Licensed by the author for use in [facility]; reprinted with permission (joseph@.[facility]). All rights reserved.      Do you have sleep apnea, excessive snoring or daytime drowsiness?: no    Reviewed and " updated as needed this visit by clinical staff   Tobacco  Allergies  Meds                Reviewed and updated as needed this visit by Provider                   Social History     Tobacco Use     Smoking status: Never Smoker     Smokeless tobacco: Never Used   Substance Use Topics     Alcohol use: No         Alcohol Use 7/13/2022   Prescreen: >3 drinks/day or >7 drinks/week? Not Applicable     Current providers sharing in care for this patient include:   Patient Care Team:  Elissa Meadows CNP as PCP - General (Nurse Practitioner - Gerontology)  Adonis Saunders MD as Assigned PCP  , Arlene as County Worker    The following health maintenance items are reviewed in Epic and correct as of today:  Health Maintenance Due   Topic Date Due     DEPRESSION ACTION PLAN  Never done     ZOSTER IMMUNIZATION (2 of 3) 02/26/2015     COVID-19 Vaccine (4 - Booster for Pfizer series) 01/27/2022     Future fasted labs    Mammogram Screening: Mammogram Screening - Mammography discussed and declined    Review of Systems   Constitutional: Negative for chills.   HENT: Positive for congestion, ear pain and hearing loss.    Eyes: Positive for pain.   Respiratory: Negative for cough.    Cardiovascular: Negative for chest pain.   Gastrointestinal: Negative for abdominal pain, constipation, diarrhea and hematochezia.   Breasts:  Negative for tenderness, breast mass and discharge.   Genitourinary: Positive for urgency. Negative for hematuria, pelvic pain, vaginal bleeding and vaginal discharge.   Neurological: Positive for dizziness and headaches.   Psychiatric/Behavioral: Positive for mood changes. The patient is nervous/anxious.      Constitutional, HEENT, cardiovascular, pulmonary, GI, , musculoskeletal, neuro, skin, endocrine and psych systems are negative, except as otherwise noted.    OBJECTIVE:   BP (!) 182/104 (BP Location: Right arm, Patient Position: Sitting, Cuff Size: Adult Large)   Pulse 79   Ht 1.664 m (5'  "5.5\")   Wt 86.2 kg (190 lb)   SpO2 98%   BMI 31.14 kg/m   Estimated body mass index is 31.14 kg/m  as calculated from the following:    Height as of this encounter: 1.664 m (5' 5.5\").    Weight as of this encounter: 86.2 kg (190 lb).  Physical Exam  GENERAL: healthy, alert and no distress  EYES: Eyes grossly normal to inspection, PERRL and conjunctivae and sclerae normal  HENT: ear canals and TM's normal, nose and mouth without ulcers or lesions  NECK: no adenopathy, no asymmetry, masses, or scars and thyroid normal to palpation  RESP: lungs clear to auscultation - no rales, rhonchi or wheezes  CV: regular rate and rhythm, normal S1 S2, no S3 or S4, no murmur, click or rub, no peripheral edema and peripheral pulses strong  ABDOMEN: soft, nontender, no hepatosplenomegaly, no masses and bowel sounds normal  MS: no gross musculoskeletal defects noted, no edema  SKIN: no suspicious lesions or rashes  NEURO: Normal strength and tone, mentation intact and speech normal  PSYCH: mentation appears normal, affect normal/bright      Labs reviewed in Epic    ASSESSMENT / PLAN:   Vanda was seen today for physical and establish care.    Diagnoses and all orders for this visit:    Medicare annual wellness visit, subsequent: Completed today.     Encounter to establish care: Completed today.     Severe episode of recurrent major depressive disorder, without psychotic features (H): Since her partner passed away two years ago. PHQ-9 score today was a 17. Will start on sertraline. She is not homicidal or suicidal, does not want to start therapy. 6 week follow up.   -     sertraline (ZOLOFT) 25 MG tablet; Take 1 tablet (25 mg) by mouth daily    Dysfunction of both eustachian tubes/Nasal congestion: Chronic nasal congestion, ear congestion, has hearing aids. Will try flonase, ENT referral placed.   -     Adult ENT  Referral; Future  -     fluticasone (FLONASE) 50 MCG/ACT nasal spray; Spray 1 spray into both nostrils " "daily    Primary Hypertension: Blood pressure today was 182/104. I suspect this is related to anxiety/depression. She has not tolerated any blood pressure medications in the past. She is monitoring her blood pressure at home, does not want to start medications. Discussed risk of stroke and heart attack.     Balance problems/Difficulty walking: Continues. PT referral placed.   -     Physical Therapy Referral; Future    Seasonal allergic rhinitis, unspecified trigger: Continue Claritin, ordered Flonase.     Class 1 obesity due to excess calories without serious comorbidity with body mass index (BMI) of 31.0 to 31.9 in adult: BMI today was 31.14. Discussed diet and exercise.     Other orders  -     REVIEW OF HEALTH MAINTENANCE PROTOCOL ORDERS    COUNSELING:  Reviewed preventive health counseling, as reflected in patient instructions  Special attention given to:       Regular exercise       Healthy diet/nutrition       Vision screening    Estimated body mass index is 31.14 kg/m  as calculated from the following:    Height as of this encounter: 1.664 m (5' 5.5\").    Weight as of this encounter: 86.2 kg (190 lb).    Weight management plan: Discussed healthy diet and exercise guidelines    She reports that she has never smoked. She has never used smokeless tobacco.      Appropriate preventive services were discussed with this patient, including applicable screening as appropriate for cardiovascular disease, diabetes, osteopenia/osteoporosis, and glaucoma.  As appropriate for age/gender, discussed screening for colorectal cancer, prostate cancer, breast cancer, and cervical cancer. Checklist reviewing preventive services available has been given to the patient.    Reviewed patients plan of care and provided an AVS. The Intermediate Care Plan ( asthma action plan, low back pain action plan, and migraine action plan) for Vanda meets the Care Plan requirement. This Care Plan has been established and reviewed with the " Patient.    Counseling Resources:  ATP IV Guidelines  Pooled Cohorts Equation Calculator  Breast Cancer Risk Calculator  Breast Cancer: Medication to Reduce Risk  FRAX Risk Assessment  ICSI Preventive Guidelines  Dietary Guidelines for Americans, 2010  USDA's MyPlate  ASA Prophylaxis  Lung CA Screening    Elissa Meadows CNP  [facility]    Identified Health Risks:  Answers for HPI/ROS submitted by the patient on 7/13/2022  If you checked off any problems, how difficult have these problems made it for you to do your work, take care of things at home, or get along with other people?: Somewhat difficult  PHQ9 TOTAL SCORE: 17

## 2022-07-19 NOTE — PROGRESS NOTES
History of Present Illness - Vanda Sanchez is a very pleasant 87 year old female here to see me for the first time due to hearing loss.    She tells me that this all started in [facility] with a severe URI or allergy.  She started to get severe congestion in the nose, as well as her RIGHT ear.  She has known allergic rhinitis in the spring, so she waitied this out, but her ear on the RIGHT has stayed stuffy.  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    Previous audiology testing was done on 4/7/2021 at [place].  It was personally reviewed for today's exam and consult.  It showed a fairly flat moderate to severe sensorineural hearing loss in both ears.  No conductive hearing loss, tympanograms were normal.  Word recognition was normal on the LEFT, but down to 76% on the RIGHT    A new audiogram was done today.  The nerve line was stable on the LEFT, but the RIGHT side has had a threshold shift, and it appears to be a mixed loss. Tympanogram was also a flat curve on the RIGHT as well.    Otherwise no history of chronic ear disease.  No previous ear surgery.  No history of chemo or radiation therapy to the head and neck, and no major head trauma.  He denies a history of  service, no frequent firearm use.  No previous history working in an industrial environment.      Past Medical History -   Patient Active Problem List   Diagnosis     HTN (hypertension)     DJD (degenerative joint disease)     Osteopenia     Dyslipidemia     Vitamin D deficiency     Pre-diabetes     Allergic rhinitis     S/P hip replacement     Obesity     CARDIOVASCULAR SCREENING; LDL GOAL LESS THAN 100       Current Medications -   Current Outpatient Medications:      Ascorbic Acid (VITAMIN C) 100 MG CHEW, Take  by mouth., Disp: , Rfl:      DiphenhydrAMINE HCl (BENADRYL ALLERGY PO), Take  by mouth., Disp: , Rfl:      fluticasone  (FLONASE) 50 MCG/ACT nasal spray, Spray 1 spray into both nostrils daily, Disp: 16 g, Rfl: 1     loratadine (CLARITIN) 10 MG tablet, Take 10 mg by mouth daily Only takes occasionally due to it increasing BP, Disp: , Rfl:      Magnesium 400 MG CAPS, Take 400 mg by mouth as needed, Disp: , Rfl:      sertraline (ZOLOFT) 25 MG tablet, Take 1 tablet (25 mg) by mouth daily, Disp: 60 tablet, Rfl: 0    Allergies -   Allergies   Allergen Reactions     Atenolol Fatigue     Carvedilol Cramps     Clonidine Nausea and Vomiting     Cozaar Swelling     Enalapril      Hctz      She thinks it caused joint arthritis     Lisinopril      Wheat      Amlodipine Besylate Rash       Social History -   Social History     Socioeconomic History     Marital status: Single   Tobacco Use     Smoking status: Never Smoker     Smokeless tobacco: Never Used   Substance and Sexual Activity     Alcohol use: No     Drug use: No     Sexual activity: Never   Other Topics Concern     Parent/sibling w/ CABG, MI or angioplasty before 65F 55M? No     Social Determinants of Health     Financial Resource Strain: Low Risk      Difficulty of Paying Living Expenses: Not very hard   Food Insecurity: No Food Insecurity     Worried About Running Out of Food in the Last Year: Never true     Ran Out of Food in the Last Year: Never true   Transportation Needs: No Transportation Needs     Lack of Transportation (Medical): No     Lack of Transportation (Non-Medical): No   Social Connections: Unknown     Frequency of Communication with Friends and Family: More than three times a week     Frequency of Social Gatherings with Friends and Family: More than three times a week       Family History -   Family History   Problem Relation Age of Onset     Heart Disease Mother      Lipids Brother      Cancer Mother      Diabetes Mother      Cancer Father      Heart Disease Father      Cancer Sister      Hypertension Sister      Cancer Brother      Heart Disease Brother      Cancer  Maternal Grandmother        Review of Systems - As per HPI and PMHx, otherwise 10+ system review of the head and neck, and general constitution is negative.    Physical Exam  There were no vitals taken for this visit.    General - The patient is well nourished and well developed, and appears to have good nutritional status.  Alert and oriented to person and place, answers questions and cooperates with examination appropriately.   Head and Face - Normocephalic and atraumatic, with no gross asymmetry noted of the contour of the facial features.  The facial nerve is intact, with strong symmetric movements.  Voice and Breathing - The patient was breathing comfortably without the use of accessory muscles. There was no wheezing, stridor, or stertor.  The patients voice was clear and strong, and had appropriate pitch and quality.  Ears - The LEFT tympanic membrane and canal were normal. The RIGHT tympanic membrane was retracted with an obvious dark yellow effusion.  Eyes - Extraocular movements intact, and the pupils were reactive to light.  Sclera were not icteric or injected, conjunctiva were pink and moist.  Mouth - Examination of the oral cavity showed pink, healthy oral mucosa. No lesions or ulcerations noted.  The tongue was mobile and midline, and the dentition were in good condition.    Throat - The walls of the oropharynx were smooth, pink, moist, symmetric, and had no lesions or ulcerations.  The tonsillar pillars and soft palate were symmetric.  The uvula was midline on elevation.    Neck - Normal midline excursion of the laryngotracheal complex during swallowing.  Full range of motion on passive movement.  Palpation of the occipital, submental, submandibular, internal jugular chain, and supraclavicular nodes did not demonstrate any abnormal lymph nodes or masses.  The carotid pulse was palpable bilaterally.  Palpation of the thyroid was soft and smooth, with no nodules or goiter appreciated.  The trachea was  mobile and midline.  Nose - External contour is symmetric, no gross deflection or scars.  Nasal mucosa is pink and moist with no abnormal mucus.  The septum was midline and non-obstructive, turbinates of normal size and position.  No polyps, masses, or purulence noted on examination.    Audiologic Studies - see above    Procedure - RIGHT  Myringotomy without tube    Procedure - After discussion of the risks and benefits of myringotomy, informed consent was signed and placed in the chart.  I began with the RIGHT side.  I proceeded to position the patient in a semi-supine position in the examination chair.  Using the binocular surgical microscope, I then proceeded to clean the canal of cerumen and squamous debris.  I was able to see the tympanic membrane.  Using a small cotton tipped applicator, I applied a tiny coating of phenol onto the tympanic membrane.  After visualizing a good pool, I then proceeded to use a myringotomy knife to make a radially oriented incision in the tympanic membrane.  A moderate amount of clear yellow effusion was suctioned away.        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    I have performed RIGHT myringotomy without tube and that has immediately relieved the pressure.  I have counseled her on what to expect in terms of a bit of residual drainage from that RIGHT side.  Follow-up in 2 months to make sure the tympanic membrane has healed and to continue to follow her chronic rhinitis from chemical sensitivity.

## 2022-07-25 ENCOUNTER — OFFICE VISIT (OUTPATIENT)
Dept: OTOLARYNGOLOGY | Facility: CLINIC | Age: 87
End: 2022-07-25
Payer: COMMERCIAL

## 2022-07-25 ENCOUNTER — OFFICE VISIT (OUTPATIENT)
Dept: AUDIOLOGY | Facility: CLINIC | Age: 87
End: 2022-07-25
Payer: COMMERCIAL

## 2022-07-25 DIAGNOSIS — H90.A31 MIXED CONDUCTIVE AND SENSORINEURAL HEARING LOSS OF RIGHT EAR WITH RESTRICTED HEARING OF LEFT EAR: ICD-10-CM

## 2022-07-25 DIAGNOSIS — T78.40XA MULTIPLE CHEMICAL SENSITIVITY SYNDROME, INITIAL ENCOUNTER: ICD-10-CM

## 2022-07-25 DIAGNOSIS — H65.21 RIGHT CHRONIC SEROUS OTITIS MEDIA: Primary | ICD-10-CM

## 2022-07-25 DIAGNOSIS — H90.A22 SENSORINEURAL HEARING LOSS (SNHL) OF LEFT EAR WITH RESTRICTED HEARING OF RIGHT EAR: Primary | ICD-10-CM

## 2022-07-25 DIAGNOSIS — R09.81 NASAL CONGESTION: ICD-10-CM

## 2022-07-25 DIAGNOSIS — H69.93 DYSFUNCTION OF BOTH EUSTACHIAN TUBES: ICD-10-CM

## 2022-07-25 PROCEDURE — 69420 INCISION OF EARDRUM: CPT | Mod: RT | Performed by: OTOLARYNGOLOGY

## 2022-07-25 PROCEDURE — 99203 OFFICE O/P NEW LOW 30 MIN: CPT | Mod: 25 | Performed by: OTOLARYNGOLOGY

## 2022-07-25 PROCEDURE — 92557 COMPREHENSIVE HEARING TEST: CPT

## 2022-07-25 PROCEDURE — 92550 TYMPANOMETRY & REFLEX THRESH: CPT

## 2022-07-25 NOTE — LETTER
7/25/2022         RE: Vanda Sanchez  [address]   [address]  [facility] [address]        Dear Colleague,    Thank you for referring your patient, Vanda Sanchez, to the [facility]. Please see a copy of my visit note below.    History of Present Illness - Vanda Sanchez is a very pleasant 87 year old female here to see me for the first time due to hearing loss.    She tells me that this all started in [facility] with a severe URI or allergy.  She started to get severe congestion in the nose, as well as her RIGHT ear.  She has known allergic rhinitis in the spring, so she waitied this out, but her ear on the RIGHT has stayed stuffy.  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    Previous audiology testing was done on 4/7/2021 at [place].  It was personally reviewed for today's exam and consult.  It showed a fairly flat moderate to severe sensorineural hearing loss in both ears.  No conductive hearing loss, tympanograms were normal.  Word recognition was normal on the LEFT, but down to 76% on the RIGHT    A new audiogram was done today.  The nerve line was stable on the LEFT, but the RIGHT side has had a threshold shift, and it appears to be a mixed loss. Tympanogram was also a flat curve on the RIGHT as well.    Otherwise no history of chronic ear disease.  No previous ear surgery.  No history of chemo or radiation therapy to the head and neck, and no major head trauma.  He denies a history of  service, no frequent firearm use.  No previous history working in an industrial environment.      Past Medical History -   Patient Active Problem List   Diagnosis     HTN (hypertension)     DJD (degenerative joint disease)     Osteopenia     Dyslipidemia     Vitamin D deficiency     Pre-diabetes     Allergic rhinitis     S/P hip replacement     Obesity     CARDIOVASCULAR  SCREENING; LDL GOAL LESS THAN 100       Current Medications -   Current Outpatient Medications:      Ascorbic Acid (VITAMIN C) 100 MG CHEW, Take  by mouth., Disp: , Rfl:      DiphenhydrAMINE HCl (BENADRYL ALLERGY PO), Take  by mouth., Disp: , Rfl:      fluticasone (FLONASE) 50 MCG/ACT nasal spray, Spray 1 spray into both nostrils daily, Disp: 16 g, Rfl: 1     loratadine (CLARITIN) 10 MG tablet, Take 10 mg by mouth daily Only takes occasionally due to it increasing BP, Disp: , Rfl:      Magnesium 400 MG CAPS, Take 400 mg by mouth as needed, Disp: , Rfl:      sertraline (ZOLOFT) 25 MG tablet, Take 1 tablet (25 mg) by mouth daily, Disp: 60 tablet, Rfl: 0    Allergies -   Allergies   Allergen Reactions     Atenolol Fatigue     Carvedilol Cramps     Clonidine Nausea and Vomiting     Cozaar Swelling     Enalapril      Hctz      She thinks it caused joint arthritis     Lisinopril      Wheat      Amlodipine Besylate Rash       Social History -   Social History     Socioeconomic History     Marital status: Single   Tobacco Use     Smoking status: Never Smoker     Smokeless tobacco: Never Used   Substance and Sexual Activity     Alcohol use: No     Drug use: No     Sexual activity: Never   Other Topics Concern     Parent/sibling w/ CABG, MI or angioplasty before 65F 55M? No     Social Determinants of Health     Financial Resource Strain: Low Risk      Difficulty of Paying Living Expenses: Not very hard   Food Insecurity: No Food Insecurity     Worried About Running Out of Food in the Last Year: Never true     Ran Out of Food in the Last Year: Never true   Transportation Needs: No Transportation Needs     Lack of Transportation (Medical): No     Lack of Transportation (Non-Medical): No   Social Connections: Unknown     Frequency of Communication with Friends and Family: More than three times a week     Frequency of Social Gatherings with Friends and Family: More than three times a week       Family History -   Family History    Problem Relation Age of Onset     Heart Disease Mother      Lipids Brother      Cancer Mother      Diabetes Mother      Cancer Father      Heart Disease Father      Cancer Sister      Hypertension Sister      Cancer Brother      Heart Disease Brother      Cancer Maternal Grandmother        Review of Systems - As per HPI and PMHx, otherwise 10+ system review of the head and neck, and general constitution is negative.    Physical Exam  There were no vitals taken for this visit.    General - The patient is well nourished and well developed, and appears to have good nutritional status.  Alert and oriented to person and place, answers questions and cooperates with examination appropriately.   Head and Face - Normocephalic and atraumatic, with no gross asymmetry noted of the contour of the facial features.  The facial nerve is intact, with strong symmetric movements.  Voice and Breathing - The patient was breathing comfortably without the use of accessory muscles. There was no wheezing, stridor, or stertor.  The patients voice was clear and strong, and had appropriate pitch and quality.  Ears - The LEFT tympanic membrane and canal were normal. The RIGHT tympanic membrane was retracted with an obvious dark yellow effusion.  Eyes - Extraocular movements intact, and the pupils were reactive to light.  Sclera were not icteric or injected, conjunctiva were pink and moist.  Mouth - Examination of the oral cavity showed pink, healthy oral mucosa. No lesions or ulcerations noted.  The tongue was mobile and midline, and the dentition were in good condition.    Throat - The walls of the oropharynx were smooth, pink, moist, symmetric, and had no lesions or ulcerations.  The tonsillar pillars and soft palate were symmetric.  The uvula was midline on elevation.    Neck - Normal midline excursion of the laryngotracheal complex during swallowing.  Full range of motion on passive movement.  Palpation of the occipital, submental,  submandibular, internal jugular chain, and supraclavicular nodes did not demonstrate any abnormal lymph nodes or masses.  The carotid pulse was palpable bilaterally.  Palpation of the thyroid was soft and smooth, with no nodules or goiter appreciated.  The trachea was mobile and midline.  Nose - External contour is symmetric, no gross deflection or scars.  Nasal mucosa is pink and moist with no abnormal mucus.  The septum was midline and non-obstructive, turbinates of normal size and position.  No polyps, masses, or purulence noted on examination.    Audiologic Studies - see above    Procedure - RIGHT  Myringotomy without tube    Procedure - After discussion of the risks and benefits of myringotomy, informed consent was signed and placed in the chart.  I began with the RIGHT side.  I proceeded to position the patient in a semi-supine position in the examination chair.  Using the binocular surgical microscope, I then proceeded to clean the canal of cerumen and squamous debris.  I was able to see the tympanic membrane.  Using a small cotton tipped applicator, I applied a tiny coating of phenol onto the tympanic membrane.  After visualizing a good pool, I then proceeded to use a myringotomy knife to make a radially oriented incision in the tympanic membrane.  A moderate amount of clear yellow effusion was suctioned away.        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    I have performed RIGHT myringotomy without tube and that has immediately relieved the pressure.  I  have counseled her on what to expect in terms of a bit of residual drainage from that RIGHT side.  Follow-up in 2 months to make sure the tympanic membrane has healed and to continue to follow her chronic rhinitis from chemical sensitivity.        Again, thank you for allowing me to participate in the care of your patient.        Sincerely,        Hesham Mullen MD

## 2022-07-25 NOTE — PROGRESS NOTES
AUDIOLOGY REPORT:    Patient was referred to [facility] from ENT by Dr. Mullen for a hearing examination. Patient reports recent sinus and head cold that caused aural fullness in both ears. She feels that her hearing has gotten worse in both ears, but more in the right. She denies tinnitus, but does note occasional 'popping' in both ears. Vanda also reports longstanding dizziness. She currently wears hearing aids, however, she feels they are not working well at this time.     Testing:    Otoscopy:   Otoscopic exam indicates ears are clear of cerumen bilaterally     Tympanograms:    RIGHT: restricted eardrum mobility      LEFT:   normal eardrum mobility    Reflexes (reported by stimulus ear): 1000 Hz  RIGHT: Ipsilateral is present at normal levels  RIGHT: Contralateral is absent at frequencies tested  LEFT:   Ipsilateral is absent at frequencies tested  LEFT:   Contralateral is absent at frequencies tested    Thresholds:   Pure Tone Thresholds assessed using conventional audiometry with good  reliability from 250-8000 Hz bilaterally using insert earphones and circumaural headphones     RIGHT:  severe sloping to profound mixed hearing loss    LEFT:    moderate sloping to severe sensorineural hearing loss   Note: Left masked bone conduction could not be tested due to limits of the audiometer.     Speech Reception Threshold:    RIGHT: 70 dB HL    LEFT:   60 dB HL  Speech Reception Thresholds are in good agreement with pure tone thresholds    Word Recognition Score:     RIGHT: 60% at 90 dB HL using NU-6 recorded word list.    LEFT:   88% at 90 dB HL using NU-6 recorded word list.    When compared to previous audiogram on 4/7/2021, hearing has remained stable in the left ear and has declined at all frequenices in the right. Discussed results with the patient. It is recommended patient follow-up with her audiologist after medical management for possible hearing aid adjustments.     Patient was returned to  ENT for follow up.     Isaias Ramirez, CCC-A  Licensed Audiologist  [place] #[id number]    07/25/22

## 2022-08-22 ASSESSMENT — ANXIETY QUESTIONNAIRES
5. BEING SO RESTLESS THAT IT IS HARD TO SIT STILL: NOT AT ALL
1. FEELING NERVOUS, ANXIOUS, OR ON EDGE: MORE THAN HALF THE DAYS
GAD7 TOTAL SCORE: 10
2. NOT BEING ABLE TO STOP OR CONTROL WORRYING: MORE THAN HALF THE DAYS
4. TROUBLE RELAXING: SEVERAL DAYS
7. FEELING AFRAID AS IF SOMETHING AWFUL MIGHT HAPPEN: NEARLY EVERY DAY
3. WORRYING TOO MUCH ABOUT DIFFERENT THINGS: SEVERAL DAYS
6. BECOMING EASILY ANNOYED OR IRRITABLE: SEVERAL DAYS
IF YOU CHECKED OFF ANY PROBLEMS ON THIS QUESTIONNAIRE, HOW DIFFICULT HAVE THESE PROBLEMS MADE IT FOR YOU TO DO YOUR WORK, TAKE CARE OF THINGS AT HOME, OR GET ALONG WITH OTHER PEOPLE: SOMEWHAT DIFFICULT

## 2022-08-22 ASSESSMENT — PATIENT HEALTH QUESTIONNAIRE - PHQ9: SUM OF ALL RESPONSES TO PHQ QUESTIONS 1-9: 8

## 2022-10-03 ENCOUNTER — OFFICE VISIT (OUTPATIENT)
Dept: INTERNAL MEDICINE | Facility: CLINIC | Age: 87
End: 2022-10-03
Payer: COMMERCIAL

## 2022-10-03 VITALS
HEIGHT: 66 IN | DIASTOLIC BLOOD PRESSURE: 122 MMHG | HEART RATE: 84 BPM | SYSTOLIC BLOOD PRESSURE: 190 MMHG | BODY MASS INDEX: 30.37 KG/M2 | WEIGHT: 189 LBS | OXYGEN SATURATION: 100 %

## 2022-10-03 DIAGNOSIS — F41.9 ANXIETY: ICD-10-CM

## 2022-10-03 DIAGNOSIS — Z23 HIGH PRIORITY FOR 2019-NCOV VACCINE: ICD-10-CM

## 2022-10-03 DIAGNOSIS — R30.0 DYSURIA: ICD-10-CM

## 2022-10-03 DIAGNOSIS — F33.2 SEVERE EPISODE OF RECURRENT MAJOR DEPRESSIVE DISORDER, WITHOUT PSYCHOTIC FEATURES (H): ICD-10-CM

## 2022-10-03 DIAGNOSIS — I10 BENIGN ESSENTIAL HYPERTENSION: ICD-10-CM

## 2022-10-03 LAB
ALBUMIN UR-MCNC: NEGATIVE MG/DL
ANION GAP SERPL CALCULATED.3IONS-SCNC: 9 MMOL/L (ref 7–15)
APPEARANCE UR: CLEAR
BILIRUB UR QL STRIP: NEGATIVE
BUN SERPL-MCNC: 20 MG/DL (ref 8–23)
CALCIUM SERPL-MCNC: 9.6 MG/DL (ref 8.8–10.2)
CHLORIDE SERPL-SCNC: 101 MMOL/L (ref 98–107)
COLOR UR AUTO: YELLOW
CREAT SERPL-MCNC: 0.98 MG/DL (ref 0.51–0.95)
DEPRECATED HCO3 PLAS-SCNC: 26 MMOL/L (ref 22–29)
GFR SERPL CREATININE-BSD FRML MDRD: 56 ML/MIN/1.73M2
GLUCOSE SERPL-MCNC: 114 MG/DL (ref 70–99)
GLUCOSE UR STRIP-MCNC: NEGATIVE MG/DL
HGB UR QL STRIP: NEGATIVE
KETONES UR STRIP-MCNC: NEGATIVE MG/DL
LEUKOCYTE ESTERASE UR QL STRIP: NEGATIVE
NITRATE UR QL: NEGATIVE
PH UR STRIP: 7 [PH] (ref 5–8)
POTASSIUM SERPL-SCNC: 4 MMOL/L (ref 3.4–5.3)
SODIUM SERPL-SCNC: 136 MMOL/L (ref 136–145)
SP GR UR STRIP: 1.01 (ref 1–1.03)
UROBILINOGEN UR STRIP-ACNC: 0.2 E.U./DL

## 2022-10-03 PROCEDURE — 90662 IIV NO PRSV INCREASED AG IM: CPT | Performed by: NURSE PRACTITIONER

## 2022-10-03 PROCEDURE — 91312 COVID-19,PF,PFIZER BOOSTER BIVALENT: CPT | Performed by: NURSE PRACTITIONER

## 2022-10-03 PROCEDURE — G0008 ADMIN INFLUENZA VIRUS VAC: HCPCS | Performed by: NURSE PRACTITIONER

## 2022-10-03 PROCEDURE — 99214 OFFICE O/P EST MOD 30 MIN: CPT | Mod: 25 | Performed by: NURSE PRACTITIONER

## 2022-10-03 PROCEDURE — 81003 URINALYSIS AUTO W/O SCOPE: CPT | Performed by: NURSE PRACTITIONER

## 2022-10-03 PROCEDURE — 96127 BRIEF EMOTIONAL/BEHAV ASSMT: CPT | Mod: 59 | Performed by: NURSE PRACTITIONER

## 2022-10-03 PROCEDURE — 80048 BASIC METABOLIC PNL TOTAL CA: CPT | Performed by: NURSE PRACTITIONER

## 2022-10-03 PROCEDURE — 0124A COVID-19,PF,PFIZER BOOSTER BIVALENT: CPT | Performed by: NURSE PRACTITIONER

## 2022-10-03 PROCEDURE — 36415 COLL VENOUS BLD VENIPUNCTURE: CPT | Performed by: NURSE PRACTITIONER

## 2022-10-03 RX ORDER — HYDRALAZINE HYDROCHLORIDE 10 MG/1
10 TABLET, FILM COATED ORAL 3 TIMES DAILY
Qty: 180 TABLET | Refills: 3 | Status: SHIPPED | OUTPATIENT
Start: 2022-10-03 | End: 2023-06-28

## 2022-10-03 RX ORDER — SERTRALINE HYDROCHLORIDE 25 MG/1
25 TABLET, FILM COATED ORAL DAILY
Qty: 90 TABLET | Refills: 3 | Status: SHIPPED | OUTPATIENT
Start: 2022-10-03 | End: 2023-06-28

## 2022-10-03 ASSESSMENT — ANXIETY QUESTIONNAIRES
1. FEELING NERVOUS, ANXIOUS, OR ON EDGE: MORE THAN HALF THE DAYS
GAD7 TOTAL SCORE: 10
GAD7 TOTAL SCORE: 10
7. FEELING AFRAID AS IF SOMETHING AWFUL MIGHT HAPPEN: NEARLY EVERY DAY
IF YOU CHECKED OFF ANY PROBLEMS ON THIS QUESTIONNAIRE, HOW DIFFICULT HAVE THESE PROBLEMS MADE IT FOR YOU TO DO YOUR WORK, TAKE CARE OF THINGS AT HOME, OR GET ALONG WITH OTHER PEOPLE: SOMEWHAT DIFFICULT
8. IF YOU CHECKED OFF ANY PROBLEMS, HOW DIFFICULT HAVE THESE MADE IT FOR YOU TO DO YOUR WORK, TAKE CARE OF THINGS AT HOME, OR GET ALONG WITH OTHER PEOPLE?: SOMEWHAT DIFFICULT
5. BEING SO RESTLESS THAT IT IS HARD TO SIT STILL: NOT AT ALL
2. NOT BEING ABLE TO STOP OR CONTROL WORRYING: MORE THAN HALF THE DAYS
6. BECOMING EASILY ANNOYED OR IRRITABLE: SEVERAL DAYS
3. WORRYING TOO MUCH ABOUT DIFFERENT THINGS: SEVERAL DAYS
4. TROUBLE RELAXING: SEVERAL DAYS
7. FEELING AFRAID AS IF SOMETHING AWFUL MIGHT HAPPEN: NEARLY EVERY DAY
GAD7 TOTAL SCORE: 10

## 2022-10-03 ASSESSMENT — PATIENT HEALTH QUESTIONNAIRE - PHQ9
SUM OF ALL RESPONSES TO PHQ QUESTIONS 1-9: 3
10. IF YOU CHECKED OFF ANY PROBLEMS, HOW DIFFICULT HAVE THESE PROBLEMS MADE IT FOR YOU TO DO YOUR WORK, TAKE CARE OF THINGS AT HOME, OR GET ALONG WITH OTHER PEOPLE: SOMEWHAT DIFFICULT
SUM OF ALL RESPONSES TO PHQ QUESTIONS 1-9: 3

## 2022-10-03 NOTE — PATIENT INSTRUCTIONS
Twice daily blood pressures one hour after your hydralazine medication. Record readings. Goals are less than 150/90. If running over this, or below 100/60 please update me.    Start the hydralazine 10mg three times per day, breakfast, 2pm, and bedtime for your blood pressure.    Restart the sertraline.    You received your bivalent COVID booster and flu shot today.    Follow up in 6 weeks for a recheck, before then if anything comes up.

## 2022-10-03 NOTE — PROGRESS NOTES
Assessment & Plan     Benign essential hypertension: Blood pressure today was quite elevated at 200/140; recheck of 190/122. She has multiple allergies to blood pressure medications. Will try Hydralazine three times daily. Twice daily blood pressures, goals are less than 150/90 with her age per JNC-8. Follow up in 7 weeks. Will check kidney function today.   - hydrALAZINE (APRESOLINE) 10 MG tablet  Dispense: 180 tablet; Refill: 3  - Basic metabolic panel  (Ca, Cl, CO2, Creat, Gluc, K, Na, BUN)      Severe episode of recurrent major depressive disorder, without psychotic features (H): PHQ-9 score today was a 3. Will restart sertraline as this was helpful.   - sertraline (ZOLOFT) 25 MG tablet  Dispense: 90 tablet; Refill: 3    Anxiety: LEANNE-7 score today was a 10. She reports being anxious about her granddaughter's driving made her anxious today. Will restart her Sertraline. Follow up in 7 weeks.     Dysuria: worsening urgency and frequency. Urine today was normal. Discussed pushing her water intake.   - UA Macro with Reflex to Micro and Culture - lab collect    High priority for 2019-nCoV vaccine: Given bivalent booster today with flu shot as well.       Return in about 7 weeks (around 11/18/2022) for Follow up.    Elissa Meadows CNP  [facility]    Kerry Dc is a 87 year old presenting for the following health issues:  Follow Up and Imm/Inj (COVID-19 VACCINE)      Imm/Inj    History of Present Illness       Mental Health Follow-up:  Patient presents to follow-up on Depression & Anxiety.Patient's depression since last visit has been:  Better  The patient is not having other symptoms associated with depression.  Patient's anxiety since last visit has been:  No change  The patient is not having other symptoms associated with anxiety.  Any significant life events: financial concerns, housing concerns and health concerns  Patient is not feeling anxious or having panic  "attacks.  Patient has no concerns about alcohol or drug use.    Reason for visit:  Checkup    She eats 2-3 servings of fruits and vegetables daily.She consumes 3 sweetened beverage(s) daily.She exercises with enough effort to increase her heart rate 9 or less minutes per day.  She exercises with enough effort to increase her heart rate 3 or less days per week.   She is taking medications regularly.    Today's PHQ-9        PHQ-9 Total Score:    PHQ-9 Q9 Thoughts of better off dead/self-harm past 2 weeks :   Not at all    How difficult have these problems made it for you to do your work, take care of things at home, or get along with other people: Somewhat difficult  Today's LEANNE-7 Score: 10     The patient presents today for follow up.    She reports that the sertraline did really help her anxiety and depression, she is currently out of the medication, ran out a few days ago. Will refill this today.    She reports that she would like a flu and bivalent COVID shot today.    She also reports that her home blood pressures remain elevated. Will start her on hydralazine three times daily to help with this. She is checking her blood pressure daily right now. She is not having symptoms.    She also reports having some increased urinary frequency and urgency. Will check a urine today.    She denies other concerns.     Review of Systems   Constitutional, HEENT, cardiovascular, pulmonary, GI, , musculoskeletal, neuro, skin, endocrine and psych systems are negative, except as otherwise noted.      Objective    BP (!) 190/122   Pulse 84   Ht 1.664 m (5' 5.5\")   Wt 85.7 kg (189 lb)   SpO2 100%   BMI 30.97 kg/m    Body mass index is 30.97 kg/m .  Physical Exam   GENERAL: healthy, alert and no distress  NECK: no adenopathy, no asymmetry, masses, or scars and thyroid normal to palpation  RESP: lungs clear to auscultation - no rales, rhonchi or wheezes  CV: regular rate and rhythm, normal S1 S2, no S3 or S4, no murmur, click or " rub, no peripheral edema and peripheral pulses strong  MS: no gross musculoskeletal defects noted, no edema  SKIN: no suspicious lesions or rashes  NEURO: Normal strength and tone, speech normal, short term memory loss  PSYCH: short term memory loss, affect normal/bright

## 2022-10-04 ASSESSMENT — PATIENT HEALTH QUESTIONNAIRE - PHQ9: SUM OF ALL RESPONSES TO PHQ QUESTIONS 1-9: 3

## 2022-10-18 ENCOUNTER — DOCUMENTATION ONLY (OUTPATIENT)
Dept: OTHER | Facility: CLINIC | Age: 87
End: 2022-10-18

## 2023-06-13 ENCOUNTER — PATIENT OUTREACH (OUTPATIENT)
Dept: CARE COORDINATION | Facility: CLINIC | Age: 88
End: 2023-06-13
Payer: COMMERCIAL

## 2023-06-27 ENCOUNTER — PATIENT OUTREACH (OUTPATIENT)
Dept: CARE COORDINATION | Facility: CLINIC | Age: 88
End: 2023-06-27
Payer: COMMERCIAL

## 2023-06-28 ENCOUNTER — OFFICE VISIT (OUTPATIENT)
Dept: INTERNAL MEDICINE | Facility: CLINIC | Age: 88
End: 2023-06-28
Payer: COMMERCIAL

## 2023-06-28 VITALS
DIASTOLIC BLOOD PRESSURE: 110 MMHG | WEIGHT: 190 LBS | RESPIRATION RATE: 16 BRPM | BODY MASS INDEX: 30.53 KG/M2 | HEIGHT: 66 IN | OXYGEN SATURATION: 98 % | TEMPERATURE: 98.3 F | SYSTOLIC BLOOD PRESSURE: 160 MMHG | HEART RATE: 86 BPM

## 2023-06-28 DIAGNOSIS — E66.09 CLASS 1 OBESITY DUE TO EXCESS CALORIES WITH SERIOUS COMORBIDITY AND BODY MASS INDEX (BMI) OF 31.0 TO 31.9 IN ADULT: ICD-10-CM

## 2023-06-28 DIAGNOSIS — F41.9 ANXIETY: ICD-10-CM

## 2023-06-28 DIAGNOSIS — I10 BENIGN ESSENTIAL HYPERTENSION: ICD-10-CM

## 2023-06-28 DIAGNOSIS — E66.811 CLASS 1 OBESITY DUE TO EXCESS CALORIES WITH SERIOUS COMORBIDITY AND BODY MASS INDEX (BMI) OF 31.0 TO 31.9 IN ADULT: ICD-10-CM

## 2023-06-28 DIAGNOSIS — F33.2 SEVERE EPISODE OF RECURRENT MAJOR DEPRESSIVE DISORDER, WITHOUT PSYCHOTIC FEATURES (H): ICD-10-CM

## 2023-06-28 DIAGNOSIS — N18.31 STAGE 3A CHRONIC KIDNEY DISEASE (H): ICD-10-CM

## 2023-06-28 PROBLEM — R41.3 MEMORY LOSS: Status: ACTIVE | Noted: 2019-09-25

## 2023-06-28 PROBLEM — H90.3 SENSORINEURAL HEARING LOSS, BILATERAL: Status: ACTIVE | Noted: 2021-04-07

## 2023-06-28 PROBLEM — M81.0 SENILE OSTEOPOROSIS: Status: ACTIVE | Noted: 2023-06-28

## 2023-06-28 PROBLEM — S32.030A COMPRESSION FRACTURE OF L3 VERTEBRA (H): Status: ACTIVE | Noted: 2023-06-28

## 2023-06-28 PROBLEM — D35.1 BENIGN NEOPLASM OF PARATHYROID GLAND: Status: ACTIVE | Noted: 2023-06-28

## 2023-06-28 LAB
ANION GAP SERPL CALCULATED.3IONS-SCNC: 11 MMOL/L (ref 7–15)
BUN SERPL-MCNC: 15.8 MG/DL (ref 8–23)
CALCIUM SERPL-MCNC: 9.5 MG/DL (ref 8.8–10.2)
CHLORIDE SERPL-SCNC: 102 MMOL/L (ref 98–107)
CREAT SERPL-MCNC: 1.03 MG/DL (ref 0.51–0.95)
DEPRECATED HCO3 PLAS-SCNC: 26 MMOL/L (ref 22–29)
GFR SERPL CREATININE-BSD FRML MDRD: 52 ML/MIN/1.73M2
GLUCOSE SERPL-MCNC: 156 MG/DL (ref 70–99)
POTASSIUM SERPL-SCNC: 3.9 MMOL/L (ref 3.4–5.3)
SODIUM SERPL-SCNC: 139 MMOL/L (ref 136–145)

## 2023-06-28 PROCEDURE — 36415 COLL VENOUS BLD VENIPUNCTURE: CPT | Performed by: NURSE PRACTITIONER

## 2023-06-28 PROCEDURE — 99214 OFFICE O/P EST MOD 30 MIN: CPT | Performed by: NURSE PRACTITIONER

## 2023-06-28 PROCEDURE — 80048 BASIC METABOLIC PNL TOTAL CA: CPT | Performed by: NURSE PRACTITIONER

## 2023-06-28 PROCEDURE — 96127 BRIEF EMOTIONAL/BEHAV ASSMT: CPT | Performed by: NURSE PRACTITIONER

## 2023-06-28 ASSESSMENT — PATIENT HEALTH QUESTIONNAIRE - PHQ9
10. IF YOU CHECKED OFF ANY PROBLEMS, HOW DIFFICULT HAVE THESE PROBLEMS MADE IT FOR YOU TO DO YOUR WORK, TAKE CARE OF THINGS AT HOME, OR GET ALONG WITH OTHER PEOPLE: SOMEWHAT DIFFICULT
SUM OF ALL RESPONSES TO PHQ QUESTIONS 1-9: 5
SUM OF ALL RESPONSES TO PHQ QUESTIONS 1-9: 5

## 2023-06-28 NOTE — PATIENT INSTRUCTIONS
Watch for any double, blurred vision, headache, confusion, chest pain, chest pressure, or shortness of breath, with your elevated blood pressure like we talked about in office, you are at a very high risk of a heart attack or stroke. ER if having any symptoms.    Your labs are processing, I will release results on my chart once they are back.    Medicare wellness visit is due in 6 months, let's see you back then, before then if anything comes up.

## 2023-06-28 NOTE — PROGRESS NOTES
"  Assessment & Plan     Benign essential hypertension: Blood pressure today was 200/112; 160/110. She refuses to take blood pressure medication. Discussed symptoms to watch for, she is aware of risk.     Anxiety: Continues, but she does not want to take any medication for this, she does not want to speak with a therapist.     Severe episode of recurrent major depressive disorder, without psychotic features (H): Continues, but she will not take medication or speak with a therapist.     Stage 3a chronic kidney disease (H): Last creatinine was 0.98; EGFR of 56 mls/min. Will recheck labs.   - Basic metabolic panel  (Ca, Cl, CO2, Creat, Gluc, K, Na, BUN)    Class 1 obesity due to excess calories with serious comorbidity and body mass index (BMI) of 31.0 to 31.9 in adult: BMI today was 31.14. Continue working on diet and exercise.       BMI:   Estimated body mass index is 31.14 kg/m  as calculated from the following:    Height as of this encounter: 1.664 m (5' 5.5\").    Weight as of this encounter: 86.2 kg (190 lb).   Weight management plan: Discussed healthy diet and exercise guidelines     Elissa Meadows CNP  [facility]    Kerry Dc is a 87 year old, presenting for the following health issues:  Follow Up        6/28/2023     2:47 PM   Additional Questions   Roomed by Domenica SOUSA     History of Present Illness       Reason for visit:  Follow up    She eats 2-3 servings of fruits and vegetables daily.She consumes 0 sweetened beverage(s) daily.She exercises with enough effort to increase her heart rate 20 to 29 minutes per day.  She exercises with enough effort to increase her heart rate 5 days per week.   She is taking medications regularly.    Today's PHQ-9         PHQ-9 Total Score: 5    PHQ-9 Q9 Thoughts of better off dead/self-harm past 2 weeks :   Not at all    How difficult have these problems made it for you to do your work, take care of things at home, or get along " "with other people: Somewhat difficult     The patient presents today for follow up of her blood pressure and anxiety.    She is not taking the Hydralazine. She reports that the last 30 years her blood pressure has been this high, and she has not had any issues. She reports that taking blood pressure medication actually makes her blood pressure higher, and she is not willing to do this.    She denies any headache, double, blurred vision, shortness of breath, chest pain, or chest pressure.    Discussed that she is at a very high risk of stroke or heart attack if she continues off blood pressure medication, she is aware, and okay with this risk.    She reports that she has a three bedroom condo, her daughter who is on dialysis is living with her along with her step daughter, her step daughter's daughter, and her two kids. She has a very full house.    She has a PCA from the [facility] coming out to walk with her 5 days per week. She uses a cane for longer distances.      Review of Systems   Constitutional, HEENT, cardiovascular, pulmonary, GI, , musculoskeletal, neuro, skin, endocrine and psych systems are negative, except as otherwise noted.      Objective    BP (!) 160/110   Pulse 86   Temp 98.3  F (36.8  C)   Resp 16   Ht 1.664 m (5' 5.5\")   Wt 86.2 kg (190 lb)   SpO2 98%   BMI 31.14 kg/m    Body mass index is 31.14 kg/m .  Physical Exam   GENERAL: healthy, alert and no distress  EYES: Eyes grossly normal to inspection  HENT: ear canals and TM's normal, nose and mouth without ulcers or lesions  NECK: no adenopathy, no asymmetry, masses, or scars and thyroid normal to palpation  RESP: lungs clear to auscultation - no rales, rhonchi or wheezes  CV: regular rate and rhythm, normal S1 S2, no S3 or S4, no murmur, click or rub, no peripheral edema  MS: no gross musculoskeletal defects noted  SKIN: no suspicious lesions or rashes  NEURO: Normal strength and tone, mentation intact and speech normal  PSYCH: mentation " appears normal, affect normal/bright

## 2023-09-11 ENCOUNTER — LAB REQUISITION (OUTPATIENT)
Dept: LAB | Facility: CLINIC | Age: 88
End: 2023-09-11
Payer: COMMERCIAL

## 2023-09-11 DIAGNOSIS — I10 ESSENTIAL (PRIMARY) HYPERTENSION: ICD-10-CM

## 2023-09-11 LAB
ANION GAP SERPL CALCULATED.3IONS-SCNC: 12 MMOL/L (ref 7–15)
BUN SERPL-MCNC: 29.2 MG/DL (ref 8–23)
CALCIUM SERPL-MCNC: 8.5 MG/DL (ref 8.8–10.2)
CHLORIDE SERPL-SCNC: 106 MMOL/L (ref 98–107)
CREAT SERPL-MCNC: 1.07 MG/DL (ref 0.51–0.95)
DEPRECATED CALCIDIOL+CALCIFEROL SERPL-MC: 22 UG/L (ref 20–75)
DEPRECATED HCO3 PLAS-SCNC: 23 MMOL/L (ref 22–29)
EGFRCR SERPLBLD CKD-EPI 2021: 50 ML/MIN/1.73M2
ERYTHROCYTE [DISTWIDTH] IN BLOOD BY AUTOMATED COUNT: 17.5 % (ref 10–15)
GLUCOSE SERPL-MCNC: 113 MG/DL (ref 70–99)
HCT VFR BLD AUTO: 29.9 % (ref 35–47)
HGB BLD-MCNC: 9.4 G/DL (ref 11.7–15.7)
MCH RBC QN AUTO: 28.7 PG (ref 26.5–33)
MCHC RBC AUTO-ENTMCNC: 31.4 G/DL (ref 31.5–36.5)
MCV RBC AUTO: 91 FL (ref 78–100)
PLATELET # BLD AUTO: 143 10E3/UL (ref 150–450)
POTASSIUM SERPL-SCNC: 3.5 MMOL/L (ref 3.4–5.3)
RBC # BLD AUTO: 3.28 10E6/UL (ref 3.8–5.2)
SODIUM SERPL-SCNC: 141 MMOL/L (ref 136–145)
TSH SERPL DL<=0.005 MIU/L-ACNC: 3.59 UIU/ML (ref 0.3–4.2)
TSH SERPL DL<=0.005 MIU/L-ACNC: 3.59 UIU/ML (ref 0.3–4.2)
WBC # BLD AUTO: 5.2 10E3/UL (ref 4–11)

## 2023-09-11 PROCEDURE — 85027 COMPLETE CBC AUTOMATED: CPT | Mod: ORL | Performed by: FAMILY MEDICINE

## 2023-09-11 PROCEDURE — 84443 ASSAY THYROID STIM HORMONE: CPT | Mod: ORL | Performed by: FAMILY MEDICINE

## 2023-09-11 PROCEDURE — 82306 VITAMIN D 25 HYDROXY: CPT | Mod: ORL | Performed by: FAMILY MEDICINE

## 2023-09-11 PROCEDURE — 80048 BASIC METABOLIC PNL TOTAL CA: CPT | Mod: ORL | Performed by: FAMILY MEDICINE

## 2023-09-20 ENCOUNTER — LAB REQUISITION (OUTPATIENT)
Dept: LAB | Facility: CLINIC | Age: 88
End: 2023-09-20
Payer: COMMERCIAL

## 2023-09-20 DIAGNOSIS — I10 ESSENTIAL (PRIMARY) HYPERTENSION: ICD-10-CM

## 2023-09-20 LAB
ANION GAP SERPL CALCULATED.3IONS-SCNC: 14 MMOL/L (ref 7–15)
BUN SERPL-MCNC: 26.6 MG/DL (ref 8–23)
CALCIUM SERPL-MCNC: 8.3 MG/DL (ref 8.8–10.2)
CHLORIDE SERPL-SCNC: 102 MMOL/L (ref 98–107)
CREAT SERPL-MCNC: 1.02 MG/DL (ref 0.51–0.95)
DEPRECATED HCO3 PLAS-SCNC: 24 MMOL/L (ref 22–29)
EGFRCR SERPLBLD CKD-EPI 2021: 53 ML/MIN/1.73M2
ERYTHROCYTE [DISTWIDTH] IN BLOOD BY AUTOMATED COUNT: 16.9 % (ref 10–15)
GLUCOSE SERPL-MCNC: 114 MG/DL (ref 70–99)
HCT VFR BLD AUTO: 31.4 % (ref 35–47)
HGB BLD-MCNC: 9.8 G/DL (ref 11.7–15.7)
MCH RBC QN AUTO: 28.7 PG (ref 26.5–33)
MCHC RBC AUTO-ENTMCNC: 31.2 G/DL (ref 31.5–36.5)
MCV RBC AUTO: 92 FL (ref 78–100)
PLATELET # BLD AUTO: 177 10E3/UL (ref 150–450)
POTASSIUM SERPL-SCNC: 3.8 MMOL/L (ref 3.4–5.3)
RBC # BLD AUTO: 3.42 10E6/UL (ref 3.8–5.2)
SODIUM SERPL-SCNC: 140 MMOL/L (ref 136–145)
WBC # BLD AUTO: 5.6 10E3/UL (ref 4–11)

## 2023-09-20 PROCEDURE — 80048 BASIC METABOLIC PNL TOTAL CA: CPT | Mod: ORL | Performed by: NURSE PRACTITIONER

## 2023-09-20 PROCEDURE — 85027 COMPLETE CBC AUTOMATED: CPT | Mod: ORL | Performed by: NURSE PRACTITIONER

## 2023-09-24 ENCOUNTER — HEALTH MAINTENANCE LETTER (OUTPATIENT)
Age: 88
End: 2023-09-24

## 2023-10-06 ENCOUNTER — LAB REQUISITION (OUTPATIENT)
Dept: LAB | Facility: CLINIC | Age: 88
End: 2023-10-06
Payer: COMMERCIAL

## 2023-10-06 DIAGNOSIS — I10 ESSENTIAL (PRIMARY) HYPERTENSION: ICD-10-CM

## 2023-10-06 LAB
ANION GAP SERPL CALCULATED.3IONS-SCNC: 15 MMOL/L (ref 7–15)
BUN SERPL-MCNC: 29.2 MG/DL (ref 8–23)
CALCIUM SERPL-MCNC: 9.3 MG/DL (ref 8.8–10.2)
CHLORIDE SERPL-SCNC: 99 MMOL/L (ref 98–107)
CREAT SERPL-MCNC: 0.92 MG/DL (ref 0.51–0.95)
DEPRECATED HCO3 PLAS-SCNC: 23 MMOL/L (ref 22–29)
EGFRCR SERPLBLD CKD-EPI 2021: 60 ML/MIN/1.73M2
ERYTHROCYTE [DISTWIDTH] IN BLOOD BY AUTOMATED COUNT: 16.8 % (ref 10–15)
GLUCOSE SERPL-MCNC: 129 MG/DL (ref 70–99)
HCT VFR BLD AUTO: 34.4 % (ref 35–47)
HGB BLD-MCNC: 11.2 G/DL (ref 11.7–15.7)
MCH RBC QN AUTO: 29.1 PG (ref 26.5–33)
MCHC RBC AUTO-ENTMCNC: 32.6 G/DL (ref 31.5–36.5)
MCV RBC AUTO: 89 FL (ref 78–100)
PLATELET # BLD AUTO: 182 10E3/UL (ref 150–450)
POTASSIUM SERPL-SCNC: 4.2 MMOL/L (ref 3.4–5.3)
RBC # BLD AUTO: 3.85 10E6/UL (ref 3.8–5.2)
SODIUM SERPL-SCNC: 137 MMOL/L (ref 135–145)
WBC # BLD AUTO: 8.3 10E3/UL (ref 4–11)

## 2023-10-06 PROCEDURE — 85027 COMPLETE CBC AUTOMATED: CPT | Mod: ORL | Performed by: FAMILY MEDICINE

## 2023-10-06 PROCEDURE — 80048 BASIC METABOLIC PNL TOTAL CA: CPT | Mod: ORL | Performed by: FAMILY MEDICINE

## 2023-10-13 ENCOUNTER — LAB REQUISITION (OUTPATIENT)
Dept: LAB | Facility: CLINIC | Age: 88
End: 2023-10-13
Payer: COMMERCIAL

## 2023-10-13 DIAGNOSIS — I10 ESSENTIAL (PRIMARY) HYPERTENSION: ICD-10-CM

## 2023-10-13 LAB
ANION GAP SERPL CALCULATED.3IONS-SCNC: 14 MMOL/L (ref 7–15)
BUN SERPL-MCNC: 28.6 MG/DL (ref 8–23)
CALCIUM SERPL-MCNC: 8.9 MG/DL (ref 8.8–10.2)
CHLORIDE SERPL-SCNC: 100 MMOL/L (ref 98–107)
CREAT SERPL-MCNC: 1.03 MG/DL (ref 0.51–0.95)
DEPRECATED HCO3 PLAS-SCNC: 24 MMOL/L (ref 22–29)
EGFRCR SERPLBLD CKD-EPI 2021: 52 ML/MIN/1.73M2
ERYTHROCYTE [DISTWIDTH] IN BLOOD BY AUTOMATED COUNT: 16.9 % (ref 10–15)
GLUCOSE SERPL-MCNC: 123 MG/DL (ref 70–99)
HCT VFR BLD AUTO: 29.4 % (ref 35–47)
HGB BLD-MCNC: 9.5 G/DL (ref 11.7–15.7)
MCH RBC QN AUTO: 29 PG (ref 26.5–33)
MCHC RBC AUTO-ENTMCNC: 32.3 G/DL (ref 31.5–36.5)
MCV RBC AUTO: 90 FL (ref 78–100)
PLATELET # BLD AUTO: 146 10E3/UL (ref 150–450)
POTASSIUM SERPL-SCNC: 3.8 MMOL/L (ref 3.4–5.3)
RBC # BLD AUTO: 3.28 10E6/UL (ref 3.8–5.2)
SODIUM SERPL-SCNC: 138 MMOL/L (ref 135–145)
WBC # BLD AUTO: 4.8 10E3/UL (ref 4–11)

## 2023-10-13 PROCEDURE — 80048 BASIC METABOLIC PNL TOTAL CA: CPT | Mod: ORL | Performed by: NURSE PRACTITIONER

## 2023-10-13 PROCEDURE — 85027 COMPLETE CBC AUTOMATED: CPT | Mod: ORL | Performed by: NURSE PRACTITIONER

## 2023-10-25 ENCOUNTER — LAB REQUISITION (OUTPATIENT)
Dept: LAB | Facility: CLINIC | Age: 88
End: 2023-10-25
Payer: COMMERCIAL

## 2023-10-25 DIAGNOSIS — D50.9 IRON DEFICIENCY ANEMIA, UNSPECIFIED: ICD-10-CM

## 2023-10-25 DIAGNOSIS — I10 ESSENTIAL (PRIMARY) HYPERTENSION: ICD-10-CM

## 2023-10-25 LAB
ANION GAP SERPL CALCULATED.3IONS-SCNC: 15 MMOL/L (ref 7–15)
BUN SERPL-MCNC: 26.5 MG/DL (ref 8–23)
CALCIUM SERPL-MCNC: 9.8 MG/DL (ref 8.8–10.2)
CHLORIDE SERPL-SCNC: 98 MMOL/L (ref 98–107)
CREAT SERPL-MCNC: 1.01 MG/DL (ref 0.51–0.95)
DEPRECATED HCO3 PLAS-SCNC: 24 MMOL/L (ref 22–29)
EGFRCR SERPLBLD CKD-EPI 2021: 53 ML/MIN/1.73M2
ERYTHROCYTE [DISTWIDTH] IN BLOOD BY AUTOMATED COUNT: 17 % (ref 10–15)
GLUCOSE SERPL-MCNC: 129 MG/DL (ref 70–99)
HCT VFR BLD AUTO: 35.8 % (ref 35–47)
HGB BLD-MCNC: 11.6 G/DL (ref 11.7–15.7)
MCH RBC QN AUTO: 29.1 PG (ref 26.5–33)
MCHC RBC AUTO-ENTMCNC: 32.4 G/DL (ref 31.5–36.5)
MCV RBC AUTO: 90 FL (ref 78–100)
PLATELET # BLD AUTO: 163 10E3/UL (ref 150–450)
POTASSIUM SERPL-SCNC: 4 MMOL/L (ref 3.4–5.3)
RBC # BLD AUTO: 3.98 10E6/UL (ref 3.8–5.2)
SODIUM SERPL-SCNC: 137 MMOL/L (ref 135–145)
WBC # BLD AUTO: 5.3 10E3/UL (ref 4–11)

## 2023-10-25 PROCEDURE — 85027 COMPLETE CBC AUTOMATED: CPT | Mod: ORL | Performed by: FAMILY MEDICINE

## 2023-10-25 PROCEDURE — 80048 BASIC METABOLIC PNL TOTAL CA: CPT | Mod: ORL | Performed by: FAMILY MEDICINE

## 2023-11-13 ENCOUNTER — OFFICE VISIT (OUTPATIENT)
Dept: FAMILY MEDICINE | Facility: CLINIC | Age: 88
End: 2023-11-13
Payer: COMMERCIAL

## 2023-11-13 ENCOUNTER — NURSE TRIAGE (OUTPATIENT)
Dept: NURSING | Facility: CLINIC | Age: 88
End: 2023-11-13

## 2023-11-13 VITALS
TEMPERATURE: 98.7 F | SYSTOLIC BLOOD PRESSURE: 220 MMHG | DIASTOLIC BLOOD PRESSURE: 99 MMHG | OXYGEN SATURATION: 99 % | HEART RATE: 91 BPM | RESPIRATION RATE: 22 BRPM | BODY MASS INDEX: 29.83 KG/M2 | WEIGHT: 182 LBS

## 2023-11-13 DIAGNOSIS — Z53.29 LEFT AGAINST MEDICAL ADVICE: ICD-10-CM
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
## 2023-11-13 DIAGNOSIS — H72.91 PERFORATION OF TYMPANIC MEMBRANE, RIGHT: Primary | ICD-10-CM

## 2023-11-13 DIAGNOSIS — I10 BENIGN ESSENTIAL HYPERTENSION: ICD-10-CM

## 2023-11-13 DIAGNOSIS — R42 DIZZINESS: ICD-10-CM

## 2023-11-13 PROCEDURE — 99215 OFFICE O/P EST HI 40 MIN: CPT | Performed by: NURSE PRACTITIONER

## 2023-11-13 ASSESSMENT — ENCOUNTER SYMPTOMS
FEVER: 0
DIZZINESS: 1
HEADACHES: 0
LIGHT-HEADEDNESS: 0
WEAKNESS: 0
SHORTNESS OF BREATH: 0

## 2023-11-13 NOTE — TELEPHONE ENCOUNTER
"Nurse Triage SBAR    Is this a 2nd Level Triage? YES, LICENSED PRACTITIONER REVIEW IS REQUIRED    Situation:  Home Care nurse, Lisa,  calling with concerns about patient who checked herself out of transitional care almost two weeks ago and is back in her apartment. Patient is complaining of ear pain and congestion. Patient also has several flea bites on her legs and neck as well as redness and edema in lower extremities. Patient is declining urgent care or going to any clinic that is not close to her house. Nurse is asking if it would be possible for patient to be seen in clinic in [place] today or tomorrow. Patient was triaged and protocol advises patient is seen today. Home care nurse will also try to convince patient [facility].  Consent: obtained verbally from patient    Background:  Patient was hospitalized back in August and discharged to transitional care.  About 10-14 days ago patient checked herself out of transitional care back to her apartment.   Patient is not currently taking any medications.  Only open to going to a clinic nearby - [place]    Assessment:   Ear pain and congestion - \"my one ear is dead\"  No fever  Confusion and memory loss  Flea bites on leg - one is oozing  Lower extremity redness and edema  - +3 edema right leg, +2 left leg  No shortness of breath or chest pain  Able to walk    Protocol Recommended Disposition:   See in office today    Recommendation: Advised patient to go to urgent care, but patient is declining. Only wants to go to [place] [facility] which is close to her. Patient is not concerned about her symptoms, but home care nurse is very concerned and would like patient to be seen. May call home care nurse at [phone] if needed. Ok to leave a message.    Routing to provider to review and advise.    Does the patient meet one of the following criteria for ADS visit consideration? 16+ years old, with an [facility] PCP     TIP  Providers, please " consider if this condition is appropriate for management at one of our Acute and Diagnostic Services sites.     If patient is a good candidate, please use dotphrase <dot>triageresponse and select Refer to ADS to document.     Anne Mendez RN [facility] 11/13/2023 2:17 PM  Reason for Disposition   All other earaches  (Exceptions: Earache lasting < 1 hour, and earache from air travel.)   MODERATE swelling of both ankles (e.g., swelling extends up to the knees) AND new-onset or worsening    Additional Information   Negative: Sounds like a life-threatening emergency to the triager   Negative: Chest pain   Negative: Followed an insect bite and has localized swelling (e.g., small area of puffy or swollen skin)   Negative: Followed a knee injury   Negative: Ankle or foot injury   Negative: Pregnant with leg swelling or edema   Negative: Difficulty breathing at rest   Negative: Entire foot is cool or blue in comparison to other side   Negative: SEVERE swelling (e.g., swelling extends above knee, entire leg is swollen, weeping fluid)   Negative: Cast on leg or ankle and has increasing pain   Negative: Can't walk or can barely stand (new-onset)   Negative: Fever and red area (or area very tender to touch)   Negative: Patient sounds very sick or weak to the triager   Negative: Swelling of face, arm or hands  (Exception: Slight puffiness of fingers during hot weather.)   Negative: Pregnant 20 or more weeks and sudden weight gain (i.e., > 2 lbs, 1 kg in one week)   Negative: Thigh or calf pain and only 1 side and present > 1 hour   Negative: Thigh, calf, or ankle swelling in only one leg   Negative: Thigh, calf, or ankle swelling in both legs, but one side is definitely more swollen (Exception: Longstanding difference between legs.)    Protocols used: Earache-A-OH, Leg Swelling and Edema-A-OH

## 2023-11-13 NOTE — PROGRESS NOTES
Assessment & Plan     Perforation of tympanic membrane, right    - Primary Care - Care Coordination Referral    Dizziness  -Off-and-on, no dizziness here.  Has walker.  May be related to uncontrolled hypertension.    Benign essential hypertension  -Requires med restart    Flea bite, initial encounter  -Identify source of fleas.  Call .  Son-in-law says she already got rid of her cats.    Left against medical advice  -From TCU, no follow-up     Patient who was in a TCU following meningitis and sepsis inpatient admission who said she was worried about somebody having told her at the TCU that she should sell her house to pay for the TCU admission so she decided to leave AMA.  Unclear exactly when this happened, but it was about a week and a half ago.  Patient went back to her home which was flea infested and received several bites.    Initial blood pressure here was 249/106 with recheck at 220/99. Legs have been swollen.  Blood pressure is usually controlled when on her medications.  She has not been on any of her medications since leaving the nursing home. Patient lives alone.  Could not get onto my exam table, but was otherwise able to walk around the clinic.  Says she is having dizzy spells at home.  Is able to normally walk with a walker.  No headaches.  No chest pain.    After long discussion with son-in-law who is here, there really is not any obvious person available to take her to a primary care appointment.  Her son-in-law does not have a car, she has a niece who sleeps during the day, and her daughter is hospitalized currently.  She does not have any money for a taxicab.      She does have a niece available to drop her off at the emergency room today.    Initial blood pressure here was 249/106 with recheck at 220/99.    Patient does not want to go back to the TCU.     Patient is willing to speak with a  at the hospital if needed.    Explained that if they do not go to the hospital  tonight that they need to call her  (whom she describes as a lady that takes her for walks which may or may not be a  - no contact info available) and have her help her schedule a primary care appointment.  She sounds like her only reliable source of transportation, but she cannot drive more than 4 miles away from her home.    Did recommend emergency room tonight to address the dizziness, hypertension.     Her signed in reason for urgent care was difficulty hearing out of the right ear.  She does have sensorineural hearing loss and does appear to have a right TM rupture.  Can be rechecked in a couple of weeks associated with her other primary care visits.  To avoid dunking head underwater in the meantime.    I attempted to make a vulnerable adult report, but this was going to take 45 minutes.  We will see if patient goes to emergency room tonight and if she is able to speak to the  there.              Return today (on 11/13/2023).    [facility]    Kerry Dc is a 88 year old female who presents to clinic today for the following health issues:  Chief Complaint   Patient presents with    Ear Problem     Can't hear out of RT ear, Lt ear can hear a little, imbalance when walking.     Insect Bites     Both legs, flea bites possibly infected.      HPI    Difficulty hearing out of right ear.  Does have bilateral sensorineural hearing loss and wears hearing aids.  Says right ear has been getting worse for the last 2 weeks.  Denies any drainage.    Been getting dizzy spells.  Says they come and go.  No headaches.    Has not been taking any of her blood pressure medications since leaving transitional care unit.  She was hospitalized for bacterial meningitis with sepsis and ended up in transitional care.  She says she left the transitional care prior to being officially discharged and had no follow-up plan.    Blood pressure here  "is 249/106.    Feeling a little off balance.  Uses a walker at home.      Patient has no primary care provider.  Says she has a  whose number and name she does not know and they can drive her up to 4 miles away from her house. She \"goes on walks\" with them daily.   I did try to get her in a appointment in [place] and they did not have any PCP appointments till December 7.    Family members either do not have cars, are hospitalized, or work after long discussion about ability to get her to a primary care appointment.            Review of Systems   Constitutional:  Negative for fever.   Respiratory:  Negative for shortness of breath.    Cardiovascular:  Positive for peripheral edema. Negative for chest pain.   Neurological:  Positive for dizziness. Negative for weakness, light-headedness and headaches.               Objective    BP (!) 220/99   Pulse 91   Temp 98.7  F (37.1  C) (Oral)   Resp 22   Wt 82.6 kg (182 lb)   SpO2 99%   BMI 29.83 kg/m    Physical Exam  Constitutional:       Appearance: Normal appearance.   HENT:      Right Ear: Tympanic membrane is perforated.      Left Ear: Tympanic membrane normal.   Pulmonary:      Effort: Pulmonary effort is normal.   Musculoskeletal:      Right lower leg: Edema present.      Left lower leg: Edema present.   Skin:     Findings: Lesion (Several bites located bilateral lower extremities) present.   Neurological:      Mental Status: She is alert.      Gait: Gait abnormal (Unable to get onto exam table = walking with a limp).   Psychiatric:         Mood and Affect: Mood normal.                  "

## 2023-11-13 NOTE — TELEPHONE ENCOUNTER
Outgoing call to [facility]     Home care called [facility] - unable to be seen till the end of the month.     Pt wants to established care in [facility] due to transportation issue per homecare.     Pt agree to go to [facility] urgent care with a member living with her at 3 pm.   Home care concern about vulnerable adult.     No record saying she was seen today at [facility].   Outgoing call to pt, no answer. LMTCB.   Calling to check on pt and to provide number for her to established care in [facility] - all pcp accepting pts at this time per tc.     Lisa - Home care will follow up with her and call EMS if she's still at home.     Kev P. RN

## 2023-11-14 ENCOUNTER — PATIENT OUTREACH (OUTPATIENT)
Dept: CARE COORDINATION | Facility: CLINIC | Age: 88
End: 2023-11-14
Payer: COMMERCIAL

## 2023-11-14 NOTE — PROGRESS NOTES
"Clinic Care Coordination Contact    Situation: Patient chart reviewed by care coordinator.    Background: CCC Referral to assist with follow up and care coordination from patient's elopement from TCU.  It was recommended that patient return to the ED 11/13.  Not viewable in Epic that this was completed.  VA was completed 11/14 by CCC SW.    Assessment: Call to the home.  Female answered stating she was patient's dtr.  No consent to communicate on file.  She stated Vanda \"was in a meeting\" right now and couldn't speak.    Plan/Recommendations: Writer will call again later today.      "

## 2023-11-14 NOTE — PROGRESS NOTES
Clinic Care Coordination Contact    Situation: Patient chart reviewed by care coordinator.    Background: CCC referral received     Assessment: CC reviewed referral and chart, concerning re: vunerable adult. Appropriate Report made via phone 11/14/23, case # [id number]    Plan/Recommendations: [facility] updated PCP to report being made. [facility] completed PHI Disclosure Form and sent via email to HIMs. RNCC to reach out to pt to check on health status and encourage trip to ER. SWCC and RNCC to consult.

## 2023-11-14 NOTE — PATIENT INSTRUCTIONS
I believe you have a hole in your right eardrum.  Avoid taking tub baths or dunking your head underwater.  Use a cottonball in your ear if you take a shower.    I recommend going to the emergency room to have your high blood pressure and dizziness evaluated.  Should be done tonight.    For some reason you do not go to the emergency room, I would call your  tomorrow and have her work on getting you a primary care appointment later this week at any available facility.     Recommend having you stay with a family member 24 hours a day until you are feeling better.

## 2023-11-14 NOTE — PROGRESS NOTES
Clinic Care Coordination Contact  [facility]/Voicemail    Clinical Data: Care Coordinator Outreach    Outreach Documentation Number of Outreach Attempt   11/14/2023  11:15 AM 1       Left message on patient's voicemail with call back information and requested return call.    Plan:  Care Coordinator will call later today.    CCC RN, CCC SW and PCP to coordinate and consult

## 2023-11-14 NOTE — PROGRESS NOTES
Clinic Care Coordination Contact    Situation: Patient chart reviewed by care coordinator.    Background: CCC Referral received to follow up on home safety concerns, elevated bp and patient eloping from her TCU.  Patient was instructed to go to the ED yesterday 11/13 due to hypertension and did not go.    Assessment: Spoke with patient briefly today and she instructed me to talk with her granddtr Maira as she was unable to hear me.  dtr Maira also declining to speak with this writer and handed the phone to Vanda's dtr Alisson.  Patient gave verbal consent to speak with her as well.  Alisson stating patient did not want to go to the ED yesterday or today.  She stated her BP 'just runs high' and the patient did not have any concerns.  BP at yesterdays visit was 220/99.  Patient additionally does not have any medications/scripts from TCU discharge.  They declined to speak with CCC SW or CCC RN for any resources or ongoing needs.    Plan/Recommendations: Writer was able to get them to agree to PCP follow up.    PCP aware of above situation already.  Writer has discussed this with provider via secure chat.  Will reach out to PCP's teach to assist with scheduling.  Closing CCC Referral as family is declining to speak with CCC

## 2023-11-15 ENCOUNTER — OFFICE VISIT (OUTPATIENT)
Dept: URGENT CARE | Facility: URGENT CARE | Age: 88
End: 2023-11-15
Payer: COMMERCIAL

## 2023-11-15 VITALS
WEIGHT: 176.3 LBS | SYSTOLIC BLOOD PRESSURE: 204 MMHG | HEART RATE: 77 BPM | DIASTOLIC BLOOD PRESSURE: 67 MMHG | BODY MASS INDEX: 28.89 KG/M2 | TEMPERATURE: 97.7 F | OXYGEN SATURATION: 96 % | RESPIRATION RATE: 18 BRPM
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
## 2023-11-15 DIAGNOSIS — I10 HYPERTENSION, UNSPECIFIED TYPE: ICD-10-CM

## 2023-11-15 DIAGNOSIS — S80.861A: ICD-10-CM

## 2023-11-15 DIAGNOSIS — L03.115 CELLULITIS OF RIGHT LOWER EXTREMITY: Primary | ICD-10-CM
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
## 2023-11-15 DIAGNOSIS — S80.862A FLEA BITE OF LEFT LOWER LEG, INITIAL ENCOUNTER: ICD-10-CM

## 2023-11-15 PROCEDURE — 99214 OFFICE O/P EST MOD 30 MIN: CPT | Performed by: NURSE PRACTITIONER

## 2023-11-15 RX ORDER — DOXYCYCLINE 100 MG/1
100 CAPSULE ORAL 2 TIMES DAILY
Qty: 14 CAPSULE | Refills: 0 | Status: SHIPPED | OUTPATIENT
Start: 2023-11-15 | End: 2023-11-22

## 2023-11-15 RX ORDER — PERMETHRIN 50 MG/G
CREAM TOPICAL
Qty: 60 G | Refills: 1 | Status: SHIPPED | OUTPATIENT
Start: 2023-11-15 | End: 2024-05-31

## 2023-11-15 RX ORDER — CHLORTHALIDONE 25 MG/1
25 TABLET ORAL DAILY
Qty: 14 TABLET | Refills: 0 | Status: SHIPPED | OUTPATIENT
Start: 2023-11-15 | End: 2024-05-31

## 2023-11-15 NOTE — PROGRESS NOTES
SUBJECTIVE:   Vanda Sanchez is a 88 year old female presenting with a chief complaint of   Chief Complaint   Patient presents with    Leg Problem     Possible infection on right leg, oozing, swelling. Onset- two days     Insect Bites     Bites on right leg    Pt accompanied by 2 caregivers from home agency. They state Vanda has just been discharged from home care services despite needing the services. Now has flea bites to bilat lower legs, some with surrounding redness and weeping areas. Pt tells me the spots itch.  Caregivers report the pt used to have cats, the cats have been removed from her home. And the home was cleaned with hot water, assuming these were flea bites.  Pt also needs to find new primary provider that is closer to home.    Past Medical History:   Diagnosis Date    Allergic rhinitis 12/11/2012    Arthritis     Disease of thyroid gland     DJD (degenerative joint disease) 12/11/2012    Dyslipidemia 12/11/2012    History of transfusion     HTN (hypertension) 12/11/2012    Hypertension     Obesity 12/12/2012    Osteopenia 12/11/2012    Pre-diabetes 12/11/2012    S/P total hip arthroplasty     Vitamin D deficiency 12/11/2012     Family History   Problem Relation Age of Onset    Heart Disease Mother     Lipids Brother     Cancer Mother     Diabetes Mother     Cancer Father     Heart Disease Father     Cancer Sister     Hypertension Sister     Cancer Brother     Heart Disease Brother     Cancer Maternal Grandmother      Current Outpatient Medications   Medication Sig Dispense Refill    Ascorbic Acid (VITAMIN C) 100 MG CHEW Take  by mouth. (Patient not taking: Reported on 11/13/2023)      fluticasone (FLONASE) 50 MCG/ACT nasal spray Spray 1 spray into both nostrils daily (Patient not taking: Reported on 11/13/2023) 16 g 1     Social History     Tobacco Use    Smoking status: Never    Smokeless tobacco: Never   Substance Use Topics    Alcohol use: No       OBJECTIVE  BP (!) 204/67 (BP Location: Left  arm, Patient Position: Sitting, Cuff Size: Adult Regular)   Pulse 77   Temp 97.7  F (36.5  C) (Oral)   Resp 18   Wt 80 kg (176 lb 4.8 oz)   SpO2 96%   BMI 28.89 kg/m      Physical Exam  Musculoskeletal:        Legs:       Comments: Multiple bite marks excoriated R>L. Several on right leg that are swollen, have surrounding redness and have been weeping.   Also has mild bilat edema mid shin to ankles.       Will have her complete treatment for mites with permethrin cream.   Staff is also concerned with pt's blood pressure, it is quite high in clinic, they tell me she has an appointment next week to see someone but wonder about starting her back on a medication she was previously on for blood pressure. I think this is reasonable as long as she follows up and they or patient area able to check blood pressures daily at home.   -Doxycycline for skin infection of the lower legs.    ASSESSMENT:  1. Cellulitis of right lower extremity    - doxycycline hyclate (VIBRAMYCIN) 100 MG capsule; Take 1 capsule (100 mg) by mouth 2 times daily for 7 days  Dispense: 14 capsule; Refill: 0    2. Flea bite of left lower leg, initial encounter    - permethrin (ELIMITE) 5 % external cream; Apply cream from head to toe (except the face); leave on for 8-14 hours then wash off with water; reapply in 1 week if live mites appear.  Dispense: 60 g; Refill: 1    3. Flea bite of right lower leg, initial encounter    - permethrin (ELIMITE) 5 % external cream; Apply cream from head to toe (except the face); leave on for 8-14 hours then wash off with water; reapply in 1 week if live mites appear.  Dispense: 60 g; Refill: 1    4. Hypertension, unspecified type    - chlorthalidone (HYGROTON) 25 MG tablet; Take 1 tablet (25 mg) by mouth daily for 14 days  Dispense: 14 tablet; Refill: 0  - Home Blood Pressure Monitor Order for DME - ONLY FOR DME      PLAN:  1) Take antibiotic as prescribed. Take this medication with food to avoid stomach upset.   2)  Ibuprofen or Tylenol as needed for fever or pain.  3) Follow up in 7-10 days if not improving, sooner if worsening or other concerns.   ____________  Use cream for the bites one time. Apply head to toe except for face. Leave on 8-14 hours and wash off.   Thorough cleaning of the home is recommended to rid house of mites.

## 2023-11-15 NOTE — PATIENT INSTRUCTIONS
1) Take antibiotic as prescribed. Take this medication with food to avoid stomach upset.   2) Ibuprofen or Tylenol as needed for fever or pain.  3) Follow up in 7-10 days if not improving, sooner if worsening or other concerns.   ____________  Use cream for the bites one time. Apply head to toe except for face. Leave on 8-14 hours and wash off.   Thorough cleaning of the home is recommended to rid house of mites.

## 2023-11-22 ENCOUNTER — OFFICE VISIT (OUTPATIENT)
Dept: INTERNAL MEDICINE | Facility: CLINIC | Age: 88
End: 2023-11-22
Payer: COMMERCIAL

## 2023-11-22 VITALS
RESPIRATION RATE: 18 BRPM | HEART RATE: 60 BPM | OXYGEN SATURATION: 98 % | WEIGHT: 176 LBS | BODY MASS INDEX: 29.32 KG/M2 | TEMPERATURE: 97.9 F | SYSTOLIC BLOOD PRESSURE: 162 MMHG | HEIGHT: 65 IN | DIASTOLIC BLOOD PRESSURE: 100 MMHG

## 2023-11-22 DIAGNOSIS — I10 BENIGN ESSENTIAL HYPERTENSION: ICD-10-CM

## 2023-11-22 DIAGNOSIS — N18.31 STAGE 3A CHRONIC KIDNEY DISEASE (H): ICD-10-CM

## 2023-11-22 DIAGNOSIS — Z13.0 SCREENING FOR ENDOCRINE, NUTRITIONAL, METABOLIC AND IMMUNITY DISORDER: ICD-10-CM

## 2023-11-22 DIAGNOSIS — Z13.228 SCREENING FOR ENDOCRINE, NUTRITIONAL, METABOLIC AND IMMUNITY DISORDER: ICD-10-CM

## 2023-11-22 DIAGNOSIS — F33.2 SEVERE EPISODE OF RECURRENT MAJOR DEPRESSIVE DISORDER, WITHOUT PSYCHOTIC FEATURES (H): ICD-10-CM

## 2023-11-22 DIAGNOSIS — Z00.00 MEDICARE ANNUAL WELLNESS VISIT, SUBSEQUENT: Primary | ICD-10-CM
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
## 2023-11-22 DIAGNOSIS — Z13.29 SCREENING FOR ENDOCRINE, NUTRITIONAL, METABOLIC AND IMMUNITY DISORDER: ICD-10-CM

## 2023-11-22 DIAGNOSIS — Z13.21 SCREENING FOR ENDOCRINE, NUTRITIONAL, METABOLIC AND IMMUNITY DISORDER: ICD-10-CM

## 2023-11-22 PROBLEM — G00.9 BACTERIAL MENINGITIS: Status: ACTIVE | Noted: 2023-08-23

## 2023-11-22 LAB
ALBUMIN SERPL BCG-MCNC: 4 G/DL (ref 3.5–5.2)
ALP SERPL-CCNC: 72 U/L (ref 40–150)
ALT SERPL W P-5'-P-CCNC: 12 U/L (ref 0–50)
ANION GAP SERPL CALCULATED.3IONS-SCNC: 11 MMOL/L (ref 7–15)
AST SERPL W P-5'-P-CCNC: 20 U/L (ref 0–45)
BASOPHILS # BLD AUTO: 0.1 10E3/UL (ref 0–0.2)
BASOPHILS NFR BLD AUTO: 1 %
BILIRUB SERPL-MCNC: 0.4 MG/DL
BUN SERPL-MCNC: 10.5 MG/DL (ref 8–23)
CALCIUM SERPL-MCNC: 9.5 MG/DL (ref 8.8–10.2)
CHLORIDE SERPL-SCNC: 99 MMOL/L (ref 98–107)
CREAT SERPL-MCNC: 1 MG/DL (ref 0.51–0.95)
DEPRECATED HCO3 PLAS-SCNC: 24 MMOL/L (ref 22–29)
EGFRCR SERPLBLD CKD-EPI 2021: 54 ML/MIN/1.73M2
EOSINOPHIL # BLD AUTO: 0.2 10E3/UL (ref 0–0.7)
EOSINOPHIL NFR BLD AUTO: 3 %
ERYTHROCYTE [DISTWIDTH] IN BLOOD BY AUTOMATED COUNT: 14.3 % (ref 10–15)
GLUCOSE SERPL-MCNC: 110 MG/DL (ref 70–99)
HCT VFR BLD AUTO: 36.7 % (ref 35–47)
HGB BLD-MCNC: 12.1 G/DL (ref 11.7–15.7)
IMM GRANULOCYTES # BLD: 0 10E3/UL
IMM GRANULOCYTES NFR BLD: 0 %
LYMPHOCYTES # BLD AUTO: 1.2 10E3/UL (ref 0.8–5.3)
LYMPHOCYTES NFR BLD AUTO: 20 %
MCH RBC QN AUTO: 28.9 PG (ref 26.5–33)
MCHC RBC AUTO-ENTMCNC: 33 G/DL (ref 31.5–36.5)
MCV RBC AUTO: 88 FL (ref 78–100)
MONOCYTES # BLD AUTO: 0.5 10E3/UL (ref 0–1.3)
MONOCYTES NFR BLD AUTO: 7 %
NEUTROPHILS # BLD AUTO: 4.3 10E3/UL (ref 1.6–8.3)
NEUTROPHILS NFR BLD AUTO: 70 %
PLATELET # BLD AUTO: 205 10E3/UL (ref 150–450)
POTASSIUM SERPL-SCNC: 3.9 MMOL/L (ref 3.4–5.3)
PROT SERPL-MCNC: 7 G/DL (ref 6.4–8.3)
RBC # BLD AUTO: 4.19 10E6/UL (ref 3.8–5.2)
SODIUM SERPL-SCNC: 134 MMOL/L (ref 135–145)
WBC # BLD AUTO: 6.2 10E3/UL (ref 4–11)

## 2023-11-22 PROCEDURE — 36415 COLL VENOUS BLD VENIPUNCTURE: CPT | Performed by: NURSE PRACTITIONER

## 2023-11-22 PROCEDURE — 99214 OFFICE O/P EST MOD 30 MIN: CPT | Mod: 25 | Performed by: NURSE PRACTITIONER

## 2023-11-22 PROCEDURE — G0439 PPPS, SUBSEQ VISIT: HCPCS | Performed by: NURSE PRACTITIONER

## 2023-11-22 PROCEDURE — 80053 COMPREHEN METABOLIC PANEL: CPT | Performed by: NURSE PRACTITIONER

## 2023-11-22 PROCEDURE — 85025 COMPLETE CBC W/AUTO DIFF WBC: CPT | Performed by: NURSE PRACTITIONER

## 2023-11-22 RX ORDER — HYDRALAZINE HYDROCHLORIDE 50 MG/1
50 TABLET, FILM COATED ORAL
COMMUNITY
Start: 2023-09-06 | End: 2023-11-22

## 2023-11-22 RX ORDER — SERTRALINE HYDROCHLORIDE 25 MG/1
25 TABLET, FILM COATED ORAL DAILY
COMMUNITY
End: 2024-06-28

## 2023-11-22 RX ORDER — HYDRALAZINE HYDROCHLORIDE 50 MG/1
50 TABLET, FILM COATED ORAL 2 TIMES DAILY
Qty: 180 TABLET | Refills: 3 | Status: SHIPPED | OUTPATIENT
Start: 2023-11-22 | End: 2024-07-19

## 2023-11-22 RX ORDER — TRIAMCINOLONE ACETONIDE 1 MG/G
CREAM TOPICAL 2 TIMES DAILY
Qty: 30 G | Refills: 1 | Status: SHIPPED | OUTPATIENT
Start: 2023-11-22 | End: 2024-06-28

## 2023-11-22 ASSESSMENT — PATIENT HEALTH QUESTIONNAIRE - PHQ9
10. IF YOU CHECKED OFF ANY PROBLEMS, HOW DIFFICULT HAVE THESE PROBLEMS MADE IT FOR YOU TO DO YOUR WORK, TAKE CARE OF THINGS AT HOME, OR GET ALONG WITH OTHER PEOPLE: NOT DIFFICULT AT ALL
SUM OF ALL RESPONSES TO PHQ QUESTIONS 1-9: 0
SUM OF ALL RESPONSES TO PHQ QUESTIONS 1-9: 0

## 2023-11-22 ASSESSMENT — ACTIVITIES OF DAILY LIVING (ADL): CURRENT_FUNCTION: TRANSPORTATION REQUIRES ASSISTANCE

## 2023-11-22 NOTE — PROGRESS NOTES
"SUBJECTIVE:   Vanda is a 88 year old, presenting for the following:  Wellness Visit        11/22/2023     1:04 PM   Additional Questions   Roomed by Domenica SOUSA       Are you in the first 12 months of your Medicare coverage?  No    The patient presents today with her granddaughter for follow up. She is not fasted today.    She reports that she has been scratching her flea bites on her legs. They have tried to get rid of the fleas, they have sprayed her recliner, and also have used a fogger in the house 3 times, and gotten rid of their 6 cats, but there are still some fleas there. They are trying to get an  out, but it costs $350 and the granddaughter is the only one currently working.    She never got the prescription for the chlorthalidone from pharmacy. She is on Hydralazine 50mg daily, will increase this dose to Hydralazine 50mg twice daily. Discussed getting a cuff and checking blood pressures at home. She denies any double, blurred vision, headache, chest pain, chest pressure, or shortness of breath.    We will check her labs today. She has been itching her legs, will order TMC cream to stop this. She did take the Doxycycline, and legs do no appear to be infected.     Hearing has been very bad lately, she has an appointment set up with ENT going forward.             Healthy Habits:     In general, how would you rate your overall health?  Good    Frequency of exercise:  4-5 days/week    Duration of exercise:  45-60 minutes    Do you usually eat at least 4 servings of fruit and vegetables a day, include whole grains    & fiber and avoid regularly eating high fat or \"junk\" foods?  No    Taking medications regularly:  Yes    Medication side effects:  None    Ability to successfully perform activities of daily living:  Transportation requires assistance    Home Safety:  No safety concerns identified    Hearing Impairment:  Difficulty following a conversation in a noisy restaurant or crowded room    In the " past 6 months, have you been bothered by leaking of urine? Yes    In general, how would you rate your overall mental or emotional health?  Good    Additional concerns today:  No    Have you ever done Advance Care Planning? (For example, a Health Directive, POLST, or a discussion with a medical provider or your loved ones about your wishes): Yes, advance care planning is on file.       Fall risk  Fallen 2 or more times in the past year?: No  Any fall with injury in the past year?: No    Cognitive Screening   1) Repeat 3 items (Leader, Season, Table)    2) Clock draw: ABNORMAL Couldn't do time  3) 3 item recall: Recalls 3 objects  Results: ABNORMAL clock, 1-2 items recalled: PROBABLE COGNITIVE IMPAIRMENT, **INFORM PROVIDER**    Mini-CogTM Copyright TOSHA Shoemaker. Licensed by the author for use in [facility]; reprinted with permission (joseph@[facility]). All rights reserved.      Do you have sleep apnea, excessive snoring or daytime drowsiness? : no    Reviewed and updated as needed this visit by clinical staff   Tobacco  Allergies  Meds              Reviewed and updated as needed this visit by Provider                 Social History     Tobacco Use     Smoking status: Never     Passive exposure: Current     Smokeless tobacco: Never   Substance Use Topics     Alcohol use: No             11/22/2023     1:11 PM   Alcohol Use   Prescreen: >3 drinks/day or >7 drinks/week? No     Do you have a current opioid prescription? No  Do you use any other controlled substances or medications that are not prescribed by a provider? None    Current providers sharing in care for this patient include:   Patient Care Team:  Elissa Meadows CNP as PCP - General (Nurse Practitioner - Gerontology)  Arlene as County Worker  Hesham Mullen MD as MD (Otolaryngology)  Ana Terrazas AuD as Audiologist (Audiology)  Elissa Meadows CNP as Assigned PCP  Hesham Mullen MD as Assigned Surgical  "Provider    The following health maintenance items are reviewed in Epic and correct as of today:  Health Maintenance   Topic Date Due     DEPRESSION ACTION PLAN  Never done     RSV VACCINE (Pregnancy & 60+) (1 - 1-dose 60+ series) Never done     PHQ-9  05/22/2024     ANNUAL REVIEW OF HM ORDERS  06/28/2024     MEDICARE ANNUAL WELLNESS VISIT  11/22/2024     FALL RISK ASSESSMENT  11/22/2024     DTAP/TDAP/TD IMMUNIZATION (3 - Td or Tdap) 07/24/2025     ADVANCE CARE PLANNING  11/22/2028     INFLUENZA VACCINE  Completed     Pneumococcal Vaccine: 65+ Years  Completed     ZOSTER IMMUNIZATION  Completed     COVID-19 Vaccine  Completed     IPV IMMUNIZATION  Aged Out     HPV IMMUNIZATION  Aged Out     MENINGITIS IMMUNIZATION  Aged Out     RSV MONOCLONAL ANTIBODY  Aged Out     Lab work is in process      Mammogram Screening - Patient over age 75, has elected to discontinue screenings.  Pertinent mammograms are reviewed under the imaging tab.    Review of Systems  Constitutional, HEENT, cardiovascular, pulmonary, GI, , musculoskeletal, neuro, skin, endocrine and psych systems are negative, except as otherwise noted.    OBJECTIVE:   BP (!) 162/100   Pulse 60   Temp 97.9  F (36.6  C)   Resp 18   Ht 1.651 m (5' 5\")   Wt 79.8 kg (176 lb)   LMP  (LMP Unknown)   SpO2 98%   BMI 29.29 kg/m   Estimated body mass index is 29.29 kg/m  as calculated from the following:    Height as of this encounter: 1.651 m (5' 5\").    Weight as of this encounter: 79.8 kg (176 lb).  Physical Exam  GENERAL APPEARANCE: healthy, alert and no distress  EYES: Eyes grossly normal to inspection  HENT: ear canals and TM's normal, nose and mouth without ulcers or lesions, oropharynx clear and oral mucous membranes moist  NECK: no adenopathy, no asymmetry, masses, or scars and thyroid normal to palpation  RESP: lungs clear to auscultation - no rales, rhonchi or wheezes  CV: regular rate and rhythm, normal S1 S2, no S3 or S4, no murmur, click or rub, no " peripheral edema  MS: Walks with a cane, gait is age appropriate without ataxia  SKIN: Bites are healing on both lower legs  NEURO: Normal strength and tone, sensory exam grossly normal, mentation intact and speech normal  PSYCH: mentation appears normal and affect normal/bright    Labs reviewed in Epic    ASSESSMENT / PLAN:   Vanda was seen today for wellness visit.    Diagnoses and all orders for this visit:    Medicare annual wellness visit, subsequent: Labs drawn today. She did not want an RSV shot.     Benign essential hypertension: Blood pressure today was 162/100, 160/102. Added in a second dose of Hydralazine. She will start checking blood pressures at home. Goals are under 150/90; greater than 100/60. She was asymptomatic today. Follow up in one month.   -     hydrALAZINE (APRESOLINE) 50 MG tablet; Take 1 tablet (50 mg) by mouth 2 times daily  -      Severe episode of recurrent major depressive disorder, without psychotic features (H): She continues on sertraline. Stable.     Stage 3a chronic kidney disease (H): Will recheck labs today.   -     Comprehensive metabolic panel (BMP + Alb, Alk Phos, ALT, AST, Total. Bili, TP); Future    Flea bite, subsequent encounter: Bilateral lower legs, are improving. Will order TMC cream for the itching.   -     triamcinolone (KENALOG) 0.1 % external cream; Apply topically 2 times daily        Patient has been advised of split billing requirements and indicates understanding: Yes      COUNSELING:  Reviewed preventive health counseling, as reflected in patient instructions  Special attention given to:       Regular exercise       Healthy diet/nutrition       Vision screening       Hearing screening       Dental care        She reports that she has never smoked. She has been exposed to tobacco smoke. She has never used smokeless tobacco.      Appropriate preventive services were discussed with this patient, including applicable screening as appropriate for fall prevention,  nutrition, physical activity, Tobacco-use cessation, weight loss and cognition.  Checklist reviewing preventive services available has been given to the patient.    Reviewed patients plan of care and provided an AVS. The Intermediate Care Plan ( asthma action plan, low back pain action plan, and migraine action plan) for Vanda meets the Care Plan requirement. This Care Plan has been established and reviewed with the Patient and other:granddaughter .          Elissa Meadows CNP  [facility]    Identified Health Risks:  I have reviewed Opioid Use Disorder and Substance Use Disorder risk factors and made any needed referrals.   Answers submitted by the patient for this visit:  Patient Health Questionnaire (Submitted on 11/22/2023)  If you checked off any problems, how difficult have these problems made it for you to do your work, take care of things at home, or get along with other people?: Not difficult at all  PHQ9 TOTAL SCORE: 0  Annual Preventive Visit (Submitted on 11/22/2023)  Chief Complaint: Annual Exam:

## 2023-11-22 NOTE — PATIENT INSTRUCTIONS
Increase your hydralazine dose to 50mg twice daily AM and PM.     Take your blood pressure one hour after medications, record readings. Goals are less than 140/90; greater than 100/60.    Bring readings with to follow up.    Triamcinolone twice daily to the lower legs x 14 days, or until healed, but not exceeding 14 days. No itching.    Labs are processing.    Recheck in 1 month.

## 2023-12-13 NOTE — PROGRESS NOTES
"History of Present Illness - Vanda Sanchez is a very pleasant 88 year old female here to see me in follow up from 7/25/2022, and was seen for the first time due to hearing loss.    She tells me that this all started in [facility] 2022 with a severe URI or allergy.  She started to get severe congestion in the nose, as well as her RIGHT ear.  She has known allergic rhinitis in the spring, so she waitied this out, but her ear on the RIGHT has stayed stuffy.  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    Previous audiology testing was done on 4/7/2021 at [place].  It was personally reviewed for today's exam and consult.  It showed a fairly flat moderate to severe sensorineural hearing loss in both ears.  No conductive hearing loss, tympanograms were normal.  Word recognition was normal on the LEFT, but down to 76% on the RIGHT    A new audiogram was done on 7/25/22.  The nerve line was stable on the LEFT, but the RIGHT side has had a threshold shift, and it appears to be a mixed loss. Tympanogram was also a flat curve on the RIGHT as well.  And therefore at that visit I performed a RIGHT myringotomy without tube and that helped.  She was lost to follow up until now.    She tells me that since seeing me a year ago, things were fine. She continues to use her hearing aid.  But then the \"RIGHT ear just went dead.\"  This was about 2 months ago.  She was not sick, but has allergies.    Past Medical History -   Patient Active Problem List   Diagnosis    HTN (hypertension)    DJD (degenerative joint disease)    Osteopenia    Dyslipidemia    Vitamin D deficiency    Pre-diabetes    Allergic rhinitis    S/P hip replacement    Obesity    CARDIOVASCULAR SCREENING; LDL GOAL LESS THAN 100    Accelerated hypertension    Benign neoplasm of parathyroid gland    Compression fracture of L3 vertebra (H)    Hypercalcemia    Memory " loss    S/P parathyroidectomy (H24)    Senile osteoporosis    Sensorineural hearing loss, bilateral    Bacterial meningitis       Current Medications -   Current Outpatient Medications:     chlorthalidone (HYGROTON) 25 MG tablet, Take 1 tablet (25 mg) by mouth daily for 14 days, Disp: 14 tablet, Rfl: 0    hydrALAZINE (APRESOLINE) 50 MG tablet, Take 1 tablet (50 mg) by mouth 2 times daily, Disp: 180 tablet, Rfl: 3    permethrin (ELIMITE) 5 % external cream, Apply cream from head to toe (except the face); leave on for 8-14 hours then wash off with water; reapply in 1 week if live mites appear., Disp: 60 g, Rfl: 1    sertraline (ZOLOFT) 25 MG tablet, Take 25 mg by mouth daily, Disp: , Rfl:     triamcinolone (KENALOG) 0.1 % external cream, Apply topically 2 times daily, Disp: 30 g, Rfl: 1    Allergies -   Allergies   Allergen Reactions    Atenolol Fatigue    Carvedilol Cramps    Clonidine Nausea and Vomiting    Enalapril     Hctz      She thinks it caused joint arthritis    Lisinopril     Losartan Potassium Swelling    Wheat     Amlodipine Besylate Rash       Social History -   Social History     Socioeconomic History    Marital status: Single   Tobacco Use    Smoking status: Never Smoker    Smokeless tobacco: Never Used   Substance and Sexual Activity    Alcohol use: No    Drug use: No    Sexual activity: Never   Other Topics Concern    Parent/sibling w/ CABG, MI or angioplasty before 65F 55M? No     Social Determinants of Health     Financial Resource Strain: Low Risk     Difficulty of Paying Living Expenses: Not very hard   Food Insecurity: No Food Insecurity    Worried About Running Out of Food in the Last Year: Never true    Ran Out of Food in the Last Year: Never true   Transportation Needs: No Transportation Needs    Lack of Transportation (Medical): No    Lack of Transportation (Non-Medical): No   Social Connections: Unknown    Frequency of Communication with Friends and Family: More than three times a week     Frequency of Social Gatherings with Friends and Family: More than three times a week       Family History -   Family History   Problem Relation Age of Onset    Heart Disease Mother     Lipids Brother     Cancer Mother     Diabetes Mother     Cancer Father     Heart Disease Father     Cancer Sister     Hypertension Sister     Cancer Brother     Heart Disease Brother     Cancer Maternal Grandmother        Review of Systems - As per HPI and PMHx, otherwise 10+ system review of the head and neck, and general constitution is negative.    Physical Exam  BP (!) 161/94   Pulse 89   Resp 16   Wt 79.8 kg (176 lb)   LMP  (LMP Unknown)   SpO2 97%   BMI 29.29 kg/m      General - The patient is well nourished and well developed, and appears to have good nutritional status.  Alert and oriented to person and place, answers questions and cooperates with examination appropriately.   Head and Face - Normocephalic and atraumatic, with no gross asymmetry noted of the contour of the facial features.  The facial nerve is intact, with strong symmetric movements.  Voice and Breathing - The patient was breathing comfortably without the use of accessory muscles. There was no wheezing, stridor, or stertor.  The patients voice was clear and strong, and had appropriate pitch and quality.  Ears - The LEFT tympanic membrane and canal were normal. The RIGHT tympanic membrane was retracted with an obvious dark yellow effusion.  Eyes - Extraocular movements intact, and the pupils were reactive to light.  Sclera were not icteric or injected, conjunctiva were pink and moist.  Mouth - Examination of the oral cavity showed pink, healthy oral mucosa. No lesions or ulcerations noted.  The tongue was mobile and midline, and the dentition were in good condition.    Throat - The walls of the oropharynx were smooth, pink, moist, symmetric, and had no lesions or ulcerations.  The tonsillar pillars and soft palate were symmetric.  The uvula was midline on  elevation.    Neck - Normal midline excursion of the laryngotracheal complex during swallowing.  Full range of motion on passive movement.  Palpation of the occipital, submental, submandibular, internal jugular chain, and supraclavicular nodes did not demonstrate any abnormal lymph nodes or masses.  The carotid pulse was palpable bilaterally.  Palpation of the thyroid was soft and smooth, with no nodules or goiter appreciated.  The trachea was mobile and midline.  Nose - External contour is symmetric, no gross deflection or scars.  Nasal mucosa is pink and moist with no abnormal mucus.  The septum was midline and non-obstructive, turbinates of normal size and position.  No polyps, masses, or purulence noted on examination.    Audiologic Studies - see above    Procedure - RIGHT  Myringotomy without tube    Procedure - After discussion of the risks and benefits of myringotomy, informed consent was signed and placed in the chart.  I began with the RIGHT side.  I proceeded to position the patient in a semi-supine position in the examination chair.  Using the binocular surgical microscope, I then proceeded to clean the canal of cerumen and squamous debris.  I was able to see the tympanic membrane.  Using a small cotton tipped applicator, I applied a tiny coating of phenol onto the tympanic membrane.  After visualizing a good pool, I then proceeded to use a myringotomy knife to make a radially oriented incision in the tympanic membrane.  A moderate amount of clear yellow effusion was suctioned away.        A/P - Vanda Sanchez is a 88 year old female  (H65.21) Right chronic serous otitis media  (primary encounter diagnosis)  (H69.83) Dysfunction of both eustachian tubes    I have performed RIGHT myringotomy without tube and that has immediately relieved the pressure.  I have counseled her on what to expect in terms of a bit of residual drainage from that RIGHT side.  Follow-up as needed

## 2023-12-21 ENCOUNTER — OFFICE VISIT (OUTPATIENT)
Dept: OTOLARYNGOLOGY | Facility: CLINIC | Age: 88
End: 2023-12-21
Payer: COMMERCIAL

## 2023-12-21 VITALS
DIASTOLIC BLOOD PRESSURE: 94 MMHG | SYSTOLIC BLOOD PRESSURE: 161 MMHG | HEART RATE: 89 BPM | RESPIRATION RATE: 16 BRPM | BODY MASS INDEX: 29.29 KG/M2 | WEIGHT: 176 LBS | OXYGEN SATURATION: 97 %

## 2023-12-21 DIAGNOSIS — H69.93 DYSFUNCTION OF BOTH EUSTACHIAN TUBES: Primary | ICD-10-CM

## 2023-12-21 DIAGNOSIS — H65.21 RIGHT CHRONIC SEROUS OTITIS MEDIA: ICD-10-CM

## 2023-12-21 PROCEDURE — 99213 OFFICE O/P EST LOW 20 MIN: CPT | Mod: 25 | Performed by: OTOLARYNGOLOGY

## 2023-12-21 PROCEDURE — 69420 INCISION OF EARDRUM: CPT | Mod: RT | Performed by: OTOLARYNGOLOGY

## 2023-12-21 ASSESSMENT — PAIN SCALES - GENERAL: PAINLEVEL: NO PAIN (0)

## 2023-12-21 NOTE — LETTER
"    12/21/2023         RE: Vanda Sanchez  [address]   [address]  [facility] [address]        Dear Colleague,    Thank you for referring your patient, Vanda Sanchez, to the [facility]. Please see a copy of my visit note below.    History of Present Illness - Vanda Sanchez is a very pleasant 88 year old female here to see me in follow up from 7/25/2022, and was seen for the first time due to hearing loss.    She tells me that this all started in [facility] 2022 with a severe URI or allergy.  She started to get severe congestion in the nose, as well as her RIGHT ear.  She has known allergic rhinitis in the spring, so she waitied this out, but her ear on the RIGHT has stayed stuffy.  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    Previous audiology testing was done on 4/7/2021 at [place].  It was personally reviewed for today's exam and consult.  It showed a fairly flat moderate to severe sensorineural hearing loss in both ears.  No conductive hearing loss, tympanograms were normal.  Word recognition was normal on the LEFT, but down to 76% on the RIGHT    A new audiogram was done on 7/25/22.  The nerve line was stable on the LEFT, but the RIGHT side has had a threshold shift, and it appears to be a mixed loss. Tympanogram was also a flat curve on the RIGHT as well.  And therefore at that visit I performed a RIGHT myringotomy without tube and that helped.  She was lost to follow up until now.    She tells me that since seeing me a year ago, things were fine. She continues to use her hearing aid.  But then the \"RIGHT ear just went dead.\"  This was about 2 months ago.  She was not sick, but has allergies.    Past Medical History -   Patient Active Problem List   Diagnosis     HTN (hypertension)     DJD (degenerative joint disease)     Osteopenia     Dyslipidemia     Vitamin D " deficiency     Pre-diabetes     Allergic rhinitis     S/P hip replacement     Obesity     CARDIOVASCULAR SCREENING; LDL GOAL LESS THAN 100     Accelerated hypertension     Benign neoplasm of parathyroid gland     Compression fracture of L3 vertebra (H)     Hypercalcemia     Memory loss     S/P parathyroidectomy (H24)     Senile osteoporosis     Sensorineural hearing loss, bilateral     Bacterial meningitis       Current Medications -   Current Outpatient Medications:      chlorthalidone (HYGROTON) 25 MG tablet, Take 1 tablet (25 mg) by mouth daily for 14 days, Disp: 14 tablet, Rfl: 0     hydrALAZINE (APRESOLINE) 50 MG tablet, Take 1 tablet (50 mg) by mouth 2 times daily, Disp: 180 tablet, Rfl: 3     permethrin (ELIMITE) 5 % external cream, Apply cream from head to toe (except the face); leave on for 8-14 hours then wash off with water; reapply in 1 week if live mites appear., Disp: 60 g, Rfl: 1     sertraline (ZOLOFT) 25 MG tablet, Take 25 mg by mouth daily, Disp: , Rfl:      triamcinolone (KENALOG) 0.1 % external cream, Apply topically 2 times daily, Disp: 30 g, Rfl: 1    Allergies -   Allergies   Allergen Reactions     Atenolol Fatigue     Carvedilol Cramps     Clonidine Nausea and Vomiting     Enalapril      Hctz      She thinks it caused joint arthritis     Lisinopril      Losartan Potassium Swelling     Wheat      Amlodipine Besylate Rash       Social History -   Social History     Socioeconomic History     Marital status: Single   Tobacco Use     Smoking status: Never Smoker     Smokeless tobacco: Never Used   Substance and Sexual Activity     Alcohol use: No     Drug use: No     Sexual activity: Never   Other Topics Concern     Parent/sibling w/ CABG, MI or angioplasty before 65F 55M? No     Social Determinants of Health     Financial Resource Strain: Low Risk      Difficulty of Paying Living Expenses: Not very hard   Food Insecurity: No Food Insecurity     Worried About Running Out of Food in the Last Year:  Never true     Ran Out of Food in the Last Year: Never true   Transportation Needs: No Transportation Needs     Lack of Transportation (Medical): No     Lack of Transportation (Non-Medical): No   Social Connections: Unknown     Frequency of Communication with Friends and Family: More than three times a week     Frequency of Social Gatherings with Friends and Family: More than three times a week       Family History -   Family History   Problem Relation Age of Onset     Heart Disease Mother      Lipids Brother      Cancer Mother      Diabetes Mother      Cancer Father      Heart Disease Father      Cancer Sister      Hypertension Sister      Cancer Brother      Heart Disease Brother      Cancer Maternal Grandmother        Review of Systems - As per HPI and PMHx, otherwise 10+ system review of the head and neck, and general constitution is negative.    Physical Exam  BP (!) 161/94   Pulse 89   Resp 16   Wt 79.8 kg (176 lb)   LMP  (LMP Unknown)   SpO2 97%   BMI 29.29 kg/m      General - The patient is well nourished and well developed, and appears to have good nutritional status.  Alert and oriented to person and place, answers questions and cooperates with examination appropriately.   Head and Face - Normocephalic and atraumatic, with no gross asymmetry noted of the contour of the facial features.  The facial nerve is intact, with strong symmetric movements.  Voice and Breathing - The patient was breathing comfortably without the use of accessory muscles. There was no wheezing, stridor, or stertor.  The patients voice was clear and strong, and had appropriate pitch and quality.  Ears - The LEFT tympanic membrane and canal were normal. The RIGHT tympanic membrane was retracted with an obvious dark yellow effusion.  Eyes - Extraocular movements intact, and the pupils were reactive to light.  Sclera were not icteric or injected, conjunctiva were pink and moist.  Mouth - Examination of the oral cavity showed pink,  healthy oral mucosa. No lesions or ulcerations noted.  The tongue was mobile and midline, and the dentition were in good condition.    Throat - The walls of the oropharynx were smooth, pink, moist, symmetric, and had no lesions or ulcerations.  The tonsillar pillars and soft palate were symmetric.  The uvula was midline on elevation.    Neck - Normal midline excursion of the laryngotracheal complex during swallowing.  Full range of motion on passive movement.  Palpation of the occipital, submental, submandibular, internal jugular chain, and supraclavicular nodes did not demonstrate any abnormal lymph nodes or masses.  The carotid pulse was palpable bilaterally.  Palpation of the thyroid was soft and smooth, with no nodules or goiter appreciated.  The trachea was mobile and midline.  Nose - External contour is symmetric, no gross deflection or scars.  Nasal mucosa is pink and moist with no abnormal mucus.  The septum was midline and non-obstructive, turbinates of normal size and position.  No polyps, masses, or purulence noted on examination.    Audiologic Studies - see above    Procedure - RIGHT  Myringotomy without tube    Procedure - After discussion of the risks and benefits of myringotomy, informed consent was signed and placed in the chart.  I began with the RIGHT side.  I proceeded to position the patient in a semi-supine position in the examination chair.  Using the binocular surgical microscope, I then proceeded to clean the canal of cerumen and squamous debris.  I was able to see the tympanic membrane.  Using a small cotton tipped applicator, I applied a tiny coating of phenol onto the tympanic membrane.  After visualizing a good pool, I then proceeded to use a myringotomy knife to make a radially oriented incision in the tympanic membrane.  A moderate amount of clear yellow effusion was suctioned away.        A/P - Vanda Sanchez is a 88 year old female  (H65.21) Right chronic serous otitis media   (primary encounter diagnosis)  (H69.83) Dysfunction of both eustachian tubes    I have performed RIGHT myringotomy without tube and that has immediately relieved the pressure.  I have counseled her on what to expect in terms of a bit of residual drainage from that RIGHT side.  Follow-up as needed       Again, thank you for allowing me to participate in the care of your patient.        Sincerely,        Hesham Mullen MD

## 2024-05-31 ENCOUNTER — OFFICE VISIT (OUTPATIENT)
Dept: INTERNAL MEDICINE | Facility: CLINIC | Age: 89
End: 2024-05-31
Payer: COMMERCIAL

## 2024-05-31 VITALS
WEIGHT: 188 LBS | DIASTOLIC BLOOD PRESSURE: 100 MMHG | HEIGHT: 65 IN | HEART RATE: 65 BPM | TEMPERATURE: 98.8 F | BODY MASS INDEX: 31.32 KG/M2 | RESPIRATION RATE: 16 BRPM | SYSTOLIC BLOOD PRESSURE: 170 MMHG | OXYGEN SATURATION: 97 %

## 2024-05-31 DIAGNOSIS — R09.81 CONGESTION OF PARANASAL SINUS: ICD-10-CM

## 2024-05-31 DIAGNOSIS — J30.9 ALLERGIC RHINITIS, UNSPECIFIED SEASONALITY, UNSPECIFIED TRIGGER: ICD-10-CM

## 2024-05-31 DIAGNOSIS — I10 UNCONTROLLED HYPERTENSION: Primary | ICD-10-CM

## 2024-05-31 PROCEDURE — G2211 COMPLEX E/M VISIT ADD ON: HCPCS | Performed by: INTERNAL MEDICINE

## 2024-05-31 PROCEDURE — 99214 OFFICE O/P EST MOD 30 MIN: CPT | Performed by: INTERNAL MEDICINE

## 2024-05-31 RX ORDER — MULTIVIT WITH MINERALS/LUTEIN
1000 TABLET ORAL 2 TIMES DAILY
COMMUNITY

## 2024-05-31 RX ORDER — CHLORTHALIDONE 25 MG/1
25 TABLET ORAL DAILY
Qty: 90 TABLET | Refills: 3 | Status: SHIPPED | OUTPATIENT
Start: 2024-05-31 | End: 2024-06-28

## 2024-05-31 RX ORDER — CETIRIZINE HYDROCHLORIDE 10 MG/1
10 TABLET ORAL DAILY
Qty: 30 TABLET | Refills: 11 | Status: SHIPPED | OUTPATIENT
Start: 2024-05-31

## 2024-05-31 ASSESSMENT — PATIENT HEALTH QUESTIONNAIRE - PHQ9
SUM OF ALL RESPONSES TO PHQ QUESTIONS 1-9: 1
SUM OF ALL RESPONSES TO PHQ QUESTIONS 1-9: 1
10. IF YOU CHECKED OFF ANY PROBLEMS, HOW DIFFICULT HAVE THESE PROBLEMS MADE IT FOR YOU TO DO YOUR WORK, TAKE CARE OF THINGS AT HOME, OR GET ALONG WITH OTHER PEOPLE: NOT DIFFICULT AT ALL

## 2024-05-31 NOTE — PATIENT INSTRUCTIONS
Discontinue diphenhydramine but begin cetirizine 10 mg daily which was sent to your pharmacy    Finish all 10 days of your antibiotic    Start taking new blood pressure medication chlorthalidone.

## 2024-06-28 ENCOUNTER — OFFICE VISIT (OUTPATIENT)
Dept: INTERNAL MEDICINE | Facility: CLINIC | Age: 89
End: 2024-06-28
Payer: COMMERCIAL

## 2024-06-28 VITALS
TEMPERATURE: 98 F | SYSTOLIC BLOOD PRESSURE: 207 MMHG | BODY MASS INDEX: 31.09 KG/M2 | HEIGHT: 65 IN | HEART RATE: 66 BPM | DIASTOLIC BLOOD PRESSURE: 85 MMHG | RESPIRATION RATE: 18 BRPM | OXYGEN SATURATION: 97 % | WEIGHT: 186.6 LBS

## 2024-06-28 DIAGNOSIS — H69.93 DYSFUNCTION OF BOTH EUSTACHIAN TUBES: ICD-10-CM

## 2024-06-28 DIAGNOSIS — E78.00 HYPERCHOLESTEREMIA: ICD-10-CM

## 2024-06-28 DIAGNOSIS — R01.1 HEART MURMUR: ICD-10-CM

## 2024-06-28 DIAGNOSIS — N18.31 STAGE 3A CHRONIC KIDNEY DISEASE (H): ICD-10-CM

## 2024-06-28 DIAGNOSIS — D64.9 ANEMIA, UNSPECIFIED TYPE: ICD-10-CM

## 2024-06-28 DIAGNOSIS — R73.03 PREDIABETES: ICD-10-CM

## 2024-06-28 DIAGNOSIS — I10 UNCONTROLLED HYPERTENSION: Primary | ICD-10-CM

## 2024-06-28 DIAGNOSIS — F33.2 SEVERE EPISODE OF RECURRENT MAJOR DEPRESSIVE DISORDER, WITHOUT PSYCHOTIC FEATURES (H): ICD-10-CM

## 2024-06-28 LAB
ANION GAP SERPL CALCULATED.3IONS-SCNC: 9 MMOL/L (ref 7–15)
BASOPHILS # BLD AUTO: 0 10E3/UL (ref 0–0.2)
BASOPHILS NFR BLD AUTO: 0 %
BUN SERPL-MCNC: 24.4 MG/DL (ref 8–23)
CALCIUM SERPL-MCNC: 9.4 MG/DL (ref 8.8–10.2)
CHLORIDE SERPL-SCNC: 104 MMOL/L (ref 98–107)
CHOLEST SERPL-MCNC: 172 MG/DL
CREAT SERPL-MCNC: 0.97 MG/DL (ref 0.51–0.95)
CREAT UR-MCNC: 61.7 MG/DL
DEPRECATED HCO3 PLAS-SCNC: 27 MMOL/L (ref 22–29)
EGFRCR SERPLBLD CKD-EPI 2021: 56 ML/MIN/1.73M2
EOSINOPHIL # BLD AUTO: 0.2 10E3/UL (ref 0–0.7)
EOSINOPHIL NFR BLD AUTO: 3 %
ERYTHROCYTE [DISTWIDTH] IN BLOOD BY AUTOMATED COUNT: 14.4 % (ref 10–15)
FASTING STATUS PATIENT QL REPORTED: NO
FASTING STATUS PATIENT QL REPORTED: NO
FERRITIN SERPL-MCNC: 84 NG/ML (ref 11–328)
GLUCOSE SERPL-MCNC: 123 MG/DL (ref 70–99)
HBA1C MFR BLD: 5.9 % (ref 0–5.6)
HCT VFR BLD AUTO: 37.4 % (ref 35–47)
HDLC SERPL-MCNC: 45 MG/DL
HGB BLD-MCNC: 12.2 G/DL (ref 11.7–15.7)
IMM GRANULOCYTES # BLD: 0 10E3/UL
IMM GRANULOCYTES NFR BLD: 0 %
IRON BINDING CAPACITY (ROCHE): 292 UG/DL (ref 240–430)
IRON SATN MFR SERPL: 13 % (ref 15–46)
IRON SERPL-MCNC: 39 UG/DL (ref 37–145)
LDLC SERPL CALC-MCNC: 102 MG/DL
LYMPHOCYTES # BLD AUTO: 1 10E3/UL (ref 0.8–5.3)
LYMPHOCYTES NFR BLD AUTO: 20 %
MCH RBC QN AUTO: 28.4 PG (ref 26.5–33)
MCHC RBC AUTO-ENTMCNC: 32.6 G/DL (ref 31.5–36.5)
MCV RBC AUTO: 87 FL (ref 78–100)
MICROALBUMIN UR-MCNC: <12 MG/L
MICROALBUMIN/CREAT UR: NORMAL MG/G{CREAT}
MONOCYTES # BLD AUTO: 0.3 10E3/UL (ref 0–1.3)
MONOCYTES NFR BLD AUTO: 7 %
NEUTROPHILS # BLD AUTO: 3.5 10E3/UL (ref 1.6–8.3)
NEUTROPHILS NFR BLD AUTO: 70 %
NONHDLC SERPL-MCNC: 127 MG/DL
PLATELET # BLD AUTO: 135 10E3/UL (ref 150–450)
POTASSIUM SERPL-SCNC: 3.6 MMOL/L (ref 3.4–5.3)
RBC # BLD AUTO: 4.3 10E6/UL (ref 3.8–5.2)
SODIUM SERPL-SCNC: 140 MMOL/L (ref 135–145)
TRIGL SERPL-MCNC: 127 MG/DL
WBC # BLD AUTO: 5 10E3/UL (ref 4–11)

## 2024-06-28 PROCEDURE — 85025 COMPLETE CBC W/AUTO DIFF WBC: CPT | Performed by: NURSE PRACTITIONER

## 2024-06-28 PROCEDURE — 83550 IRON BINDING TEST: CPT | Performed by: NURSE PRACTITIONER

## 2024-06-28 PROCEDURE — 82570 ASSAY OF URINE CREATININE: CPT | Performed by: NURSE PRACTITIONER

## 2024-06-28 PROCEDURE — 36415 COLL VENOUS BLD VENIPUNCTURE: CPT | Performed by: NURSE PRACTITIONER

## 2024-06-28 PROCEDURE — 80061 LIPID PANEL: CPT | Performed by: NURSE PRACTITIONER

## 2024-06-28 PROCEDURE — 82043 UR ALBUMIN QUANTITATIVE: CPT | Performed by: NURSE PRACTITIONER

## 2024-06-28 PROCEDURE — 80048 BASIC METABOLIC PNL TOTAL CA: CPT | Performed by: NURSE PRACTITIONER

## 2024-06-28 PROCEDURE — 83036 HEMOGLOBIN GLYCOSYLATED A1C: CPT | Performed by: NURSE PRACTITIONER

## 2024-06-28 PROCEDURE — 99214 OFFICE O/P EST MOD 30 MIN: CPT | Performed by: NURSE PRACTITIONER

## 2024-06-28 PROCEDURE — 83540 ASSAY OF IRON: CPT | Performed by: NURSE PRACTITIONER

## 2024-06-28 PROCEDURE — G2211 COMPLEX E/M VISIT ADD ON: HCPCS | Performed by: NURSE PRACTITIONER

## 2024-06-28 PROCEDURE — 82728 ASSAY OF FERRITIN: CPT | Performed by: NURSE PRACTITIONER

## 2024-06-28 RX ORDER — RESPIRATORY SYNCYTIAL VIRUS VACCINE 120MCG/0.5
0.5 KIT INTRAMUSCULAR ONCE
Qty: 1 EACH | Refills: 0 | Status: CANCELLED | OUTPATIENT
Start: 2024-06-28 | End: 2024-06-28

## 2024-06-28 RX ORDER — CHLORTHALIDONE 25 MG/1
50 TABLET ORAL DAILY
Status: SHIPPED
Start: 2024-06-28 | End: 2024-08-21

## 2024-06-28 RX ORDER — FLUTICASONE PROPIONATE 50 MCG
1 SPRAY, SUSPENSION (ML) NASAL DAILY
Qty: 16 G | Refills: 3 | Status: SHIPPED | OUTPATIENT
Start: 2024-06-28

## 2024-06-28 NOTE — PROGRESS NOTES
Assessment & Plan     Uncontrolled hypertension  Her blood pressure remains uncontrolled while on the chlorthalidone 25 mg tablet.  She has significant allergies related to other blood pressure medications.  At this time we will increase her chlorthalidone to 50 mg daily.  However anticipate that she will likely need an additional blood pressure medication added on to help provide adequate control of her blood pressure.  I recommend she check her blood pressure at home daily and write these numbers down and bring them with her to her next appointment.  - Basic metabolic panel  (Ca, Cl, CO2, Creat, Gluc, K, Na, BUN); Future  - chlorthalidone (HYGROTON) 25 MG tablet; Take 2 tablets (50 mg) by mouth daily  - Echocardiogram Complete; Future  - Adult Cardiology Eval  Referral; Future    Heart murmur  Very slight systolic murmur noted on exam today.  I discussed with patient this finding which she states she has never been told she has had a murmur before.  At this time I will order an echocardiogram as this murmur appears to be new for the patient.  Referral for cardiology placed for further evaluation of this murmur as well in addition to her uncontrolled hypertension.  - Echocardiogram Complete; Future  - Adult Cardiology Eval  Referral; Future    Anemia, unspecified type  Previous labs shows findings of anemia.  Will recheck this at this time and additional labs to determine whether this is iron deficiency anemia.  Patient may benefit from an iron supplement depending on results.  - CBC with platelets and differential; Future  - Iron and iron binding capacity; Future  - Ferritin; Future    Stage 3a chronic kidney disease (H)  Has been stable with a creatinine around 1-1.07 over the past year.  GFR also stable around 53  - Basic metabolic panel  (Ca, Cl, CO2, Creat, Gluc, K, Na, BUN); Future  - Albumin Random Urine Quantitative with Creat Ratio; Future    Hypercholesteremia    - Lipid panel reflex  "to direct LDL Non-fasting; Future    Dysfunction of both eustachian tubes  Has ongoing concerns with her ears.  Has been taking the sertraline 10 mg daily without significant improvement therefore I will add on Flonase.  Reviewed use of this medication.  No findings of physical exam to indicate need for antibiotics.  - fluticasone (FLONASE) 50 MCG/ACT nasal spray; Spray 1 spray into both nostrils daily      Prediabetes    - Hemoglobin A1c; Future    Severe episode of recurrent major depressive disorder, without psychotic features (H)  Patient states that she feels great.  Is no longer taking the Zoloft since leaving the nursing home.  States that she feels much better being back at home and no longer in the nursing home.  Denies any concerns with uncontrolled depression or anxiety.  Does not want to be on any medication.     The longitudinal plan of care for the diagnosis(es)/condition(s) as documented were addressed during this visit. Due to the added complexity in care, I will continue to support Vanda in the subsequent management and with ongoing continuity of care.      BMI  Estimated body mass index is 31.05 kg/m  as calculated from the following:    Height as of this encounter: 1.651 m (5' 5\").    Weight as of this encounter: 84.6 kg (186 lb 9.6 oz).     Subjective   Vanda is a 88 year old, presenting for the following health issues:  Hypertension (One month follow up), Establish Care, and Allergies (Allergies \"about the same\" since starting Zyrtec)  Here today to follow-up regarding her hypertension.  She was seen in the ER for ear problems and there she was noted to have hypertension.  She then followed up in clinic on 5/31/2024.  At that time she was started on chlorthalidone 25 mg daily for hypertension.  She states she checks her blood pressure at home about once daily.  States has been running in the 190s to 200 systolic.  States the diastolic typically stays around 90.  Denies any headaches, " "dizziness, visual changes or chest pain.  States she has had issues with different blood pressure medications in the past.  She believes she was on 1 that might of offered better improvement of her blood pressure but states her blood pressure has never been normal to her.    Also reports concerns with ongoing ear discomfort.  She was seen in the ER for this on 5/24/2024.  She was started on Augmentin.  States she continues to have some nasal congestion and feeling like there is fluid in her ears.  Has been afebrile.  No ear pain at this time.      6/28/2024     9:42 AM   Additional Questions   Roomed by Nazia JORDAN   Accompanied by staff     History of Present Illness       Hypertension: She presents for follow up of hypertension.  She does check blood pressure  regularly outside of the clinic. Outside blood pressures have been over 140/90. She does not follow a low salt diet.     She eats 2-3 servings of fruits and vegetables daily.She consumes 1 sweetened beverage(s) daily.She exercises with enough effort to increase her heart rate 9 or less minutes per day.  She exercises with enough effort to increase her heart rate 3 or less days per week.   She is taking medications regularly.         ROS  Comprehensive 12-point review of systems was completed and negative except as noted in HPI.        Objective    BP (!) 207/85 (BP Location: Right arm, Patient Position: Sitting, Cuff Size: Adult Regular)   Pulse 66   Temp 98  F (36.7  C) (Oral)   Resp 18   Ht 1.651 m (5' 5\")   Wt 84.6 kg (186 lb 9.6 oz)   LMP  (LMP Unknown)   SpO2 97%   BMI 31.05 kg/m    Body mass index is 31.05 kg/m .  Physical Exam   Constitutional: In no acute distress.  Clean appearance.  Ears: TMs without erythema or effusions.  Ear canals normal.  No cerumen impaction.  Cardiovascular: Regular rate and rhythm.  Slight systolic murmur noted.  No peripheral edema.  Respiratory: Normal respiratory effort.  Lung sounds clear throughout on " auscultation.  Skin: Skin is pink, warm and dry.     Musculoskeletal: Gait normal.  Able to mount exam table without difficulties.  Psychiatric: Appropriate affect and demeanor.  Memory intact.  Good insight and judgment.  Neurologic: No tremor or involuntary movement noted.          Signed Electronically by: Imelda Rivers NP

## 2024-06-28 NOTE — PATIENT INSTRUCTIONS
For your blood pressure.  This is still not controlled.  We will increase her chlorthalidone to 50 mg daily.  You have 25 mg tablets at home so I will have you take 2 tablets daily at the same time.  We will recheck your blood pressure and response this medication in 2 to 3 weeks when I see you in clinic.      I want you to check your blood pressure at least once daily.  Write this down.  Bring the readings with you to the next visit.  If you have a blood pressure reading with a systolic greater than 210 or diastolic greater than 110 or you develop severe headache, confusion or dizziness I recommend going to the ER immediately    I want you to bring all your medication bottles and any supplements or over-the-counter medication bottles that you are taking with you to next appointment.    I did hear a slight murmur today.  Due to this being new for you we will get an echocardiogram.  This was ordered.  They should call you to schedule this appointment however if you would like to call to schedule this you can call [phone].    I placed a referral for cardiology.  They will help to evaluate the murmur that is noted today but also with your uncontrolled blood pressure.      For your ongoing ear symptoms this is related to what is called eustachian tube dysfunction.  Usually this is secondary to untreated allergies.  Continue on the Zyrtec (cetirizine)  10 mg tablet daily.  However I added on Flonase (fluticasone) nasal spray that I want you to use once daily.

## 2024-07-15 ENCOUNTER — ANCILLARY PROCEDURE (OUTPATIENT)
Dept: CARDIOLOGY | Facility: CLINIC | Age: 89
End: 2024-07-15
Attending: NURSE PRACTITIONER
Payer: COMMERCIAL

## 2024-07-15 DIAGNOSIS — I10 UNCONTROLLED HYPERTENSION: ICD-10-CM

## 2024-07-15 DIAGNOSIS — R01.1 HEART MURMUR: ICD-10-CM

## 2024-07-15 LAB — LVEF ECHO: NORMAL

## 2024-07-15 PROCEDURE — 93306 TTE W/DOPPLER COMPLETE: CPT | Performed by: INTERNAL MEDICINE

## 2024-07-16 ENCOUNTER — OFFICE VISIT (OUTPATIENT)
Dept: FAMILY MEDICINE | Facility: CLINIC | Age: 89
End: 2024-07-16
Payer: COMMERCIAL

## 2024-07-16 ENCOUNTER — HOSPITAL ENCOUNTER (EMERGENCY)
Facility: CLINIC | Age: 89
Discharge: HOME | End: 2024-07-16
Attending: EMERGENCY MEDICINE | Admitting: EMERGENCY MEDICINE
Payer: COMMERCIAL

## 2024-07-16 ENCOUNTER — NURSE TRIAGE (OUTPATIENT)
Dept: INTERNAL MEDICINE | Facility: CLINIC | Age: 89
End: 2024-07-16
Payer: COMMERCIAL

## 2024-07-16 VITALS
OXYGEN SATURATION: 97 % | SYSTOLIC BLOOD PRESSURE: 211 MMHG | HEART RATE: 74 BPM | RESPIRATION RATE: 18 BRPM | DIASTOLIC BLOOD PRESSURE: 105 MMHG | TEMPERATURE: 97.9 F

## 2024-07-16 VITALS
HEIGHT: 65 IN | RESPIRATION RATE: 18 BRPM | OXYGEN SATURATION: 97 % | DIASTOLIC BLOOD PRESSURE: 118 MMHG | TEMPERATURE: 97.9 F | WEIGHT: 185 LBS | BODY MASS INDEX: 30.82 KG/M2 | SYSTOLIC BLOOD PRESSURE: 226 MMHG | HEART RATE: 78 BPM

## 2024-07-16 DIAGNOSIS — I10 ASYMPTOMATIC HYPERTENSION: ICD-10-CM

## 2024-07-16 DIAGNOSIS — R42 DIZZINESS: Primary | ICD-10-CM

## 2024-07-16 DIAGNOSIS — I1A.0 RESISTANT HYPERTENSION: ICD-10-CM

## 2024-07-16 DIAGNOSIS — R73.03 PRE-DIABETES: ICD-10-CM

## 2024-07-16 DIAGNOSIS — N18.31 STAGE 3A CHRONIC KIDNEY DISEASE (H): ICD-10-CM

## 2024-07-16 PROCEDURE — 99282 EMERGENCY DEPT VISIT SF MDM: CPT

## 2024-07-16 PROCEDURE — 99214 OFFICE O/P EST MOD 30 MIN: CPT | Performed by: PHYSICIAN ASSISTANT

## 2024-07-16 ASSESSMENT — COLUMBIA-SUICIDE SEVERITY RATING SCALE - C-SSRS
6. HAVE YOU EVER DONE ANYTHING, STARTED TO DO ANYTHING, OR PREPARED TO DO ANYTHING TO END YOUR LIFE?: NO
1. IN THE PAST MONTH, HAVE YOU WISHED YOU WERE DEAD OR WISHED YOU COULD GO TO SLEEP AND NOT WAKE UP?: NO
2. HAVE YOU ACTUALLY HAD ANY THOUGHTS OF KILLING YOURSELF IN THE PAST MONTH?: NO

## 2024-07-16 ASSESSMENT — ACTIVITIES OF DAILY LIVING (ADL): ADLS_ACUITY_SCORE: 33

## 2024-07-16 NOTE — TELEPHONE ENCOUNTER
Nurse Triage SBAR    Is this a 2nd Level Triage? NO    S-(situation): Dizziness, off on BP medication for 2 days, does not want to go back on medication due to dizziness.      B-(background): LOV 6/28/24     Uncontrolled hypertension  Her blood pressure remains uncontrolled while on the chlorthalidone 25 mg tablet.  She has significant allergies related to other blood pressure medications.  At this time we will increase her chlorthalidone to 50 mg daily.  However anticipate that she will likely need an additional blood pressure medication added on to help provide adequate control of her blood pressure.  I recommend she check her blood pressure at home daily and write these numbers down and bring them with her to her next appointment.    A-(assessment): Update: New blood pressure medication is making her very dizzy and she stopped taking it.     Discontinue medication yesterday 7/15 when she has an appointment.   Sunday /75, other readings were 204/99, 174/91     Current /94 HR 87   Dizziness improved since she stopped taking chlorthalidone.   She is able to go to grocery today.   Does not want to go back on medication since it is making her dizzy.     Patient had a very hard time hearing writer, retry calling. Hard to gather information for pt.   She did confirm that she will find a ride to come to walk in care in [facility].     Denies chest pain or breathing difficulty.     Protocol Recommended Disposition:   See in Office Today    R-(recommendations):  care advise reviewed. Red flags reviewed.   Advise to be seen at walk in care today since BP reading is high with dizziness and there is no opening with a provider.   Pt agree to plan. Will call someone for a ride to bring her into  today.     Routed to provider    Does the patient meet one of the following criteria for ADS visit consideration? 16+ years old, with an [facility] PCP     TIP  Providers, please consider if this condition is appropriate for  management at one of our Acute and Diagnostic Services sites.     If patient is a good candidate, please use dotphrase <dot>triageresponse and select Refer to ADS to document.     Reason for Disposition   Systolic BP >= 180 OR Diastolic >= 110    Additional Information   Negative: Sounds like a life-threatening emergency to the triager   Negative: Symptom is main concern (e.g., headache, chest pain)   Negative: Low blood pressure is main concern   Negative: Systolic BP >= 160 OR Diastolic >= 100, and any cardiac (e.g., breathing difficulty, chest pain) or neurologic symptoms (e.g., new-onset blurred or double vision)   Negative: Pregnant 20 or more weeks (or postpartum < 6 weeks) with new hand or face swelling   Negative: Pregnant 20 or more weeks (or postpartum < 6 weeks) and Systolic BP >= 160 OR Diastolic >= 110   Negative: Patient sounds very sick or weak to the triager   Negative: Systolic BP >= 200 OR Diastolic >= 120 and having NO cardiac or neurologic symptoms   Negative: Pregnant 20 or more weeks (or postpartum < 6 weeks) with Systolic BP >= 140 OR Diastolic >= 90   Negative: Systolic BP >= 180 OR Diastolic >= 110, and missed most recent dose of blood pressure medication    Protocols used: Blood Pressure - High-A-OH    Kev WYATT RN

## 2024-07-16 NOTE — DISCHARGE INSTRUCTIONS
You were seen in the emergency department for elevated blood pressure without any associated symptoms. We did not recommend any testing in the emergency department as you are asymptomatic. As we discussed, we need to have you contact your primary clinic as soon as possible to arrange for restarting blood pressure regimen that you can tolerate.  If you are able to, restarting your hydralazine would be a good first step.  You can follow-up your blood pressure trend daily. Please call your clinic today or tomorrow to try to set up follow-up.

## 2024-07-16 NOTE — TELEPHONE ENCOUNTER
Outgoing call to patient, very hard to hear her. She found a ride and will come to walk in care, and they can figure out what the plan will be.

## 2024-07-16 NOTE — ED PROVIDER NOTES
EMERGENCY DEPARTMENT ENCOUNTER      NAME: Vanda Sanchez  AGE: 88 year old female  YOB: 1935  MRN: [id number]  EVALUATION DATE & TIME: No admission date for patient encounter.    PCP: Imelda Rivers    ED PROVIDER: Cam Hamm M.D.      Chief Complaint   Patient presents with    Hypertension         FINAL IMPRESSION:  1. Asymptomatic hypertension          ED COURSE & MEDICAL DECISION MAKIN year old female presents to the Emergency Department for evaluation of elevated blood pressure.  Patient presents to the emergency department due to elevated blood pressure readings at home.  She reports multiple intolerances to other antihypertensives including most recently has been placed on hydralazine but says that this makes her dizzy.  She discontinued this prior to apparently completing an echocardiogram yesterday which looked stable although patient unaware of results.  She is hypertensive here but does not have any other associated symptoms.  Specifically no report of weakness, difficulty walking, confusion, chest pain, etc.  I think she meets any reasonable definition of asymptomatic hypertension and further workup in the emergency department would not be warranted at this time.  Patient is in agreement with this approach and eager to discharge shortly after her initial evaluation.  Did stressed the importance of clinic follow-up as she will certainly need to get restarted  on any antihypertensive regimen that she finds tolerable.  If able to tolerate it encouraged her to resume her hydralazine for now.  Patient is going to think about this.  She was discharged in stable condition and will contact her clinic for follow-up.    At the conclusion of the encounter I discussed the results of all of the tests and the disposition. The questions were answered. The patient or family acknowledged understanding and was agreeable with the care plan.       Medical Decision Making  Obtained  supplemental history:Supplemental history obtained?: No  Reviewed external records: External records reviewed?: No  Care impacted by chronic illness:Chronic Kidney Disease, Hyperlipidemia, Hypertension, and Mental Health  Care significantly affected by social determinants of health:N/A  Did you consider but not order tests?: Work up considered but not performed and documented in chart, if applicable  Did you interpret images independently?: Independent interpretation of ECG and images noted in documentation, when applicable.  Consultation discussion with other provider:Did you involve another provider (consultant, , pharmacy, etc.)?: No  Discharge. No recommendations on prescription strength medication(s). N/A.    4:41 PM I met the patient and performed my initial interview and exam. We discussed plan for discharge and all questions were answered.    MEDICATIONS GIVEN IN THE EMERGENCY:  Medications - No data to display    NEW PRESCRIPTIONS STARTED AT TODAY'S ER VISIT  New Prescriptions    No medications on file          =================================================================    HPI    Patient information was obtained from: patient    Use of : N/A       Vanda Sanchez is a 88 year old female with a pertinent history of hypertension, hyperlipidemia, CKD stage 3, who presents to this ED via walk-in for evaluation of hypertension.    The patient has hypertension, and has been having high blood pressure upwards of systolic 200 for years now. 3 days ago she stopped use of chlorthalidone after it worsened her baseline dizziness/balance issues. Today, the patient was feeling fine, but read her blood pressure and it was in the 180s. She presented to her primary care provider and had a blood pressure reading over 200, and was sent to the ER.    The patient denies any physical symptoms. Her primary care provider is Imelda Tita.    Per chart review, the patient presented to [facility] on  16-Jul-2024 for evaluation of hypertension. Patient stopped use of chlorthalidone recently due to dizziness side effect. Blood pressure still uncontrolled. Reading of 211/105 in office.    REVIEW OF SYSTEMS   All systems reviewed and negative except as noted in HPI.    PAST MEDICAL HISTORY:  Past Medical History:   Diagnosis Date    Allergic rhinitis 12/11/2012    Arthritis     Disease of thyroid gland     DJD (degenerative joint disease) 12/11/2012    Dyslipidemia 12/11/2012    History of transfusion     HTN (hypertension) 12/11/2012    Hypertension     Obesity 12/12/2012    Osteopenia 12/11/2012    Pre-diabetes 12/11/2012    S/P total hip arthroplasty     Vitamin D deficiency 12/11/2012       PAST SURGICAL HISTORY:  Past Surgical History:   Procedure Laterality Date    APPENDECTOMY      EYE SURGERY      cataract bilateral    JOINT REPLACEMENT      left    TONSILLECTOMY      ZZC TOTAL HIP ARTHROPLASTY      Description: Total Hip Replacement;  Recorded: 01/17/2013;           CURRENT MEDICATIONS:    No current facility-administered medications for this encounter.     Current Outpatient Medications   Medication Sig Dispense Refill    cetirizine (ZYRTEC) 10 MG tablet Take 1 tablet (10 mg) by mouth daily (Patient not taking: Reported on 7/16/2024) 30 tablet 11    chlorthalidone (HYGROTON) 25 MG tablet Take 2 tablets (50 mg) by mouth daily (Patient not taking: Reported on 7/16/2024)      fluticasone (FLONASE) 50 MCG/ACT nasal spray Spray 1 spray into both nostrils daily (Patient not taking: Reported on 7/16/2024) 16 g 3    hydrALAZINE (APRESOLINE) 50 MG tablet Take 1 tablet (50 mg) by mouth 2 times daily (Patient not taking: Reported on 6/28/2024) 180 tablet 3    vitamin C (ASCORBIC ACID) 1000 MG TABS Take 1,000 mg by mouth 2 times daily (Patient not taking: Reported on 7/16/2024)           ALLERGIES:  Allergies   Allergen Reactions    Atenolol Fatigue    Carvedilol Cramps    Clonidine Nausea and Vomiting    Enalapril      Hctz      She thinks it caused joint arthritis    Lisinopril     Losartan Potassium Swelling    Wheat     Amlodipine Besylate Rash       FAMILY HISTORY:  Family History   Problem Relation Age of Onset    Heart Disease Mother     Lipids Brother     Cancer Mother     Diabetes Mother     Cancer Father     Heart Disease Father     Cancer Sister     Hypertension Sister     Cancer Brother     Heart Disease Brother     Cancer Maternal Grandmother        SOCIAL HISTORY:   Social History     Socioeconomic History    Marital status: Single   Tobacco Use    Smoking status: Never     Passive exposure: Current    Smokeless tobacco: Never   Vaping Use    Vaping status: Never Used   Substance and Sexual Activity    Alcohol use: No    Drug use: No    Sexual activity: Never   Other Topics Concern    Parent/sibling w/ CABG, MI or angioplasty before 65F 55M? No     Social Determinants of Health     Financial Resource Strain: Unknown (11/22/2023)    Financial Resource Strain     Within the past 12 months, have you or your family members you live with been unable to get utilities (heat, electricity) when it was really needed?: Patient refused   Food Insecurity: Unknown (11/22/2023)    Food Insecurity     Within the past 12 months, did you worry that your food would run out before you got money to buy more?: Patient refused     Within the past 12 months, did the food you bought just not last and you didn t have money to get more?: Patient refused   Transportation Needs: Unknown (11/22/2023)    Transportation Needs     Within the past 12 months, has lack of transportation kept you from medical appointments, getting your medicines, non-medical meetings or appointments, work, or from getting things that you need?: Patient refused    Received from [facility], [facility]    Social Connections   Interpersonal Safety: Low Risk  (5/31/2024)    Interpersonal Safety     Do you  "feel physically and emotionally safe where you currently live?: Yes     Within the past 12 months, have you been hit, slapped, kicked or otherwise physically hurt by someone?: No     Within the past 12 months, have you been humiliated or emotionally abused in other ways by your partner or ex-partner?: No   Housing Stability: Unknown (11/22/2023)    Housing Stability     Do you have housing? : Patient refused     Are you worried about losing your housing?: Patient refused       VITALS:  BP (!) 226/118   Pulse 78   Temp 97.9  F (36.6  C) (Temporal)   Resp 18   Ht 1.651 m (5' 5\")   Wt 83.9 kg (185 lb)   LMP  (LMP Unknown)   SpO2 97%   BMI 30.79 kg/m      PHYSICAL EXAM    Constitutional: Well developed, Well nourished, NAD.  HENT: Normocephalic, Atraumatic. Neck Supple.  Eyes: EOMI, Conjunctiva normal.  Respiratory: Breathing comfortably on room air. Speaks full sentences easily. Lungs clear to ascultation.  Cardiovascular: Normal heart rate, Regular rhythm. No peripheral edema.  Abdomen: Soft  Musculoskeletal: Good range of motion in all major joints. No major deformities noted.  Integument: Warm, Dry.  Neurologic: Alert & awake, Normal motor function, Normal sensory function, No focal deficits noted.   Psychiatric: Cooperative. Affect appropriate.         I, Chance Tubbs, am serving as a scribe to document services personally performed by Dr. Cam Hamm, based on my observation and the provider's statements to me. I, Cam Hamm MD attest that Chance Tubbs is acting in a scribe capacity, has observed my performance of the services and has documented them in accordance with my direction.    Cam Hamm M.D.  Emergency Medicine  [facility] EMERGENCY ROOM  [address] [address] [phone]  Dept: [phone]       Cam Hamm MD  07/16/24 1710    "

## 2024-07-16 NOTE — ED TRIAGE NOTES
Pt told to come to the ED by nurse triage line for HTN. Pt recently stopped BP meds because she did not like the side effects and has noticed her BP rising for several days. Denies all symptoms. States she feels normal and only came in because she was told to do so.     Triage Assessment (Adult)       Row Name 07/16/24 6450          Triage Assessment    Airway WDL WDL        Respiratory WDL    Respiratory WDL WDL        Skin Circulation/Temperature WDL    Skin Circulation/Temperature WDL WDL        Cardiac WDL    Cardiac WDL X  HTN        Peripheral/Neurovascular WDL    Peripheral Neurovascular WDL WDL        Cognitive/Neuro/Behavioral WDL    Cognitive/Neuro/Behavioral WDL WDL

## 2024-07-16 NOTE — PROGRESS NOTES
Patient presents with:  Hypertension: States stopped her BP medication yesterday because it was making her dizzy  states had some heart tests yesterday because  BP medications give her side effects      Clinical Decision Making:  Patient has a history of hypertension and is now currently symptomatic with dizziness and markedly elevated blood pressure both diastolic and systolic with a history of chronic kidney disease and uncontrolled hypertension.  Patient is sent to next higher level of care at the emergency room for EKG and troponin levels to ensure there is no acute coronary syndrome.  Patient did have an echocardiogram yesterday and also has scheduled follow-up with cardiology on 8/7/2024.  Questions were answered to patient's satisfaction before discharge and she will have a home nurse aide help transport her to the emergency room for definitive evaluation and treatment.      ICD-10-CM    1. Dizziness  R42       2. Resistant hypertension  I1A.0       3. Pre-diabetes  R73.03       4. Stage 3a chronic kidney disease (H)  N18.31           HPI:  Vanda Sanchez is a 88 year old female who has a past medical history for uncontrolled hypertension, stage III chronic kidney disease, prediabetes who presents today for dizziness and markedly elevated blood pressure.  Patient has had a headache accompanying the dizziness has had some balance deficits and ambulates with a wheeled walker, has had no vision changes, weakness, chest arm or jaw pain, diaphoresis, shortness of breath, nausea or vomiting or abdominal pain.  Patient was recently seen yesterday for echocardiogram that showed early diastolic dysfunction, trace mitral and trace tricuspid valve insufficiency.  Patient is scheduled for cardiology follow-up on 8/7/2024.  However, the patient stopped her blood pressure medication yesterday and today she did not take the medication.  She felt dizzy and had markedly elevated blood pressure with pressure noted at 211/105  in the office currently.    History obtained from chart review and the patient.    Problem List:  2024-06: Severe episode of recurrent major depressive disorder,   without psychotic features (H)  2024-06: Stage 3a chronic kidney disease (H)  2024-05: Uncontrolled hypertension  2023-08: Bacterial meningitis  2023-06: Accelerated hypertension  2023-06: Benign neoplasm of parathyroid gland  2023-06: Compression fracture of L3 vertebra (H)  2023-06: Senile osteoporosis  2021-04: Sensorineural hearing loss, bilateral  2019-09: Memory loss  2015-07: S/P parathyroidectomy  2013-09: Hypercalcemia  2012-12: S/P hip replacement  2012-12: Obesity  2012-12: CARDIOVASCULAR SCREENING; LDL GOAL LESS THAN 100  2012-12: Advanced directives, counseling/discussion  2012-12: HTN (hypertension)  2012-12: DJD (degenerative joint disease)  2012-12: Osteopenia  2012-12: Dyslipidemia  2012-12: Vitamin D deficiency  2012-12: Pre-diabetes  2012-12: Allergic rhinitis  Benign essential HTN  Hyperparathyroidism      Past Medical History:   Diagnosis Date    Allergic rhinitis 12/11/2012    Arthritis     Disease of thyroid gland     DJD (degenerative joint disease) 12/11/2012    Dyslipidemia 12/11/2012    History of transfusion     HTN (hypertension) 12/11/2012    Hypertension     Obesity 12/12/2012    Osteopenia 12/11/2012    Pre-diabetes 12/11/2012    S/P total hip arthroplasty     Vitamin D deficiency 12/11/2012       Social History     Tobacco Use    Smoking status: Never     Passive exposure: Current    Smokeless tobacco: Never   Substance Use Topics    Alcohol use: No       Review of Systems  As above in HPI otherwise negative.    Vitals:    07/16/24 1513   BP: (!) 211/105   Pulse: 74   Resp: 18   Temp: 97.9  F (36.6  C)   TempSrc: Tympanic   SpO2: 97%       General: Patient is resting comfortably no acute distress is afebrile  Patient ambulates into the office with a walker.  HEENT: Head is normocephalic atraumatic   eyes are PERRL EOMI  sclera anicteric   TMs are clear bilaterally  Throat is clear  No cervical lymphadenopathy present  LUNGS: Clear to auscultation bilaterally  HEART: Regular rate and rhythm    Physical Exam      At the end of the encounter, I discussed results, diagnosis, medications. Discussed red flags for immediate return to clinic/ER, as well as indications for follow up if no improvement. Patient understood and agreed to plan. Patient was stable for discharge.

## 2024-07-16 NOTE — TELEPHONE ENCOUNTER
FYI - Status Update    Who is Calling: patient    Update: New blood pressure medication is making her very dizzy and she stopped taking it.     Does caller want a call/response back: Yes     Could we send this information to you in INXPO or would you prefer to receive a phone call?:   Patient would prefer a phone call   Okay to leave a detailed message?: Yes at Home number on file [phone] (home)

## 2024-07-19 ENCOUNTER — OFFICE VISIT (OUTPATIENT)
Dept: INTERNAL MEDICINE | Facility: CLINIC | Age: 89
End: 2024-07-19
Payer: COMMERCIAL

## 2024-07-19 VITALS
HEIGHT: 65 IN | TEMPERATURE: 97.9 F | DIASTOLIC BLOOD PRESSURE: 94 MMHG | OXYGEN SATURATION: 97 % | SYSTOLIC BLOOD PRESSURE: 184 MMHG | RESPIRATION RATE: 18 BRPM | WEIGHT: 188 LBS | BODY MASS INDEX: 31.32 KG/M2 | HEART RATE: 72 BPM

## 2024-07-19 DIAGNOSIS — I10 UNCONTROLLED HYPERTENSION: Primary | ICD-10-CM

## 2024-07-19 PROCEDURE — 99214 OFFICE O/P EST MOD 30 MIN: CPT | Performed by: NURSE PRACTITIONER

## 2024-07-19 PROCEDURE — G2211 COMPLEX E/M VISIT ADD ON: HCPCS | Performed by: NURSE PRACTITIONER

## 2024-07-19 RX ORDER — VERAPAMIL HYDROCHLORIDE 120 MG/1
120 TABLET, FILM COATED, EXTENDED RELEASE ORAL AT BEDTIME
Qty: 60 TABLET | Refills: 1 | Status: SHIPPED | OUTPATIENT
Start: 2024-07-19 | End: 2024-07-29 | Stop reason: SINTOL

## 2024-07-19 RX ORDER — HYDRALAZINE HYDROCHLORIDE 50 MG/1
50 TABLET, FILM COATED ORAL 2 TIMES DAILY
Qty: 180 TABLET | Refills: 3 | Status: SHIPPED | OUTPATIENT
Start: 2024-07-19

## 2024-07-19 NOTE — PROGRESS NOTES
"  Assessment & Plan     Uncontrolled hypertension  Blood pressure is very concerning today and I discussed this with Vanda and her PCA. She has not been taking and of her blood pressure medication as she believes it makes her dizzy. Was was on chlorthalidone 50mg daily and hydralazine 50 mg BID. She was seen in the ER yesterday for her blood pressure and dizziness, but she states nothing was done since she was asymptomatic hypertensive. She states since stopping her blood pressure medication her dizziness is slightly still present. No headaches or visual changes. She does have a history of memory loss, so is not the best historian about her blood pressure history. I discussed with Vanda her blood pressure being this elevated increases her risk of stroke. Therefore she needs to be taking her blood pressure medications. She is wanting to stop the chlorthalidone which we will hold at this time. I instructed her to restart her hydralazine. I will also start her on verapamil 120mg daily. I will see her back in 1-2 week for a recheck. She has upcoming appointment with cardiology on 8/7/24. I reviewed symptoms of stroke, and advised to seek immediate medical attention if these develop.       - verapamil ER (CALAN-SR) 120 MG CR tablet; Take 1 tablet (120 mg) by mouth at bedtime  - hydrALAZINE (APRESOLINE) 50 MG tablet; Take 1 tablet (50 mg) by mouth 2 times daily      The longitudinal plan of care for the diagnosis(es)/condition(s) as documented were addressed during this visit. Due to the added complexity in care, I will continue to support Vanda in the subsequent management and with ongoing continuity of care.        MED REC REQUIRED  Post Medication Reconciliation Status:     BMI  Estimated body mass index is 31.28 kg/m  as calculated from the following:    Height as of this encounter: 1.651 m (5' 5\").    Weight as of this encounter: 85.3 kg (188 lb).             Subjective   Vanda is a 89 year old, presenting " "for the following health issues:  Follow Up (ER follow up- [facility]- blood pressure)    HPI   Vanda is here to follow up on her blood pressure. Was started on chlorthalidone in 5/2024 in addition to her hydralazine for uncontrolled hypertension. She then followed up with me on 6/28/24 and we increased her chlorthalidone to 50 mg daily as she was tolerating the medication at that time. Since then she reports feeling dizzy on and off. Believes its related to her blood pressure medication and stopped all her medications a few days ago. She was noted to have blood pressure readings on over 220 systolic and was seen in the ER. She states they said they couldn't do anything since she ha no symptoms. She still has not taken her blood pressure medications BP today is 228/100 with recheck of 184/94. Denies headaches, vision changes, chest pain or numbness/tingling. Has some ongoing slight dizziness, but states is is improving.     ROS  Comprehensive 12-point review of systems was completed and negative except as noted in HPI.        Objective    BP (!) 228/100 (BP Location: Right arm, Patient Position: Sitting)   Pulse 72   Temp 97.9  F (36.6  C)   Resp 18   Ht 1.651 m (5' 5\")   Wt 85.3 kg (188 lb)   LMP  (LMP Unknown)   SpO2 97%   BMI 31.28 kg/m    Body mass index is 31.28 kg/m .  Physical Exam   Constitutional: In no acute distress.  Clean appearance.  Cardiovascular: Regular rate and rhythm. Very slight systolic murmur noted.   Respiratory: Normal respiratory effort.  Skin: Skin is pink, warm and dry.     Musculoskeletal: Gait at baseline with use of walker.   Psychiatric: Appropriate affect and demeanor.  Memory intact.  Good insight and judgment.  Neurologic: No tremor or involuntary movement noted.            Signed Electronically by: Imelda Rivers NP    "

## 2024-07-19 NOTE — PATIENT INSTRUCTIONS
Stop the chlorthalidone. Continue with the hydralazine 50mg twice daily.     Start verapamil 120 mg a bedtime.     Keep appointment with cardiology.

## 2024-07-21 NOTE — PROGRESS NOTES
Assessment & Plan     Uncontrolled hypertension  88-year-old woman with cognitive impairment here with her PCA.  Recent ER evaluation for sinus congestion and decreased hearing but also found to have elevated blood pressure with systolic over 200.  BP is 170/100 at today's office.  She takes hydralazine 50 mg twice daily.  It looks like she has numerous intolerances to other blood pressure medications but was previously prescribed chlorthalidone but it was not continued for unclear reasons.  I am recommending restarting 25 mg of chlorthalidone daily and she is instructed to schedule a follow-up appointment in 4 weeks to establish with a new primary care provider as her previous PCP Mireya Meadows is no longer at our office.  Instructions provided to PCA who is present.  - chlorthalidone (HYGROTON) 25 MG tablet; Take 1 tablet (25 mg) by mouth daily    Allergic rhinitis, unspecified seasonality, unspecified trigger  Chronic sinus congestion likely related to allergic rhinitis.  Currently using Benadryl but I am recommending discontinuing and trying Zyrtec 10 mg daily which should be better tolerated with less side effects and with more sustained results.  At some point adding Flonase would be a consideration as well.  - cetirizine (ZYRTEC) 10 MG tablet; Take 1 tablet (10 mg) by mouth daily    Congestion of paranasal sinus  Evaluated in the ER with increasing sinus congestion and decreased hearing.  TMs appear normal.  No impacted cerumen.  Presumably related to sinus congestion from allergies but there may have been a component of a sinus infection as well and she was started on Augmentin and is completing a 10-day course.  In addition to finishing the antibiotic, we will start the antihistamine as discussed above.  She should avoid decongestants with her elevated blood pressure.          BMI  Estimated body mass index is 31.28 kg/m  as calculated from the following:    Height as of this encounter: 1.651 m (5'  AR WEEKEND WALKER PROGRAM  [facility]      Patient:Sheyla Byrd     Rehab Dx/Hx: Bradycardia [R00.1]   :1926  MRN:[id number]  Date of Admit: 2024  Room #: 0154/0154-01    Sheyla Byrd is on Physical Therapy's weekend walker list.     Patient participated in Weekend Walker Program?:   [x] Yes; see information below.   [] No; patient refused participation due to      Scheduled Therapies this weekend?:  [] Yes, patient received scheduled therapies in addition to weekend walker activity  [x] No, weekend therapy was not regularly scheduled for this patient    Activity Completed:   [x] Walking: chair/bed  [x] Wheelchair mobility:  feet  [x] Sat up in chair:    [x] Sat up in chair for meals  [] Other:     Assistance needed:   [] No assist / independent  [x] Supervision / Minimal assist  [] Max assistance  [] Other:     Device used:   [x] Walker:  type of walker  [] Cane  [] Wheelchair  [] None  [] Other    Patient tolerated activity well.  Will monitor.     Signature: Isabella ALVAREZ RN   "5\").    Weight as of this encounter: 85.3 kg (188 lb).         Follow-up in 4 weeks to establish care with new PCP and to follow-up hypertension    Subjective   Vanda is a 88 year old, presenting for the following health issues: Here to follow-up after ER evaluation with decreased hearing and sinus congestion and to discuss uncontrolled hypertension.  See assessment and plan for details  Ear Problem (Ears feel plugged, both, decreased hearing / went to ER and was put on ABX for ears)        5/31/2024    11:00 AM   Additional Questions   Accompanied by PCA - Tia     History of Present Illness     Asthma:  She presents for follow up of asthma.  She has no cough, no wheezing, and no shortness of breath. She is not using a relief medication.  She does not have a controller medication. Patient is aware of the following triggers: unaware of any triggers. The patient has had a visit to the Emergency Room, Urgent Care or Hospital due to asthma since the last clinic visit. She has been to the Emergency Room or Urgent Care 1 time.She has had a Hospitalization 1 time.    Reason for visit:  Allergies    She eats 2-3 servings of fruits and vegetables daily.She consumes 1 sweetened beverage(s) daily.She exercises with enough effort to increase her heart rate 9 or less minutes per day.  She exercises with enough effort to increase her heart rate 3 or less days per week.   She is taking medications regularly.                 Review of Systems  Constitutional, HEENT, cardiovascular, pulmonary, gi and gu systems are negative, except as otherwise noted.      Objective    BP (!) 170/100   Pulse 65   Temp 98.8  F (37.1  C)   Resp 16   Ht 1.651 m (5' 5\")   Wt 85.3 kg (188 lb)   LMP  (LMP Unknown)   SpO2 97%   BMI 31.28 kg/m    Body mass index is 31.28 kg/m .  Physical Exam   Pleasant but forgetful elderly woman  Blood pressure 170/100  TMs normal bilaterally with only small amount of cerumen in each external auditory " canal  Lungs clear bilaterally no rales or wheezes  Heart regular rate and rhythm        Signed Electronically by: David Bass MD

## 2024-07-29 ENCOUNTER — OFFICE VISIT (OUTPATIENT)
Dept: INTERNAL MEDICINE | Facility: CLINIC | Age: 89
End: 2024-07-29
Payer: COMMERCIAL

## 2024-07-29 VITALS
HEART RATE: 82 BPM | TEMPERATURE: 97.9 F | WEIGHT: 192 LBS | RESPIRATION RATE: 18 BRPM | OXYGEN SATURATION: 96 % | BODY MASS INDEX: 31.99 KG/M2 | SYSTOLIC BLOOD PRESSURE: 190 MMHG | DIASTOLIC BLOOD PRESSURE: 98 MMHG | HEIGHT: 65 IN

## 2024-07-29 DIAGNOSIS — I10 UNCONTROLLED HYPERTENSION: Primary | ICD-10-CM

## 2024-07-29 PROCEDURE — 99214 OFFICE O/P EST MOD 30 MIN: CPT | Performed by: NURSE PRACTITIONER

## 2024-07-29 PROCEDURE — G2211 COMPLEX E/M VISIT ADD ON: HCPCS | Performed by: NURSE PRACTITIONER

## 2024-07-29 RX ORDER — AMLODIPINE BESYLATE 10 MG/1
10 TABLET ORAL DAILY
Qty: 90 TABLET | Refills: 1 | Status: SHIPPED | OUTPATIENT
Start: 2024-07-29

## 2024-07-29 NOTE — PATIENT INSTRUCTIONS
Stop the verapamil due to side effects.    I am in a start you on amlodipine 10 mg daily.  There is a rash reported as a side effect for this medication over 12 years ago.  However I think it is worth trying this medication again due to your continued uncontrolled high blood pressure.    Keep your appoint with cardiology on 8/7.  Cardiology will likely want to see what your blood pressure readings are at home as well.    See me again in 1 month for follow-up of your blood pressure.  Continue to monitor your blood pressure daily at home. Bring photo of reading with you to your follow up.

## 2024-07-29 NOTE — PROGRESS NOTES
Assessment & Plan     Uncontrolled hypertension  Blood pressure remains uncontrolled.  I was able to view her blood pressure readings at home that she checks once daily as she brought a photo of what they have been.  Readings range from 145 up to 219 systolic with the average being between around the 180 systolic.  At this time I discussed ongoing long-term risks with significantly uncontrolled blood pressure such as CVA.  As patient is not tolerating the verapamil as this caused significant dizziness and swelling of her ankles we will stop this medication.  Patient has reported side effects of multiple blood pressure medications.  At this time I discussed with patient that she does need to take a second medication rather than just the hydralazine twice daily.  In reviewing some of the previous medications tried and their side effects at this time it might be beneficial to try amlodipine 10 mg daily.  She has a rash reported as side effects several years ago.  After discussing this medication patient wishes to try use especially since the side effect reported was not dizziness.  I discussed with patient she may have some initial dizziness after starting any blood pressure medications as we decrease her blood pressure significantly lower than what she has been at baseline.  However I do have increased concerns for fall risk with severe dizziness that is prolonged.  Encourage patient to use any assistance when changing positions and always ambulate with her walker.  I would like to see her back in 1 month.  She has an appointment with cardiology on 8/7/2024 and I recommend she keep this and bring blood pressure readings with her to that appointment as well.   - amLODIPine (NORVASC) 10 MG tablet; Take 1 tablet (10 mg) by mouth daily    The longitudinal plan of care for the diagnosis(es)/condition(s) as documented were addressed during this visit. Due to the added complexity in care, I will continue to support Vanda  "in the subsequent management and with ongoing continuity of care.        BMI  Estimated body mass index is 31.95 kg/m  as calculated from the following:    Height as of this encounter: 1.651 m (5' 5\").    Weight as of this encounter: 87.1 kg (192 lb).             Kerry Dc is a 89 year old, presenting for the following health issues:  Follow Up (Blood pressure)      7/29/2024    10:43 AM   Additional Questions   Roomed by Domenica SHERWOOD   Vanda is here today to follow-up on her blood pressure.  She has had uncontrolled hypertension and has failed multiple medications mostly due to side effects.  Currently she is tolerating the hydralazine 50 milligrams twice daily.  I saw her on 7/19/2024 and started her on verapamil.  She states that immediately after taking this medication she had severe dizziness and swelling in her feet.  She has not taken this medication since.  States that she has some dizziness on and off her baseline.  Typically ambulates with a walker.  Denies any chest pain, headaches or vision changes.  I was able to review her home blood pressure readings on her PCAs phone.  Blood pressure readings range from 145 systolic at the lowest with 219 as the highest.  Average around 180 systolic.  She is asymptomatic today.        ROS  Comprehensive 12-point review of systems was completed and negative except as noted in HPI.        Objective    BP (!) 222/100 (BP Location: Right arm, Patient Position: Sitting)   Pulse 82   Temp 97.9  F (36.6  C)   Resp 18   Ht 1.651 m (5' 5\")   Wt 87.1 kg (192 lb)   LMP  (LMP Unknown)   SpO2 96%   BMI 31.95 kg/m    Body mass index is 31.95 kg/m .  Physical Exam   Constitutional: In no acute distress.  Clean appearance.  Cardiovascular: Regular rate.  Respiratory: Normal respiratory effort.  Skin: Skin is pink, warm and dry.     Musculoskeletal: Gait normal.  Able to mount exam table without difficulties.  Psychiatric: Appropriate affect and demeanor.  " Memory intact.  Good insight and judgment.  Neurologic: No tremor or involuntary movement noted.          Signed Electronically by: Imelda Rivers NP

## 2024-08-06 NOTE — PROGRESS NOTES
"[facility]  CARDIOVASCULAR MEDICINE CLINIC NOTE    Referring Provider: Imelda Rivers   Primary Care Provider: Imelda Rivers     Patient Name: Vanda Sanchez   MRN: [id number]     PERTINENT CLINICAL HISTORY:   Vanda Sanchez is a 89 year old female with below PMH who is referred for HTN management and a new heart murmur. She has a long standing history of uncontrolled HTN, and has reported many \"allergies\" to these medications. She was previously prescribed back in may chlorithalidone, which didn't control her BP, her dose was then increased to 50mg every day in the following month. She then developed dizziness at the office after holding her medication for 2 days and had an SBP>200, leading to presentation to the ED where it was reported she didn't have any symptoms. Repeat follow-up with IM involved a new regimen of Hydralazing 50mg Bid and verapamil ER 120mg Qpm. F/U 10 days letter showed verapamil causing significant dizziness and ankle swelling. Verapamil was stopped and she was started on amlodipine 10mg every day.    Patient is accompanied by PCA.  She tells me that she monitors her blood pressure at home but cannot remember her blood pressure readings.  She has not brought a log or anything like that.  She is not sure which blood pressure medications she is taking.  She tells me that there have been lately many medication changes.  Her blood pressure in clinic is 179/78 mmHg.    Patient is not very active.  She is walking with her cane.  Denies chest pain, shortness of breath, or syncope.  She has lower extremity edema on both legs.  Reports feeling dizzy sitting or standing.  Sometimes holds onto something to keep her balance.  No falls.    Echocardiogram 7/15/2024 shows normal biventricular function, mild aortic stenosis.     PAST MEDICAL HISTORY:     Past Medical History:   Diagnosis Date    Allergic rhinitis 12/11/2012    Arthritis     Disease of thyroid gland     DJD " (degenerative joint disease) 12/11/2012    Dyslipidemia 12/11/2012    History of transfusion     HTN (hypertension) 12/11/2012    Hypertension     Obesity 12/12/2012    Osteopenia 12/11/2012    Pre-diabetes 12/11/2012    S/P total hip arthroplasty     Vitamin D deficiency 12/11/2012        PAST SURGICAL HISTORY:     Past Surgical History:   Procedure Laterality Date    APPENDECTOMY      EYE SURGERY      cataract bilateral    JOINT REPLACEMENT      left    TONSILLECTOMY      ZZC TOTAL HIP ARTHROPLASTY      Description: Total Hip Replacement;  Recorded: 01/17/2013;        CURRENT MEDICATIONS:     Current Outpatient Medications   Medication Sig Dispense Refill    amLODIPine (NORVASC) 10 MG tablet Take 1 tablet (10 mg) by mouth daily 90 tablet 1    cetirizine (ZYRTEC) 10 MG tablet Take 1 tablet (10 mg) by mouth daily 30 tablet 11    chlorthalidone (HYGROTON) 25 MG tablet Take 2 tablets (50 mg) by mouth daily (Patient not taking: Reported on 7/16/2024)      fluticasone (FLONASE) 50 MCG/ACT nasal spray Spray 1 spray into both nostrils daily 16 g 3    hydrALAZINE (APRESOLINE) 50 MG tablet Take 1 tablet (50 mg) by mouth 2 times daily 180 tablet 3    vitamin C (ASCORBIC ACID) 1000 MG TABS Take 1,000 mg by mouth 2 times daily          ALLERGIES:     Allergies   Allergen Reactions    Atenolol Fatigue    Carvedilol Cramps    Clonidine Nausea and Vomiting    Enalapril     Hctz      She thinks it caused joint arthritis    Lisinopril     Losartan Potassium Swelling    Wheat     Amlodipine Besylate Rash        FAMILY HISTORY:     Family History   Problem Relation Age of Onset    Heart Disease Mother     Lipids Brother     Cancer Mother     Diabetes Mother     Cancer Father     Heart Disease Father     Cancer Sister     Hypertension Sister     Cancer Brother     Heart Disease Brother     Cancer Maternal Grandmother         SOCIAL HISTORY:     Social History     Socioeconomic History    Marital status: Single   Tobacco Use    Smoking  status: Never     Passive exposure: Current    Smokeless tobacco: Never   Vaping Use    Vaping status: Never Used   Substance and Sexual Activity    Alcohol use: No    Drug use: No    Sexual activity: Never   Other Topics Concern    Parent/sibling w/ CABG, MI or angioplasty before 65F 55M? No     Social Determinants of Health     Financial Resource Strain: Unknown (11/22/2023)    Financial Resource Strain     Within the past 12 months, have you or your family members you live with been unable to get utilities (heat, electricity) when it was really needed?: Patient refused   Food Insecurity: Unknown (11/22/2023)    Food Insecurity     Within the past 12 months, did you worry that your food would run out before you got money to buy more?: Patient refused     Within the past 12 months, did the food you bought just not last and you didn t have money to get more?: Patient refused   Transportation Needs: Unknown (11/22/2023)    Transportation Needs     Within the past 12 months, has lack of transportation kept you from medical appointments, getting your medicines, non-medical meetings or appointments, work, or from getting things that you need?: Patient refused    Received from [facility], [facility]    Social Connections   Interpersonal Safety: Low Risk  (5/31/2024)    Interpersonal Safety     Do you feel physically and emotionally safe where you currently live?: Yes     Within the past 12 months, have you been hit, slapped, kicked or otherwise physically hurt by someone?: No     Within the past 12 months, have you been humiliated or emotionally abused in other ways by your partner or ex-partner?: No   Housing Stability: Unknown (11/22/2023)    Housing Stability     Do you have housing? : Patient refused     Are you worried about losing your housing?: Patient refused     Vanda  reports no history of alcohol use. and  reports that she has never smoked.  She has been exposed to tobacco smoke. She has never used smokeless tobacco..     REVIEW OF SYSTEMS:   A comprehensive review of systems was performed and negative unless otherwise noted in the HPI above.      PHYSICAL EXAMINATION:   BP (!) 140/72 (BP Location: Right arm, Patient Position: Sitting, Cuff Size: Adult Large)   Pulse 83   Wt 88.1 kg (194 lb 3.2 oz)   LMP  (LMP Unknown)   BMI 32.32 kg/m    There is no height or weight on file to calculate BMI.  Wt Readings from Last 2 Encounters:   07/29/24 87.1 kg (192 lb)   07/19/24 85.3 kg (188 lb)     Constitutional: no acute distress, pleasant and cooperative, appears overall well.  Eyes: EOMI, PERRLA, sclera white, conjunctiva clear, without icterus or pallor   Ears/Nose/Mouth/Throat: Pinna, tragus, and external canal non-tender, tympanic membranes normal, Nares patent b/l, olfaction intact, dentition good, oropharyx clear  Cardiovascular: RRR nl S1S2, JVP not elevated, extremities with no edema or cyanosis  Respiratory: clear to auscultation and percussion bilaterally anterior and posterior  Gastrointestinal: soft, nontender, non distended, no hepatosplenomegaly or masses  Musculoskeletal: normal muscle bulk and tone, joints   Skin: normal skin appearance without worrisome lesions.       LABORATORY DATA:     LIPID RESULTS:  Recent Labs   Lab Test 06/28/24  1025   CHOL 172   HDL 45*   *   TRIG 127        LIVER ENZYME RESULTS:  Recent Labs   Lab Test 11/22/23  1344 04/24/19  1317   AST 20 14   ALT 12 14       CBC RESULTS:  Recent Labs   Lab Test 06/28/24  1025 11/22/23  1344   WBC 5.0 6.2   HGB 12.2 12.1   HCT 37.4 36.7   * 205       BMP RESULTS:  Recent Labs   Lab Test 06/28/24  1025 11/22/23  1344    134*   POTASSIUM 3.6 3.9   CHLORIDE 104 99   CO2 27 24   ANIONGAP 9 11   * 110*   BUN 24.4* 10.5   CR 0.97* 1.00*   YOJANA 9.4 9.5       A1C RESULTS:  Lab Results   Component Value Date    A1C 5.9 (H) 06/28/2024       INR RESULTS:  No  "results for input(s): \"INR\" in the last 19473 hours.       PROCEDURES & FURTHER ASSESSMENTS:       ECHO:   7/2024  Interpretation Summary     Left ventricular size, wall motion and function are normal. The ejection  fraction is 60-65%.  Right ventricular function, chamber size, wall motion, and thickness are  normal.  Mild mitral annular calcification is present. Mitral valve sclerosis is  present. Trace mitral insufficiency is present.  There is mild aortic stenosis (CHRISTIANA 1.5 cm^2).  The inferior vena cava was normal in size with preserved respiratory  variability. No pericardial effusion is present.     There is no prior study for direct comparison.     CLINICAL IMPRESSION:     Vanda Sanchez is a 89 year old female with hypertension.  She is asymptomatic in clinic.  Not very active at baseline.  Unfortunately the patient is not aware of the medications that she is currently taking.  She does not know the names or the dosages that she is taking.  She is asymptomatic from cardiac standpoint.  Reports feeling dizzy but this is not associated with body position.  It appears she is feeling dizzy all the time sitting or standing.  Recent echocardiogram showed normal biventricular function with mild aortic stenosis.  Lower extremity edema is likely from amlodipine.  IVC is normal on echo.  So noncardiac edema.    DIAGNOSES:  Patient Active Problem List    Diagnosis Date Noted    Severe episode of recurrent major depressive disorder, without psychotic features (H) 06/28/2024     Priority: Medium    Stage 3a chronic kidney disease (H) 06/28/2024     Priority: Medium    Uncontrolled hypertension 05/31/2024     Priority: Medium    Bacterial meningitis 08/23/2023     Priority: Medium    Accelerated hypertension 06/28/2023     Priority: Medium    Benign neoplasm of parathyroid gland 06/28/2023     Priority: Medium     Formatting of this note might be different from the original.  Created by Conversion      Compression " fracture of L3 vertebra (H) 06/28/2023     Priority: Medium    Senile osteoporosis 06/28/2023     Priority: Medium     Formatting of this note might be different from the original.  Created by Conversion      Sensorineural hearing loss, bilateral 04/07/2021     Priority: Medium    Memory loss 09/25/2019     Priority: Medium    S/P parathyroidectomy 07/24/2015     Priority: Medium    Hypercalcemia 09/09/2013     Priority: Medium    S/P hip replacement 12/12/2012     Priority: Medium     left      Obesity 12/12/2012     Priority: Medium    CARDIOVASCULAR SCREENING; LDL GOAL LESS THAN 100 12/12/2012     Priority: Medium    HTN (hypertension) 12/11/2012     Priority: Medium    DJD (degenerative joint disease) 12/11/2012     Priority: Medium    Osteopenia 12/11/2012     Priority: Medium    Dyslipidemia 12/11/2012     Priority: Medium    Vitamin D deficiency 12/11/2012     Priority: Medium    Pre-diabetes 12/11/2012     Priority: Medium    Allergic rhinitis 12/11/2012     Priority: Medium       PLAN:  1.  Hypertension, well-controlled according to her age  144/78 in Left arm; 140/72 in right arm  -Patient will confirm the medications that she is currently taking.    2024 updated ESC guidelines for hypertension management in the elderly over 80 years.      2.  Mild aortic stenosis  -Recommend recheck echocardiogram in 3 years.  This can be done through primary care physician.    Return to clinic as needed for now.    Total time spent today for this visit is 45 minutes including precharting, face-to-face clinic visit, review of labs/imaging and medical documentation.    Piotr JALLOH MD  [facility] Division of Cardiology  Pager [phone]

## 2024-08-07 ENCOUNTER — OFFICE VISIT (OUTPATIENT)
Dept: CARDIOLOGY | Facility: CLINIC | Age: 89
End: 2024-08-07
Attending: NURSE PRACTITIONER
Payer: COMMERCIAL

## 2024-08-07 ENCOUNTER — TELEPHONE (OUTPATIENT)
Dept: INTERNAL MEDICINE | Facility: CLINIC | Age: 89
End: 2024-08-07

## 2024-08-07 VITALS
WEIGHT: 194.2 LBS | SYSTOLIC BLOOD PRESSURE: 144 MMHG | DIASTOLIC BLOOD PRESSURE: 78 MMHG | HEART RATE: 83 BPM | BODY MASS INDEX: 32.32 KG/M2

## 2024-08-07 DIAGNOSIS — I10 HYPERTENSION, WELL CONTROLLED: Primary | ICD-10-CM

## 2024-08-07 DIAGNOSIS — R01.1 HEART MURMUR: ICD-10-CM

## 2024-08-07 DIAGNOSIS — I35.0 MILD AORTIC STENOSIS: ICD-10-CM

## 2024-08-07 PROCEDURE — 99204 OFFICE O/P NEW MOD 45 MIN: CPT | Performed by: INTERNAL MEDICINE

## 2024-08-07 NOTE — TELEPHONE ENCOUNTER
S-(situation):   Imelda Wyman, a  with medica is calling in with the patient to notify PCP and staff she is experiencing increased swelling and edema in feet and that she is not taking prescribed diuretic chlorthalidone because she was experiencing side effects (she does not remember what the side effects were).     B-(background):   Noted 7/16/24 patient was not taking prescribed diuretic.  LOV 7/19/24    A-(assessment):   Patient does not report any distress at this time, very hard of hearing.    R-(recommendations):   Patient wondering if she should be seen sooner or if she should go on a different diuretic.  noted patient has not used mychart and with call back for recommendations to speak clearly as the patient is very hard of hearing

## 2024-08-07 NOTE — PATIENT INSTRUCTIONS
Thank you for coming to the [facility] at [place]; please note the following instructions:    1. Please call [phone] and let us know what medications you are taking and what the does is.     2. Follow up with your primary care provider. Dr Cook recommends an echocardiogram in 3 years time (around August 2027). Follow up with Cardiology as needed. If you need further refills, please talk to your primary care provider. If you are having further cardiac related issues, we will be happy to see you.     3. No changes to medications at this time.       If you have any questions regarding your visit, please contact your care team:     CARDIOLOGY  TELEPHONE NUMBER   Eusebia BERG., Registered Nurse  Margarita ALLEN, Registered Nurse  Amy BUCKNER, Registered Nurse  Risa PIERRE, Registered Medical Assistant  Nannette CALL, Certified Medical Assistant  Kelin AGGARWAL, Clinic Assistant [phone] (select option 1)    *After hours: [phone]   For Scheduling Appts:     [phone] (select option 1)    *After hours: [phone]   For the Device Clinic (Pacemakers and ICD's)  Bri GRIFFITHS, Registered Nurse   During business hours: [phone]    *After business hours:  [phone] (select option 4)      Normal test result notifications will be released via Rep or mailed within 7 business days.  All other test results, will be communicated via telephone once reviewed by your cardiologist.    If you need a medication refill, please contact your pharmacy.  Please allow 3 business days for your refill to be completed.    As always, thank you for trusting us with your health care needs!

## 2024-08-07 NOTE — NURSING NOTE
"Initial BP (!) 179/78 (BP Location: Left arm, Patient Position: Sitting, Cuff Size: Adult Regular)   Pulse 83   Wt 88.1 kg (194 lb 3.2 oz)   LMP  (LMP Unknown)   BMI 32.32 kg/m   Estimated body mass index is 32.32 kg/m  as calculated from the following:    Height as of 7/29/24: 1.651 m (5' 5\").    Weight as of this encounter: 88.1 kg (194 lb 3.2 oz)..  BP completed using cuff size: regular     Margarita Hatch, RN, BSN  Cardiology RN Care Coordinator   [place]   Phone: [phone]  Fax: [phone] ([place]) [phone] ([place])    "

## 2024-08-07 NOTE — LETTER
"8/7/2024      RE: Vanda Sanchez  [address]   [address]  [facility] [address]       Dear Colleague,    Thank you for the opportunity to participate in the care of your patient, Vanda Sanchez, at the [facility] [place] at [facility]. Please see a copy of my visit note below.    [facility]  CARDIOVASCULAR MEDICINE CLINIC NOTE    Referring Provider: Imelda Rivers   Primary Care Provider: Imelda Rivers     Patient Name: Vanda Sanchez   MRN: [id number]     PERTINENT CLINICAL HISTORY:   Vanda Sanchez is a 89 year old female with below PMH who is referred for HTN management and a new heart murmur. She has a long standing history of uncontrolled HTN, and has reported many \"allergies\" to these medications. She was previously prescribed back in may chlorithalidone, which didn't control her BP, her dose was then increased to 50mg every day in the following month. She then developed dizziness at the office after holding her medication for 2 days and had an SBP>200, leading to presentation to the ED where it was reported she didn't have any symptoms. Repeat follow-up with IM involved a new regimen of Hydralazing 50mg Bid and verapamil ER 120mg Qpm. F/U 10 days letter showed verapamil causing significant dizziness and ankle swelling. Verapamil was stopped and she was started on amlodipine 10mg every day.    Patient is accompanied by PCA.  She tells me that she monitors her blood pressure at home but cannot remember her blood pressure readings.  She has not brought a log or anything like that.  She is not sure which blood pressure medications she is taking.  She tells me that there have been lately many medication changes.  Her blood pressure in clinic is 179/78 mmHg.    Patient is not very active.  She is walking with her cane.  Denies chest pain, shortness of breath, or syncope.  She has lower extremity edema on both legs.  " Reports feeling dizzy sitting or standing.  Sometimes holds onto something to keep her balance.  No falls.    Echocardiogram 7/15/2024 shows normal biventricular function, mild aortic stenosis.     PAST MEDICAL HISTORY:     Past Medical History:   Diagnosis Date     Allergic rhinitis 12/11/2012     Arthritis      Disease of thyroid gland      DJD (degenerative joint disease) 12/11/2012     Dyslipidemia 12/11/2012     History of transfusion      HTN (hypertension) 12/11/2012     Hypertension      Obesity 12/12/2012     Osteopenia 12/11/2012     Pre-diabetes 12/11/2012     S/P total hip arthroplasty      Vitamin D deficiency 12/11/2012        PAST SURGICAL HISTORY:     Past Surgical History:   Procedure Laterality Date     APPENDECTOMY       EYE SURGERY      cataract bilateral     JOINT REPLACEMENT      left     TONSILLECTOMY       ZZC TOTAL HIP ARTHROPLASTY      Description: Total Hip Replacement;  Recorded: 01/17/2013;        CURRENT MEDICATIONS:     Current Outpatient Medications   Medication Sig Dispense Refill     amLODIPine (NORVASC) 10 MG tablet Take 1 tablet (10 mg) by mouth daily 90 tablet 1     cetirizine (ZYRTEC) 10 MG tablet Take 1 tablet (10 mg) by mouth daily 30 tablet 11     chlorthalidone (HYGROTON) 25 MG tablet Take 2 tablets (50 mg) by mouth daily (Patient not taking: Reported on 7/16/2024)       fluticasone (FLONASE) 50 MCG/ACT nasal spray Spray 1 spray into both nostrils daily 16 g 3     hydrALAZINE (APRESOLINE) 50 MG tablet Take 1 tablet (50 mg) by mouth 2 times daily 180 tablet 3     vitamin C (ASCORBIC ACID) 1000 MG TABS Take 1,000 mg by mouth 2 times daily          ALLERGIES:     Allergies   Allergen Reactions     Atenolol Fatigue     Carvedilol Cramps     Clonidine Nausea and Vomiting     Enalapril      Hctz      She thinks it caused joint arthritis     Lisinopril      Losartan Potassium Swelling     Wheat      Amlodipine Besylate Rash        FAMILY HISTORY:     Family History   Problem  Relation Age of Onset     Heart Disease Mother      Lipids Brother      Cancer Mother      Diabetes Mother      Cancer Father      Heart Disease Father      Cancer Sister      Hypertension Sister      Cancer Brother      Heart Disease Brother      Cancer Maternal Grandmother         SOCIAL HISTORY:     Social History     Socioeconomic History     Marital status: Single   Tobacco Use     Smoking status: Never     Passive exposure: Current     Smokeless tobacco: Never   Vaping Use     Vaping status: Never Used   Substance and Sexual Activity     Alcohol use: No     Drug use: No     Sexual activity: Never   Other Topics Concern     Parent/sibling w/ CABG, MI or angioplasty before 65F 55M? No     Social Determinants of Health     Financial Resource Strain: Unknown (11/22/2023)    Financial Resource Strain      Within the past 12 months, have you or your family members you live with been unable to get utilities (heat, electricity) when it was really needed?: Patient refused   Food Insecurity: Unknown (11/22/2023)    Food Insecurity      Within the past 12 months, did you worry that your food would run out before you got money to buy more?: Patient refused      Within the past 12 months, did the food you bought just not last and you didn t have money to get more?: Patient refused   Transportation Needs: Unknown (11/22/2023)    Transportation Needs      Within the past 12 months, has lack of transportation kept you from medical appointments, getting your medicines, non-medical meetings or appointments, work, or from getting things that you need?: Patient refused    Received from [facility], [facility]    Social Connections   Interpersonal Safety: Low Risk  (5/31/2024)    Interpersonal Safety      Do you feel physically and emotionally safe where you currently live?: Yes      Within the past 12 months, have you been hit, slapped, kicked or otherwise  physically hurt by someone?: No      Within the past 12 months, have you been humiliated or emotionally abused in other ways by your partner or ex-partner?: No   Housing Stability: Unknown (11/22/2023)    Housing Stability      Do you have housing? : Patient refused      Are you worried about losing your housing?: Patient refused     Vanda  reports no history of alcohol use. and  reports that she has never smoked. She has been exposed to tobacco smoke. She has never used smokeless tobacco..     REVIEW OF SYSTEMS:   A comprehensive review of systems was performed and negative unless otherwise noted in the HPI above.      PHYSICAL EXAMINATION:   BP (!) 140/72 (BP Location: Right arm, Patient Position: Sitting, Cuff Size: Adult Large)   Pulse 83   Wt 88.1 kg (194 lb 3.2 oz)   LMP  (LMP Unknown)   BMI 32.32 kg/m    There is no height or weight on file to calculate BMI.  Wt Readings from Last 2 Encounters:   07/29/24 87.1 kg (192 lb)   07/19/24 85.3 kg (188 lb)     Constitutional: no acute distress, pleasant and cooperative, appears overall well.  Eyes: EOMI, PERRLA, sclera white, conjunctiva clear, without icterus or pallor   Ears/Nose/Mouth/Throat: Pinna, tragus, and external canal non-tender, tympanic membranes normal, Nares patent b/l, olfaction intact, dentition good, oropharyx clear  Cardiovascular: RRR nl S1S2, JVP not elevated, extremities with no edema or cyanosis  Respiratory: clear to auscultation and percussion bilaterally anterior and posterior  Gastrointestinal: soft, nontender, non distended, no hepatosplenomegaly or masses  Musculoskeletal: normal muscle bulk and tone, joints   Skin: normal skin appearance without worrisome lesions.       LABORATORY DATA:     LIPID RESULTS:  Recent Labs   Lab Test 06/28/24  1025   CHOL 172   HDL 45*   *   TRIG 127        LIVER ENZYME RESULTS:  Recent Labs   Lab Test 11/22/23  1344 04/24/19  1317   AST 20 14   ALT 12 14       CBC RESULTS:  Recent Labs   Lab  "Test 06/28/24  1025 11/22/23  1344   WBC 5.0 6.2   HGB 12.2 12.1   HCT 37.4 36.7   * 205       BMP RESULTS:  Recent Labs   Lab Test 06/28/24  1025 11/22/23  1344    134*   POTASSIUM 3.6 3.9   CHLORIDE 104 99   CO2 27 24   ANIONGAP 9 11   * 110*   BUN 24.4* 10.5   CR 0.97* 1.00*   YOJANA 9.4 9.5       A1C RESULTS:  Lab Results   Component Value Date    A1C 5.9 (H) 06/28/2024       INR RESULTS:  No results for input(s): \"INR\" in the last 03403 hours.       PROCEDURES & FURTHER ASSESSMENTS:       ECHO:   7/2024  Interpretation Summary     Left ventricular size, wall motion and function are normal. The ejection  fraction is 60-65%.  Right ventricular function, chamber size, wall motion, and thickness are  normal.  Mild mitral annular calcification is present. Mitral valve sclerosis is  present. Trace mitral insufficiency is present.  There is mild aortic stenosis (CHRISTIANA 1.5 cm^2).  The inferior vena cava was normal in size with preserved respiratory  variability. No pericardial effusion is present.     There is no prior study for direct comparison.     CLINICAL IMPRESSION:     Vanda Sanchez is a 89 year old female with hypertension.  She is asymptomatic in clinic.  Not very active at baseline.  Unfortunately the patient is not aware of the medications that she is currently taking.  She does not know the names or the dosages that she is taking.  She is asymptomatic from cardiac standpoint.  Reports feeling dizzy but this is not associated with body position.  It appears she is feeling dizzy all the time sitting or standing.  Recent echocardiogram showed normal biventricular function with mild aortic stenosis.  Lower extremity edema is likely from amlodipine.  IVC is normal on echo.  So noncardiac edema.    DIAGNOSES:  Patient Active Problem List    Diagnosis Date Noted     Severe episode of recurrent major depressive disorder, without psychotic features (H) 06/28/2024     Priority: Medium     Stage 3a " chronic kidney disease (H) 06/28/2024     Priority: Medium     Uncontrolled hypertension 05/31/2024     Priority: Medium     Bacterial meningitis 08/23/2023     Priority: Medium     Accelerated hypertension 06/28/2023     Priority: Medium     Benign neoplasm of parathyroid gland 06/28/2023     Priority: Medium     Formatting of this note might be different from the original.  Created by Conversion       Compression fracture of L3 vertebra (H) 06/28/2023     Priority: Medium     Senile osteoporosis 06/28/2023     Priority: Medium     Formatting of this note might be different from the original.  Created by Conversion       Sensorineural hearing loss, bilateral 04/07/2021     Priority: Medium     Memory loss 09/25/2019     Priority: Medium     S/P parathyroidectomy 07/24/2015     Priority: Medium     Hypercalcemia 09/09/2013     Priority: Medium     S/P hip replacement 12/12/2012     Priority: Medium     left       Obesity 12/12/2012     Priority: Medium     CARDIOVASCULAR SCREENING; LDL GOAL LESS THAN 100 12/12/2012     Priority: Medium     HTN (hypertension) 12/11/2012     Priority: Medium     DJD (degenerative joint disease) 12/11/2012     Priority: Medium     Osteopenia 12/11/2012     Priority: Medium     Dyslipidemia 12/11/2012     Priority: Medium     Vitamin D deficiency 12/11/2012     Priority: Medium     Pre-diabetes 12/11/2012     Priority: Medium     Allergic rhinitis 12/11/2012     Priority: Medium       PLAN:  1.  Hypertension, well-controlled according to her age  144/78 in Left arm; 140/72 in right arm  -Patient will confirm the medications that she is currently taking.    2024 updated ESC guidelines for hypertension management in the elderly over 80 years.      2.  Mild aortic stenosis  -Recommend recheck echocardiogram in 3 years.  This can be done through primary care physician.    Return to clinic as needed for now.    Total time spent today for this visit is 45 minutes including precharting,  face-to-face clinic visit, review of labs/imaging and medical documentation.    Piotr JALLOH MD  [facility] Division of Cardiology  Pager [phone]       Please do not hesitate to contact me if you have any questions/concerns.     Sincerely,     Piotr Jalloh MD

## 2024-08-12 NOTE — TELEPHONE ENCOUNTER
I would recommend scheduling this patient a follow-up appointment with Imelda when she returns next week

## 2024-08-13 ENCOUNTER — MYC MEDICAL ADVICE (OUTPATIENT)
Dept: CARDIOLOGY | Facility: CLINIC | Age: 89
End: 2024-08-13
Payer: COMMERCIAL

## 2024-08-13 DIAGNOSIS — I10 UNCONTROLLED HYPERTENSION: ICD-10-CM

## 2024-08-14 NOTE — TELEPHONE ENCOUNTER
Attempted to call patient. Left message for patient.    Margarita Hatch, RN, BSN  Cardiology RN Care Coordinator   [place]   Phone: [phone]  Fax: [phone] ([place]) [phone] ([place])

## 2024-08-16 NOTE — TELEPHONE ENCOUNTER
Attempted to call patient. Unable to reach patient.    Margarita Hatch, RN, BSN  Cardiology RN Care Coordinator   [place]   Phone: [phone]  Fax: [phone] ([place]) [phone] ([place])

## 2024-08-19 ENCOUNTER — NURSE TRIAGE (OUTPATIENT)
Dept: INTERNAL MEDICINE | Facility: CLINIC | Age: 89
End: 2024-08-19
Payer: COMMERCIAL

## 2024-08-19 NOTE — TELEPHONE ENCOUNTER
Provider Recommendation Follow Up:   Reached patient/caregiver. Informed of provider's recommendations. Patient verbalized understanding and agrees with the plan.

## 2024-08-19 NOTE — TELEPHONE ENCOUNTER
Nurse Triage SBAR    Is this a 2nd Level Triage? YES, LICENSED PRACTITIONER REVIEW IS REQUIRED    Situation: patient calling in and requesting antibiotics for her allergies.     Background:  Patient said that she gets really bad seasonal allergies and has been on an antibiotic before.     ED visit: 5/24/24        Assessment:     Patient says her symptoms are really bad congestion     She is taking nasal spray and Zyrtec       Protocol Recommended Disposition:   See in Office Within 2 Weeks    Recommendation: Patient waiting provider to prescribe antibiotics      Routed to provider    Does the patient meet one of the following criteria for ADS visit consideration? No    Reason for Disposition   Nasal allergies occur year-round    Additional Information   Negative: Wheezing (high pitched whistling sound) and previous asthma attacks or use of asthma medicines   Negative: Doesn't match the SYMPTOMS for nasal allergy   Negative: Patient sounds very sick or weak to the triager   Negative: Lots of coughing   Negative: Lots of yellow or green discharge from nose, present > 3 days   Negative: Nasal discharge present > 10 days   Negative: MODERATE-SEVERE nasal allergy symptoms (i.e., interfere with sleep, school, or work) and taking antihistamines > 2 days   Negative: Patient wants to be seen   Negative: Nasal allergies occur only certain times of year and diagnosis of hay fever has never been confirmed by a doctor (or NP/PA)    Protocols used: Nasal Allergies (Hay Fever)-A-[place]

## 2024-08-21 RX ORDER — CHLORTHALIDONE 25 MG/1
25 TABLET ORAL DAILY
COMMUNITY
Start: 2024-08-21 | End: 2024-09-13

## 2024-08-21 NOTE — TELEPHONE ENCOUNTER
Spoke to patient and confirmed that she is taking amlodipine 10 mg daily, hydralazine 50 mg BID, chlorthalidone 25 mg daily.  Medication list updated.  Previous chlorthalidone dose on med list was listed as 50 mg daily.    DUANE Cook.    Eusebia Hart, RN  Cardiology Care Coordinator  [facility]  [phone] option 1

## 2024-09-06 DIAGNOSIS — I10 UNCONTROLLED HYPERTENSION: ICD-10-CM

## 2024-09-06 RX ORDER — CHLORTHALIDONE 25 MG/1
25 TABLET ORAL DAILY
Status: CANCELLED | OUTPATIENT
Start: 2024-09-06

## 2024-09-06 NOTE — TELEPHONE ENCOUNTER
Pending Prescriptions:                       Disp   Refills    chlorthalidone (HYGROTON) 25 MG tablet                        Sig: Take 1 tablet (25 mg) by mouth daily.

## 2024-09-06 NOTE — TELEPHONE ENCOUNTER
chlorthalidone (HYGROTON) 25 MG tablet          Sig: Take 1 tablet (25 mg) by mouth daily.    Disp: Not specified    Refills:    Start: 9/6/2024    Class: E-Prescribe    For: Uncontrolled hypertension    Last ordered: 2 weeks ago (8/21/2024) by Piotr Cook MD    Diuretics (Including Combos) Protocol Tpoabe6609/06/2024 11:13 AM   Protocol Details Blood pressure under 140/90 in past 12 months    Has GFR on file in past 12 months and most recent value is normal    Medication is active on med list    Medication indicated for associated diagnosis    Recent (12 mo) or future (90 days) visit within the authorizing provider's specialty    Patient is age 18 or older    No active pregancy on record    No positive pregnancy test in past 12 months      To be filled at: None     Patient saw Dr. Cook on 8/7/24. Patient has been taking chlorthalidone 25 mg daily. Patient was to follow up as needed with Cardiology. Patient was to follow up with primary care provider for refills.     Attempted to call patient to discuss. Left message for patient.    Margarita Hatch, RN, BSN  Cardiology RN Care Coordinator   [place]   Phone: [phone]  Fax: [phone] ([place]) [phone] ([place])

## 2024-09-06 NOTE — TELEPHONE ENCOUNTER
[facility] Call Center    Phone Message    May a detailed message be left on voicemail: yes     Reason for Call: Medication Refill Request    Has the patient contacted the pharmacy for the refill? Yes   Name of medication being requested:   chlorthalidone (HYGROTON) 25 MG tablet   Provider who prescribed the medication: Dr Cook  Pharmacy:    [id number] [address]    Date medication is needed: 9/6   The patient has been out for about 2 weeks     Action Taken: Other: Cardiology    Travel Screening: Not Applicable    Thank you!  [facility]       Date of Service:

## 2024-09-11 NOTE — TELEPHONE ENCOUNTER
Second attempt to call patient. Left message for patient.    Margarita Hatch, RN, BSN  Cardiology RN Care Coordinator   [place]   Phone: [phone]  Fax: [phone] ([place]) [phone] ([place])

## 2024-09-12 ENCOUNTER — HOSPITAL ENCOUNTER (EMERGENCY)
Facility: CLINIC | Age: 89
Discharge: HOME | End: 2024-09-12
Attending: EMERGENCY MEDICINE | Admitting: EMERGENCY MEDICINE
Payer: COMMERCIAL

## 2024-09-12 VITALS
SYSTOLIC BLOOD PRESSURE: 194 MMHG | HEART RATE: 86 BPM | TEMPERATURE: 97.8 F | DIASTOLIC BLOOD PRESSURE: 66 MMHG | RESPIRATION RATE: 15 BRPM | OXYGEN SATURATION: 98 %

## 2024-09-12 DIAGNOSIS — I10 UNCONTROLLED HYPERTENSION: ICD-10-CM

## 2024-09-12 DIAGNOSIS — I10 ESSENTIAL HYPERTENSION: ICD-10-CM

## 2024-09-12 LAB
ALBUMIN SERPL BCG-MCNC: 4.4 G/DL (ref 3.5–5.2)
ALP SERPL-CCNC: 76 U/L (ref 40–150)
ALT SERPL W P-5'-P-CCNC: 16 U/L (ref 0–50)
ANION GAP SERPL CALCULATED.3IONS-SCNC: 12 MMOL/L (ref 7–15)
AST SERPL W P-5'-P-CCNC: 23 U/L (ref 0–45)
BASOPHILS # BLD AUTO: 0 10E3/UL (ref 0–0.2)
BASOPHILS NFR BLD AUTO: 0 %
BILIRUB SERPL-MCNC: 0.5 MG/DL
BUN SERPL-MCNC: 14 MG/DL (ref 8–23)
CALCIUM SERPL-MCNC: 9.8 MG/DL (ref 8.8–10.4)
CHLORIDE SERPL-SCNC: 104 MMOL/L (ref 98–107)
CREAT SERPL-MCNC: 0.89 MG/DL (ref 0.51–0.95)
EGFRCR SERPLBLD CKD-EPI 2021: 62 ML/MIN/1.73M2
EOSINOPHIL # BLD AUTO: 0.1 10E3/UL (ref 0–0.7)
EOSINOPHIL NFR BLD AUTO: 2 %
ERYTHROCYTE [DISTWIDTH] IN BLOOD BY AUTOMATED COUNT: 15.5 % (ref 10–15)
GLUCOSE SERPL-MCNC: 120 MG/DL (ref 70–99)
HCO3 SERPL-SCNC: 23 MMOL/L (ref 22–29)
HCT VFR BLD AUTO: 36.9 % (ref 35–47)
HGB BLD-MCNC: 11.8 G/DL (ref 11.7–15.7)
HOLD SPECIMEN: NORMAL
HOLD SPECIMEN: NORMAL
IMM GRANULOCYTES # BLD: 0 10E3/UL
IMM GRANULOCYTES NFR BLD: 0 %
LYMPHOCYTES # BLD AUTO: 0.8 10E3/UL (ref 0.8–5.3)
LYMPHOCYTES NFR BLD AUTO: 17 %
MCH RBC QN AUTO: 28.9 PG (ref 26.5–33)
MCHC RBC AUTO-ENTMCNC: 32 G/DL (ref 31.5–36.5)
MCV RBC AUTO: 90 FL (ref 78–100)
MONOCYTES # BLD AUTO: 0.3 10E3/UL (ref 0–1.3)
MONOCYTES NFR BLD AUTO: 6 %
NEUTROPHILS # BLD AUTO: 3.5 10E3/UL (ref 1.6–8.3)
NEUTROPHILS NFR BLD AUTO: 75 %
NRBC # BLD AUTO: 0 10E3/UL
NRBC BLD AUTO-RTO: 0 /100
NT-PROBNP SERPL-MCNC: 339 PG/ML (ref 0–1800)
PLATELET # BLD AUTO: 153 10E3/UL (ref 150–450)
POTASSIUM SERPL-SCNC: 4 MMOL/L (ref 3.4–5.3)
PROT SERPL-MCNC: 7.8 G/DL (ref 6.4–8.3)
RBC # BLD AUTO: 4.09 10E6/UL (ref 3.8–5.2)
SODIUM SERPL-SCNC: 139 MMOL/L (ref 135–145)
WBC # BLD AUTO: 4.7 10E3/UL (ref 4–11)

## 2024-09-12 PROCEDURE — 36415 COLL VENOUS BLD VENIPUNCTURE: CPT | Performed by: EMERGENCY MEDICINE

## 2024-09-12 PROCEDURE — 99283 EMERGENCY DEPT VISIT LOW MDM: CPT | Performed by: EMERGENCY MEDICINE

## 2024-09-12 PROCEDURE — 99284 EMERGENCY DEPT VISIT MOD MDM: CPT | Performed by: EMERGENCY MEDICINE

## 2024-09-12 PROCEDURE — 83880 ASSAY OF NATRIURETIC PEPTIDE: CPT | Performed by: EMERGENCY MEDICINE

## 2024-09-12 PROCEDURE — 85004 AUTOMATED DIFF WBC COUNT: CPT | Performed by: EMERGENCY MEDICINE

## 2024-09-12 PROCEDURE — 82040 ASSAY OF SERUM ALBUMIN: CPT | Performed by: EMERGENCY MEDICINE

## 2024-09-12 RX ORDER — CHLORTHALIDONE 25 MG/1
25 TABLET ORAL DAILY
Status: CANCELLED | OUTPATIENT
Start: 2024-09-12

## 2024-09-12 RX ORDER — CHLORTHALIDONE 25 MG/1
25 TABLET ORAL DAILY
Qty: 30 TABLET | Refills: 0 | Status: SHIPPED | OUTPATIENT
Start: 2024-09-12 | End: 2024-10-02

## 2024-09-12 RX ORDER — CHLORTHALIDONE 25 MG/1
25 TABLET ORAL ONCE
Status: DISCONTINUED | OUTPATIENT
Start: 2024-09-12 | End: 2024-09-12 | Stop reason: HOSPADM

## 2024-09-12 ASSESSMENT — ACTIVITIES OF DAILY LIVING (ADL)
ADLS_ACUITY_SCORE: 33
ADLS_ACUITY_SCORE: 35
ADLS_ACUITY_SCORE: 35

## 2024-09-12 NOTE — ED PROVIDER NOTES
ED Provider Note  [facility]      History     Chief Complaint   Patient presents with    Hypertension    Social Work Services     HPI  Vanda Sanchez is a 89 year old female with a history of hypertension on hydralazine who presents to the emergency department with elevated blood pressure. Per chart review, patient has not had a chlorthalidone in 1 week as pharmacy is not able to get refills.  Her blood pressure has been 180-190 systolic.  3+ pitting edema in by lower legs bilateral lower extremities.  On arrival here the patient states that she has been unable to fill her medication and in that setting has had increasing blood pressures at home as well as bilateral lower extremity edema which she has had in the past.  She denies any orthopnea, chest pain, shortness of breath, abdominal pain, nausea, vomiting, diarrhea or other specific answers at this time.  She states she is making normal amount of urine daily. She has no additional complaints or concerns at this time. Denies headache, weakness, numbness or vision changes.       Past Medical History  Past Medical History:   Diagnosis Date    Allergic rhinitis 12/11/2012    Arthritis     Disease of thyroid gland     DJD (degenerative joint disease) 12/11/2012    Dyslipidemia 12/11/2012    History of transfusion     HTN (hypertension) 12/11/2012    Hypertension     Obesity 12/12/2012    Osteopenia 12/11/2012    Pre-diabetes 12/11/2012    S/P total hip arthroplasty     Vitamin D deficiency 12/11/2012     Past Surgical History:   Procedure Laterality Date    APPENDECTOMY      EYE SURGERY      cataract bilateral    JOINT REPLACEMENT      left    TONSILLECTOMY      ZZC TOTAL HIP ARTHROPLASTY      Description: Total Hip Replacement;  Recorded: 01/17/2013;     chlorthalidone (HYGROTON) 25 MG tablet  amLODIPine (NORVASC) 10 MG tablet  cetirizine (ZYRTEC) 10 MG tablet  chlorthalidone (HYGROTON) 25 MG tablet  fluticasone (FLONASE) 50 MCG/ACT  nasal spray  hydrALAZINE (APRESOLINE) 50 MG tablet  vitamin C (ASCORBIC ACID) 1000 MG TABS      Allergies   Allergen Reactions    Atenolol Fatigue    Carvedilol Cramps    Clonidine Nausea and Vomiting    Enalapril     Hctz      She thinks it caused joint arthritis    Lisinopril     Losartan Potassium Swelling    Wheat     Amlodipine Besylate Rash     Family History  Family History   Problem Relation Age of Onset    Heart Disease Mother     Lipids Brother     Cancer Mother     Diabetes Mother     Cancer Father     Heart Disease Father     Cancer Sister     Hypertension Sister     Cancer Brother     Heart Disease Brother     Cancer Maternal Grandmother      Social History   Social History     Tobacco Use    Smoking status: Never     Passive exposure: Current    Smokeless tobacco: Never   Vaping Use    Vaping status: Never Used   Substance Use Topics    Alcohol use: No    Drug use: No      Past medical history, past surgical history, medications, allergies, family history, and social history were reviewed with the patient. No additional pertinent items.   A medically appropriate review of systems was performed with pertinent positives and negatives noted in the HPI, and all other systems negative.    Physical Exam   BP: (!) 210/90  Pulse: 93  Temp: 97.8  F (36.6  C)  Resp: 15  SpO2: 98 %  Physical Exam  Vitals and nursing note reviewed.   Constitutional:       General: She is not in acute distress.     Appearance: Normal appearance. She is not diaphoretic.   HENT:      Head: Atraumatic.      Mouth/Throat:      Mouth: Mucous membranes are moist.   Eyes:      General: No scleral icterus.     Conjunctiva/sclera: Conjunctivae normal.   Cardiovascular:      Rate and Rhythm: Normal rate.      Heart sounds: Normal heart sounds.   Pulmonary:      Effort: No respiratory distress.      Breath sounds: Normal breath sounds.   Abdominal:      General: Abdomen is flat.   Musculoskeletal:      Cervical back: Neck supple.      Right  lower leg: Edema present.      Left lower leg: Edema present.   Skin:     General: Skin is warm.      Findings: No rash.   Neurological:      General: No focal deficit present.      Mental Status: She is alert and oriented to person, place, and time.   Psychiatric:         Mood and Affect: Mood normal.         Behavior: Behavior normal.           ED Course, Procedures, & Data      Procedures                Results for orders placed or performed during the hospital encounter of 09/12/24   BNP     Status: Normal   Result Value Ref Range    N terminal Pro BNP Inpatient 339 0 - 1,800 pg/mL   Comprehensive metabolic panel     Status: Abnormal   Result Value Ref Range    Sodium 139 135 - 145 mmol/L    Potassium 4.0 3.4 - 5.3 mmol/L    Carbon Dioxide (CO2) 23 22 - 29 mmol/L    Anion Gap 12 7 - 15 mmol/L    Urea Nitrogen 14.0 8.0 - 23.0 mg/dL    Creatinine 0.89 0.51 - 0.95 mg/dL    GFR Estimate 62 >60 mL/min/1.73m2    Calcium 9.8 8.8 - 10.4 mg/dL    Chloride 104 98 - 107 mmol/L    Glucose 120 (H) 70 - 99 mg/dL    Alkaline Phosphatase 76 40 - 150 U/L    AST 23 0 - 45 U/L    ALT 16 0 - 50 U/L    Protein Total 7.8 6.4 - 8.3 g/dL    Albumin 4.4 3.5 - 5.2 g/dL    Bilirubin Total 0.5 <=1.2 mg/dL   [place] Draw     Status: None    Narrative    The following orders were created for panel order [place] Draw.  Procedure                               Abnormality         Status                     ---------                               -----------         ------                     Extra Blue Top Tube[[id number]]                              Final result               Extra Red Top Tube[[id number]]                               Final result                 Please view results for these tests on the individual orders.   CBC with platelets and differential     Status: Abnormal   Result Value Ref Range    WBC Count 4.7 4.0 - 11.0 10e3/uL    RBC Count 4.09 3.80 - 5.20 10e6/uL    Hemoglobin 11.8 11.7 - 15.7 g/dL    Hematocrit 36.9 35.0 -  47.0 %    MCV 90 78 - 100 fL    MCH 28.9 26.5 - 33.0 pg    MCHC 32.0 31.5 - 36.5 g/dL    RDW 15.5 (H) 10.0 - 15.0 %    Platelet Count 153 150 - 450 10e3/uL    % Neutrophils 75 %    % Lymphocytes 17 %    % Monocytes 6 %    % Eosinophils 2 %    % Basophils 0 %    % Immature Granulocytes 0 %    NRBCs per 100 WBC 0 <1 /100    Absolute Neutrophils 3.5 1.6 - 8.3 10e3/uL    Absolute Lymphocytes 0.8 0.8 - 5.3 10e3/uL    Absolute Monocytes 0.3 0.0 - 1.3 10e3/uL    Absolute Eosinophils 0.1 0.0 - 0.7 10e3/uL    Absolute Basophils 0.0 0.0 - 0.2 10e3/uL    Absolute Immature Granulocytes 0.0 <=0.4 10e3/uL    Absolute NRBCs 0.0 10e3/uL   Extra Blue Top Tube     Status: None   Result Value Ref Range    Hold Specimen JIC    Extra Red Top Tube     Status: None   Result Value Ref Range    Hold Specimen JIC    CBC with Platelets & Differential     Status: Abnormal    Narrative    The following orders were created for panel order CBC with Platelets & Differential.  Procedure                               Abnormality         Status                     ---------                               -----------         ------                     CBC with platelets and d...[[id number]]  Abnormal            Final result                 Please view results for these tests on the individual orders.     Medications   chlorthalidone (HYGROTON) tablet 25 mg (has no administration in time range)     Labs Ordered and Resulted from Time of ED Arrival to Time of ED Departure   COMPREHENSIVE METABOLIC PANEL - Abnormal       Result Value    Sodium 139      Potassium 4.0      Carbon Dioxide (CO2) 23      Anion Gap 12      Urea Nitrogen 14.0      Creatinine 0.89      GFR Estimate 62      Calcium 9.8      Chloride 104      Glucose 120 (*)     Alkaline Phosphatase 76      AST 23      ALT 16      Protein Total 7.8      Albumin 4.4      Bilirubin Total 0.5     CBC WITH PLATELETS AND DIFFERENTIAL - Abnormal    WBC Count 4.7      RBC Count 4.09      Hemoglobin 11.8       Hematocrit 36.9      MCV 90      MCH 28.9      MCHC 32.0      RDW 15.5 (*)     Platelet Count 153      % Neutrophils 75      % Lymphocytes 17      % Monocytes 6      % Eosinophils 2      % Basophils 0      % Immature Granulocytes 0      NRBCs per 100 WBC 0      Absolute Neutrophils 3.5      Absolute Lymphocytes 0.8      Absolute Monocytes 0.3      Absolute Eosinophils 0.1      Absolute Basophils 0.0      Absolute Immature Granulocytes 0.0      Absolute NRBCs 0.0     NT PROBNP INPATIENT - Normal    N terminal Pro BNP Inpatient 339       No orders to display          Critical care was not performed.     Medical Decision Making  The patient's presentation was of moderate complexity (a chronic illness mild to moderate exacerbation, progression, or side effect of treatment).    The patient's evaluation involved:  review of external note(s) from 1 sources (see separate area of note for details)  review of 3+ test result(s) ordered prior to this encounter (see separate area of note for details)  ordering and/or review of 3+ test(s) in this encounter (see separate area of note for details)    The patient's management necessitated moderate risk (prescription drug management including medications given in the ED).    Assessment & Plan      Vanda Sanchez is a 89 year old female with a history of hypertension on hydralazine who presents to the emergency department with elevated blood pressure.  Patient is nontoxic-appearing on exam, has vital signs within normal limits with exception of an elevated blood pressure at 210/90.  She is asymptomatic with this blood pressure.  This is also the setting of not taking her chlorthalidone.  She does have bilateral lower extremity edema.  She has no orthopnea, is not hypoxic here and is saturating 98% on room air.  Lung sounds are clear to auscultation.  Patient has no concerning symptoms of chest pain, shortness breath, headache, weakness, numbness, or vision changes.  She was given  chlorthalidone here and I did prescribe it for 1 month and then recommend she follow-up with her primary care physician for further prescriptions and management.    I have reviewed the nursing notes. I have reviewed the findings, diagnosis, plan and need for follow up with the patient.    New Prescriptions    CHLORTHALIDONE (HYGROTON) 25 MG TABLET    Take 1 tablet (25 mg) by mouth daily.       Final diagnoses:   Essential hypertension       Raghav Hernandez MD  [facility] EMERGENCY DEPARTMENT  9/12/2024     Raghav Hernandez MD  09/12/24 [id number]

## 2024-09-12 NOTE — ED TRIAGE NOTES
Pt BIBA with c/o hypertension. Per EMS pt stopped taking her BP medications, now with BLE edema. Pt here for medication refill. Pt also needs a social work consult to help with future transportation needs.      Triage Assessment (Adult)       Row Name 09/12/24 2793          Triage Assessment    Airway WDL WDL        Respiratory WDL    Respiratory WDL WDL        Skin Circulation/Temperature WDL    Skin Circulation/Temperature WDL WDL        Cardiac WDL    Cardiac WDL WDL        Peripheral/Neurovascular WDL    Peripheral Neurovascular WDL X  BLE edema        Cognitive/Neuro/Behavioral WDL    Cognitive/Neuro/Behavioral WDL WDL

## 2024-09-12 NOTE — DISCHARGE INSTRUCTIONS
You had a reassuring evaluation in the emergency department today. Please follow up with your primary care physician for further management. I did place a 1 month prescription for chlorthalidone in the meantime.

## 2024-09-12 NOTE — TELEPHONE ENCOUNTER
09/12/24  Kelin from [employer] calling to see if the Chlorthalidone could be sent in asap.    Kelin also requested care team to reach out to pt to schedule appt as soon as possible. Per Kelin, she is worried about amlodipine contributing to pt's edema.  Marilyn

## 2024-09-12 NOTE — TELEPHONE ENCOUNTER
General Call    Contacts       Contact Date/Time Type Contact Phone/Fax    09/12/2024 12:51 PM CDT Phone (Incoming) Kelin - NP [employer] [facility]           Reason for Call:  Hypertension and Edema    What are your questions or concerns:  Kelin NP with [employer] called. She was at patient's home performing annual check up with patient for insurance.     Pt reports that she has not had her Chlorthalidone in 1 week as pharmacy is not able to get refills.  Kelin reports systolic Bp 180-190. 3+ pitting edema in bilateral lower extremities. Denies SOB/Chest Pain.    Kelin wondering if PCP has any open appointments today. RN relayed provider not in clinic.     Discussed that other providers have availability today but pt does not have ride arranged and is not able to bring herself.    After discission with Kelin - plan is for patient to be taken to ED by EMS for assessment.     Routing to PCP for prescription approval. Pt reports she was taking Chlorthalidone BID but current Rx in med list states 1 time daily.

## 2024-09-13 RX ORDER — CHLORTHALIDONE 25 MG/1
25 TABLET ORAL DAILY
Qty: 90 TABLET | Refills: 1 | Status: SHIPPED | OUTPATIENT
Start: 2024-09-13

## 2024-09-13 NOTE — TELEPHONE ENCOUNTER
Multiple attempts to call patient. Unable to reach patient.     Margarita Hatch, RN, BSN  Cardiology RN Care Coordinator   [place]   Phone: [phone]  Fax: [phone] ([place]) [phone] ([place])

## 2024-09-14 ENCOUNTER — PATIENT OUTREACH (OUTPATIENT)
Dept: CARE COORDINATION | Facility: CLINIC | Age: 89
End: 2024-09-14
Payer: COMMERCIAL

## 2024-09-14 NOTE — PROGRESS NOTES
[facility] Care Resource Center Contact  [facility]/Voicemail     Clinical Data: Care Coordination ED-sourced Outreach-     Outreach attempted x 2.  Left message on patient's voicemail, providing [facility]'s 24/7 scheduling and nurse triage phone number [phone] ([phone]) for questions/concerns and/or to schedule an appt with an [facility] provider.      Care Coordination introduction letter with explanation of Clinic Care Coordination services sent to patient via [employer]. Clinic Care Coordination services remain available via referral if needed.    Plan: [facility] will do no further outreaches at this time.       ORIANA Rosario  Connected Care Resource [place], [facility]    *Connected Care Resource Team does NOT follow patient ongoing. Referrals are identified based on internal discharge reports and the outreach is to ensure patient has an understanding of their discharge instructions.

## 2024-09-14 NOTE — LETTER
Vanda Sanchez  [address]  [address]  [place] [address]    Dear Vanda Sanchez,      I am a team member within the Connected Care Resource Center with  [facility]. I recently tried to reach you to ensure you were doing well following a recent visit within our health system. I also wanted to take this chance to introduce Clinic Care Coordination.     Below is a description of Clinic Care Coordination and how this team can further assist you:       The Clinic Care Coordination team is made up of a Registered Nurse, , Financial Resource Worker, and a Community Health Worker who understand and can help navigate the health care system. The goal of clinic care coordination is to help you manage your health, improve access to care, and achieve optimal health outcomes. They work alongside your provider to assist you in determining your health and social needs, obtain health care and community resources, and provide you with necessary information and education. Clinic Care Coordination can work with you through any barriers and develop a care plan that helps coordinate and strengthen the relationship between you and your care team.    If you wish to connect with the Clinic Care Coordination Team, please let your [facility] Care Provider or Clinic Care Team know and they can place a referral. The Clinic Care Coordination team will then reach out by phone to further support you.    We are focused on providing you with the highest-quality healthcare experience possible.    Sincerely,   Your care team with [facility]

## 2024-10-02 ENCOUNTER — OFFICE VISIT (OUTPATIENT)
Dept: INTERNAL MEDICINE | Facility: CLINIC | Age: 89
End: 2024-10-02
Payer: COMMERCIAL

## 2024-10-02 VITALS
HEART RATE: 74 BPM | OXYGEN SATURATION: 97 % | DIASTOLIC BLOOD PRESSURE: 73 MMHG | WEIGHT: 197 LBS | SYSTOLIC BLOOD PRESSURE: 145 MMHG | BODY MASS INDEX: 32.82 KG/M2 | RESPIRATION RATE: 16 BRPM | HEIGHT: 65 IN | TEMPERATURE: 97.8 F

## 2024-10-02 DIAGNOSIS — R73.03 PRE-DIABETES: Primary | ICD-10-CM

## 2024-10-02 DIAGNOSIS — I10 UNCONTROLLED HYPERTENSION: ICD-10-CM

## 2024-10-02 DIAGNOSIS — J30.9 ALLERGIC RHINITIS, UNSPECIFIED SEASONALITY, UNSPECIFIED TRIGGER: ICD-10-CM

## 2024-10-02 PROBLEM — G00.9 BACTERIAL MENINGITIS: Status: RESOLVED | Noted: 2023-08-23 | Resolved: 2024-10-02

## 2024-10-02 PROCEDURE — 99213 OFFICE O/P EST LOW 20 MIN: CPT | Performed by: NURSE PRACTITIONER

## 2024-10-02 PROCEDURE — G2211 COMPLEX E/M VISIT ADD ON: HCPCS | Performed by: NURSE PRACTITIONER

## 2024-10-02 NOTE — PROGRESS NOTES
"  Assessment & Plan     Uncontrolled hypertension  Vanda is a pleasant 89-year-old female following up today for her blood pressure.  We have had issues controlling her blood pressure in the past with multiple medications that have been tried by other providers however patient reports multiple \"allergies\" to different blood pressure medications.  At my last visit with her we discussed allergies versus possible side effects just at the start of medication and we discussed that we do need to try to continue on medications to see if we can manage her blood pressure.  At that time her blood pressure readings were in the 190s to 200s systolic.  She had multiple ER visits due to uncontrolled blood pressure as well.  For the past couple months she has been taking amlodipine 10 mg daily, chlorthalidone 25 mg daily and hydralazine 50 mg twice daily.  States that her blood pressures have been over 140 at home but not above 200 anymore.  Blood pressure today was 154/76 and 145/73.  I discussed with the patient that this is one of the better readings I have seen since establishing care with her 5 months ago.  At this time we will continue on the current dosing of her her amlodipine, chlorthalidone and hydralazine.  There still seems to be some confusion about possible other blood pressure medication at home that she might be taking but cannot recall what it is.  I recommend she does not take that medication at this time as it may have been believe that she stopped this medication due to a possible side effect.  When I see her at her follow-up appointment I do want her to bring all of her medication bottles with her to her appointment so we can for further review the medication she says at home and ensure that she is not using anything that is not part of the plan of care.  No labs needed today she had labs 2 weeks ago when she was seen in the ER.  I will plan on seeing her again for follow-up in 3 months.  She can follow-up " "sooner if any new symptoms arise or blood pressure becomes uncontrolled again.    Prediabetes  Previous A1c was 5.9 on 6/28/2024.  We will plan on rechecking this lab in 3 months when she follows up her blood pressure.    Allergic rhinitis  Can continue on the Flonase and cetirizine daily.    The longitudinal plan of care for the diagnosis(es)/condition(s) as documented were addressed during this visit. Due to the added complexity in care, I will continue to support Vanda in the subsequent management and with ongoing continuity of care.      BMI  Estimated body mass index is 32.78 kg/m  as calculated from the following:    Height as of this encounter: 1.651 m (5' 5\").    Weight as of this encounter: 89.4 kg (197 lb).       Subjective   Vanda is a 89 year old, presenting for the following health issues:  Follow Up      10/2/2024    10:41 AM   Additional Questions   Roomed by Domenica SOUSA     History of Present Illness       Hypertension: She presents for follow up of hypertension.  She does check blood pressure  regularly outside of the clinic. Outside blood pressures have been over 140/90. She does not follow a low salt diet.     She eats 2-3 servings of fruits and vegetables daily.She consumes 1 sweetened beverage(s) daily.She exercises with enough effort to increase her heart rate 9 or less minutes per day.  She exercises with enough effort to increase her heart rate 3 or less days per week.   She is taking medications regularly.       ROS  Comprehensive 12-point review of systems was completed and negative except as noted in HPI.        Objective    BP (!) 154/76 (BP Location: Right arm, Patient Position: Sitting)   Pulse 74   Temp 97.8  F (36.6  C)   Resp 16   Ht 1.651 m (5' 5\")   Wt 89.4 kg (197 lb)   LMP  (LMP Unknown)   SpO2 97%   BMI 32.78 kg/m    Body mass index is 32.78 kg/m .  Physical Exam   Constitutional: In no acute distress.  Clean appearance.  Cardiovascular: Regular rate.  Respiratory: " Normal respiratory effort.  Skin: Skin is pink, warm and dry.     Musculoskeletal: Gait normal per baseline with use of walker.   Psychiatric: Appropriate affect and demeanor.      Neurologic: No tremor or involuntary movement noted.          Signed Electronically by: Imelda Rivers NP

## 2024-10-02 NOTE — PATIENT INSTRUCTIONS
Follow up in 3 months for recheck. Bring medication bottles with you to that appointment     Labs due in 3 months.     For now continue on amlodipine, chlorthalidone and hydralazine as prescribed for your blood pressure.

## 2024-10-10 NOTE — PROGRESS NOTES
"History of Present Illness - Vanda Sanchez is a very pleasant 89 year old female here to see me in follow up from 12/20/2023, and was seen for the first time due to hearing loss.    She tells me that this all started in [facility] 2022 with a severe URI or allergy.  She started to get severe congestion in the nose, as well as her RIGHT ear.  She has known allergic rhinitis in the spring, so she waitied this out, but her ear on the RIGHT has stayed stuffy.  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    Previous audiology testing was done on 4/7/2021 at [place].  It was personally reviewed for today's exam and consult.  It showed a fairly flat moderate to severe sensorineural hearing loss in both ears.  No conductive hearing loss, tympanograms were normal.  Word recognition was normal on the LEFT, but down to 76% on the RIGHT    A new audiogram was done on 7/25/22.  The nerve line was stable on the LEFT, but the RIGHT side has had a threshold shift, and it appears to be a mixed loss. Tympanogram was also a flat curve on the RIGHT as well.  And therefore at that visit I performed a RIGHT myringotomy without tube and that helped.  She was lost to follow up until now.    She tells me that since seeing me a year ago, things were fine. She continues to use her hearing aid.  But then the \"RIGHT ear just went dead.\"  This was about 2 months ago.  She was not sick, but has allergies.    Past Medical History -   Patient Active Problem List   Diagnosis    HTN (hypertension)    DJD (degenerative joint disease)    Osteopenia    Dyslipidemia    Vitamin D deficiency    Pre-diabetes    Allergic rhinitis    S/P hip replacement    Obesity    CARDIOVASCULAR SCREENING; LDL GOAL LESS THAN 100    Accelerated hypertension    Benign neoplasm of parathyroid gland    Compression fracture of L3 vertebra (H)    Hypercalcemia    " Memory loss    S/P parathyroidectomy    Senile osteoporosis    Sensorineural hearing loss, bilateral    Uncontrolled hypertension    Severe episode of recurrent major depressive disorder, without psychotic features (H)    Stage 3a chronic kidney disease (H)       Current Medications -   Current Outpatient Medications:     amLODIPine (NORVASC) 10 MG tablet, Take 1 tablet (10 mg) by mouth daily, Disp: 90 tablet, Rfl: 1    cetirizine (ZYRTEC) 10 MG tablet, Take 1 tablet (10 mg) by mouth daily, Disp: 30 tablet, Rfl: 11    chlorthalidone (HYGROTON) 25 MG tablet, Take 1 tablet (25 mg) by mouth daily., Disp: 90 tablet, Rfl: 1    fluticasone (FLONASE) 50 MCG/ACT nasal spray, Spray 1 spray into both nostrils daily, Disp: 16 g, Rfl: 3    hydrALAZINE (APRESOLINE) 50 MG tablet, Take 1 tablet (50 mg) by mouth 2 times daily (Patient not taking: Reported on 10/2/2024), Disp: 180 tablet, Rfl: 3    vitamin C (ASCORBIC ACID) 1000 MG TABS, Take 1,000 mg by mouth 2 times daily, Disp: , Rfl:     Allergies -   Allergies   Allergen Reactions    Atenolol Fatigue    Carvedilol Cramps    Clonidine Nausea and Vomiting    Enalapril     Hctz      She thinks it caused joint arthritis    Lisinopril     Losartan Potassium Swelling    Wheat     Amlodipine Besylate Rash       Social History -   Social History     Socioeconomic History    Marital status: Single   Tobacco Use    Smoking status: Never Smoker    Smokeless tobacco: Never Used   Substance and Sexual Activity    Alcohol use: No    Drug use: No    Sexual activity: Never   Other Topics Concern    Parent/sibling w/ CABG, MI or angioplasty before 65F 55M? No     Social Determinants of Health     Financial Resource Strain: Low Risk     Difficulty of Paying Living Expenses: Not very hard   Food Insecurity: No Food Insecurity    Worried About Running Out of Food in the Last Year: Never true    Ran Out of Food in the Last Year: Never true   Transportation Needs: No Transportation Needs    Lack of  Transportation (Medical): No    Lack of Transportation (Non-Medical): No   Social Connections: Unknown    Frequency of Communication with Friends and Family: More than three times a week    Frequency of Social Gatherings with Friends and Family: More than three times a week       Family History -   Family History   Problem Relation Age of Onset    Heart Disease Mother     Lipids Brother     Cancer Mother     Diabetes Mother     Cancer Father     Heart Disease Father     Cancer Sister     Hypertension Sister     Cancer Brother     Heart Disease Brother     Cancer Maternal Grandmother        Review of Systems - As per HPI and PMHx, otherwise 10+ system review of the head and neck, and general constitution is negative.    Physical Exam  Pulse 82   LMP  (LMP Unknown)   SpO2 97%       General - The patient is well nourished and well developed, and appears to have good nutritional status.  Alert and oriented to person and place, answers questions and cooperates with examination appropriately.   Head and Face - Normocephalic and atraumatic, with no gross asymmetry noted of the contour of the facial features.  The facial nerve is intact, with strong symmetric movements.  Voice and Breathing - The patient was breathing comfortably without the use of accessory muscles. There was no wheezing, stridor, or stertor.  The patients voice was clear and strong, and had appropriate pitch and quality.  Ears - The canals were severly obstructed with cerumen bilaterally.    Cerumen Removal    Physical Exam and Procedure  Ears - On examination of the ears, I found that the BOTH sides had cerumen impaction.  Therefore, I positioned them in the examination chair in a semi-supine position, beginning with the right side.  I used the binocular surgical microscope to perform cerumen removal.  I began by using a cerumen loop to gently lift the edges of the cerumen mass away from the walls of the external canal.  Once I did this, I was able to  suction away fragments of wax and debris using suction.  Once the mass was loose enough, the entire plug was pulled from the canal.  The tympanic membrane was intact, no sign of perforation or middle ear effusion.    I turned my attention to the contralateral side once again using the binocular surgical microscope to perform cerumen removal.  I began by using a cerumen loop to gently lift the edges of the cerumen mass away from the walls of the external canal.  Once I did this, I was able to suction away fragments of wax and debris using suction.  Once the mass was loose enough, the entire plug was pulled from the canal.  The tympanic membrane was intact, no sign of perforation or middle ear effusion.        A/P - Vanda Sanchez is a 88 year old female  (H61.23) Bilateral impacted cerumen  (primary encounter diagnosis)  (H90.3) Sensorineural hearing loss (SNHL) of both ears    The problem today was not effusion, but cerumen.  This has been procedurally cleared out and she can follow up as needed

## 2024-10-18 ENCOUNTER — OFFICE VISIT (OUTPATIENT)
Dept: OTOLARYNGOLOGY | Facility: CLINIC | Age: 89
End: 2024-10-18
Payer: COMMERCIAL

## 2024-10-18 VITALS — OXYGEN SATURATION: 97 % | HEART RATE: 82 BPM

## 2024-10-18 DIAGNOSIS — H90.3 SENSORINEURAL HEARING LOSS (SNHL) OF BOTH EARS: ICD-10-CM

## 2024-10-18 DIAGNOSIS — H61.23 BILATERAL IMPACTED CERUMEN: Primary | ICD-10-CM

## 2024-10-18 PROCEDURE — 99213 OFFICE O/P EST LOW 20 MIN: CPT | Mod: 25 | Performed by: OTOLARYNGOLOGY

## 2024-10-18 PROCEDURE — 69210 REMOVE IMPACTED EAR WAX UNI: CPT | Performed by: OTOLARYNGOLOGY

## 2024-10-23 ENCOUNTER — PATIENT OUTREACH (OUTPATIENT)
Dept: CARE COORDINATION | Facility: CLINIC | Age: 89
End: 2024-10-23
Payer: COMMERCIAL

## 2024-11-06 ENCOUNTER — PATIENT OUTREACH (OUTPATIENT)
Dept: CARE COORDINATION | Facility: CLINIC | Age: 89
End: 2024-11-06
Payer: COMMERCIAL

## 2024-11-08 ENCOUNTER — VIRTUAL VISIT (OUTPATIENT)
Dept: INTERNAL MEDICINE | Facility: CLINIC | Age: 89
End: 2024-11-08
Payer: COMMERCIAL

## 2024-11-08 ENCOUNTER — TELEPHONE (OUTPATIENT)
Dept: INTERNAL MEDICINE | Facility: CLINIC | Age: 89
End: 2024-11-08

## 2024-11-08 DIAGNOSIS — R42 VERTIGO: ICD-10-CM

## 2024-11-08 DIAGNOSIS — R26.81 UNSTEADY GAIT WHEN WALKING: Primary | ICD-10-CM

## 2024-11-08 DIAGNOSIS — H93.8X3 PLUGGED FEELING IN EAR, BILATERAL: ICD-10-CM

## 2024-11-08 PROCEDURE — 99442 PR PHYSICIAN TELEPHONE EVALUATION 11-20 MIN: CPT | Mod: 93 | Performed by: NURSE PRACTITIONER

## 2024-11-08 NOTE — PROGRESS NOTES
Vanda is a 89 year old who is being evaluated via a billable telephone visit.    What phone number would you like to be contacted at? [phone]  How would you like to obtain your AVS? Giselle  Originating Location (pt. Location): Home    Distant Location (provider location):  On-site    Assessment & Plan     Unsteady gait when walking  Vanda is a pleasant 89-year-old female being seen via telephone visit today with request for a walker.  She states for the last several weeks she has felt unsteady and her caregiver who comes to her home recommends use of a walker.  Walker was ordered and patient was provided with the phone number to call at the [place] location for medical supplies to get the walker obtained for this weekend.  - Walker Order for DME - ONLY FOR DME    Plugged feeling in ear, bilateral  Vertigo  Patient states she states she has had some episodes of vertigo and ongoing plugged feelings of both her ears. She has denied any chest pain, shortness of breath, visual changes or headaches.  No new numbness or tingling. She saw ENT on 10/18/2024 and states that they just cleaned out her earwax at that time.  She has had some ongoing dizziness on and off.  At this time we discussed this is likely related to her allergic rhinitis and eustachian tube dysfunction causing the vertigo.  We discussed following up with ENT and possibly trying vestibular therapy with physical therapy.  Patient states that she will need to work out a ride with her  but would like to try physical therapy.  I also discussed that she can follow-up with me in clinic if she is unable to get in with ENT or vestibular therapy and symptoms worsen at all.  If she develops any severe dizziness, uncontrolled vomiting, headache or visual changes I do recommend immediate evaluation in the ER.    - Physical Therapy  Referral; Future          BMI  Estimated body mass index is 32.78 kg/m  as calculated from the following:     "Height as of 10/2/24: 1.651 m (5' 5\").    Weight as of 10/2/24: 89.4 kg (197 lb).           Subjective   Vanda is a 89 year old, presenting for the following health issues:  Orders (Would like an order for a walker)      11/8/2024     2:53 PM   Additional Questions   Roomed by Domenica SHERWOOD   Vanda is a pleasant 89-year-old female being seen via telephone visit [facility]  Patient states that her caregiver who comes to her home thinks that a walker will be good for her.  She states for the last Manjula weeks she has been a little more unsteady and has had episodes of vertigo.  States that both ears feel plugged.  Denies any chest pain, visual changes, shortness of breath, fevers, headaches, numbness, tingling or syncopal episode.  She states that she will feel like she is spinning at times if she moves too quickly.  Therefore she feels like a walker will be beneficial when trying to get up and around the home on her own.  She denies any recent falls.  She did see ENT on 10/18/2024 for ear issues.  She states elevated at that visit was clear out some earwax.        ROS  Comprehensive 12-point review of systems was completed and negative except as noted in HPI.        Objective           Vitals:  No vitals were obtained today due to virtual visit.    Physical Exam   General: Alert and no distress //Respiratory: No audible wheeze, cough, or shortness of breath // Psychiatric:  Appropriate affect, tone, and pace of words            Phone call duration: 14 minutes  Signed Electronically by: Imelda Rivers NP    "

## 2024-11-08 NOTE — TELEPHONE ENCOUNTER
New Medication Request    Contacts       Contact Date/Time Type Contact Phone/Fax    11/08/2024 10:10 AM CST Phone (Incoming) Vanda Sanchez ROCK (Self) [phone] (H)            What medication are you requesting?: Patient will like to get prescription for walker  for [facility] [phone]    Reason for medication request: need a walker    Have you taken this medication before?: n/a    Controlled Substance Agreement on file:   CSA -- Patient Level:    CSA: None found at the patient level.         Patient offered an appointment? No    Preferred Pharmacy:   [facility] - [place], [place] - [address]  [address]  [facility] [address]  Phone: [phone] Fax: [phone]    [id number] - [place], [place] - [address]  [address] [facility] [address]  Phone: [phone] Fax: [phone]      Could we send this information to you in [place] or would you prefer to receive a phone call?:   Patient would prefer a phone call   Okay to leave a detailed message?: Yes at Home number on file [phone] (home)

## 2024-11-08 NOTE — TELEPHONE ENCOUNTER
Huddled with Imelda who wanted to do a telephone call or e-visit.     Telephone call set up for today.     Poornima HANNAH RN

## 2025-01-05 ENCOUNTER — HEALTH MAINTENANCE LETTER (OUTPATIENT)
Age: OVER 89
End: 2025-01-05

## 2025-02-10 DIAGNOSIS — I10 UNCONTROLLED HYPERTENSION: ICD-10-CM

## 2025-02-10 RX ORDER — AMLODIPINE BESYLATE 10 MG/1
10 TABLET ORAL DAILY
Qty: 90 TABLET | Refills: 0 | Status: SHIPPED | OUTPATIENT
Start: 2025-02-10

## 2025-02-26 ENCOUNTER — TELEPHONE (OUTPATIENT)
Dept: INTERNAL MEDICINE | Facility: CLINIC | Age: OVER 89
End: 2025-02-26
Payer: COMMERCIAL

## 2025-02-26 NOTE — TELEPHONE ENCOUNTER
Patient Quality Outreach    Patient is due for the following:   Physical Annual Wellness Visit    Action(s) Taken:   Schedule a Annual Wellness Visit    Type of outreach:    Sent [employer] message.    Questions for provider review:    None           Meena Nicole, CMA

## 2025-02-26 NOTE — LETTER
February 26, 2025      Vanda ROCK Sanchez  [address]  [address]  [facility] [address]      Your healthcare team cares about your health. To provide you with the best care, we have reviewed your chart and based on our findings, we see that you are due to:     PREVENTATIVE VISIT: Annual Medicare Wellness:Schedule an Annual Medicare Wellness Exam. Please call your [facility] clinic to set up your appointment.    If you have already completed these items, please contact the clinic via phone or Mychart so your care team can review and update your records.  Thank you for choosing [facility] for your healthcare needs. For any questions, concerns, or to schedule an appointment please contact the clinic.     Healthy Regards,    Your [facility] Care Team

## 2025-03-30 ENCOUNTER — HEALTH MAINTENANCE LETTER (OUTPATIENT)
Age: OVER 89
End: 2025-03-30

## 2025-03-31 ENCOUNTER — OFFICE VISIT (OUTPATIENT)
Dept: FAMILY MEDICINE | Facility: CLINIC | Age: OVER 89
End: 2025-03-31
Payer: COMMERCIAL

## 2025-03-31 VITALS
BODY MASS INDEX: 33.66 KG/M2 | OXYGEN SATURATION: 96 % | DIASTOLIC BLOOD PRESSURE: 62 MMHG | WEIGHT: 202 LBS | SYSTOLIC BLOOD PRESSURE: 142 MMHG | TEMPERATURE: 97.9 F | HEART RATE: 73 BPM | RESPIRATION RATE: 18 BRPM | HEIGHT: 65 IN

## 2025-03-31 DIAGNOSIS — R60.9 1+ PITTING EDEMA: ICD-10-CM

## 2025-03-31 DIAGNOSIS — R73.03 PRE-DIABETES: ICD-10-CM

## 2025-03-31 DIAGNOSIS — M85.80 OSTEOPENIA, UNSPECIFIED LOCATION: ICD-10-CM

## 2025-03-31 DIAGNOSIS — E55.9 VITAMIN D DEFICIENCY: ICD-10-CM

## 2025-03-31 DIAGNOSIS — Z00.00 ENCOUNTER FOR MEDICARE ANNUAL WELLNESS EXAM: Primary | ICD-10-CM

## 2025-03-31 DIAGNOSIS — N18.31 STAGE 3A CHRONIC KIDNEY DISEASE (H): ICD-10-CM

## 2025-03-31 DIAGNOSIS — F33.2 SEVERE EPISODE OF RECURRENT MAJOR DEPRESSIVE DISORDER, WITHOUT PSYCHOTIC FEATURES (H): ICD-10-CM

## 2025-03-31 DIAGNOSIS — I10 UNCONTROLLED HYPERTENSION: ICD-10-CM

## 2025-03-31 DIAGNOSIS — I35.0 MILD AORTIC STENOSIS: ICD-10-CM

## 2025-03-31 DIAGNOSIS — E78.5 DYSLIPIDEMIA: ICD-10-CM

## 2025-03-31 DIAGNOSIS — J30.9 ALLERGIC RHINITIS, UNSPECIFIED SEASONALITY, UNSPECIFIED TRIGGER: ICD-10-CM

## 2025-03-31 LAB
ANION GAP SERPL CALCULATED.3IONS-SCNC: 11 MMOL/L (ref 7–15)
BUN SERPL-MCNC: 11.7 MG/DL (ref 8–23)
CALCIUM SERPL-MCNC: 9.7 MG/DL (ref 8.8–10.4)
CHLORIDE SERPL-SCNC: 100 MMOL/L (ref 98–107)
CHOLEST SERPL-MCNC: 187 MG/DL
CREAT SERPL-MCNC: 0.83 MG/DL (ref 0.51–0.95)
EGFRCR SERPLBLD CKD-EPI 2021: 67 ML/MIN/1.73M2
EST. AVERAGE GLUCOSE BLD GHB EST-MCNC: 126 MG/DL
FASTING STATUS PATIENT QL REPORTED: YES
FASTING STATUS PATIENT QL REPORTED: YES
GLUCOSE SERPL-MCNC: 150 MG/DL (ref 70–99)
HBA1C MFR BLD: 6 % (ref 0–5.6)
HCO3 SERPL-SCNC: 24 MMOL/L (ref 22–29)
HDLC SERPL-MCNC: 48 MG/DL
LDLC SERPL CALC-MCNC: 118 MG/DL
NONHDLC SERPL-MCNC: 139 MG/DL
POTASSIUM SERPL-SCNC: 3.7 MMOL/L (ref 3.4–5.3)
SODIUM SERPL-SCNC: 135 MMOL/L (ref 135–145)
TRIGL SERPL-MCNC: 103 MG/DL
VIT D+METAB SERPL-MCNC: 28 NG/ML (ref 20–50)

## 2025-03-31 PROCEDURE — 99214 OFFICE O/P EST MOD 30 MIN: CPT | Mod: 25 | Performed by: PHYSICIAN ASSISTANT

## 2025-03-31 PROCEDURE — 1126F AMNT PAIN NOTED NONE PRSNT: CPT | Performed by: PHYSICIAN ASSISTANT

## 2025-03-31 PROCEDURE — 82306 VITAMIN D 25 HYDROXY: CPT | Performed by: PHYSICIAN ASSISTANT

## 2025-03-31 PROCEDURE — 3078F DIAST BP <80 MM HG: CPT | Performed by: PHYSICIAN ASSISTANT

## 2025-03-31 PROCEDURE — 83036 HEMOGLOBIN GLYCOSYLATED A1C: CPT | Performed by: PHYSICIAN ASSISTANT

## 2025-03-31 PROCEDURE — 3077F SYST BP >= 140 MM HG: CPT | Performed by: PHYSICIAN ASSISTANT

## 2025-03-31 PROCEDURE — 80061 LIPID PANEL: CPT | Performed by: PHYSICIAN ASSISTANT

## 2025-03-31 PROCEDURE — G0439 PPPS, SUBSEQ VISIT: HCPCS | Performed by: PHYSICIAN ASSISTANT

## 2025-03-31 PROCEDURE — 36415 COLL VENOUS BLD VENIPUNCTURE: CPT | Performed by: PHYSICIAN ASSISTANT

## 2025-03-31 PROCEDURE — 80048 BASIC METABOLIC PNL TOTAL CA: CPT | Performed by: PHYSICIAN ASSISTANT

## 2025-03-31 PROCEDURE — G2211 COMPLEX E/M VISIT ADD ON: HCPCS | Performed by: PHYSICIAN ASSISTANT

## 2025-03-31 RX ORDER — CETIRIZINE HYDROCHLORIDE 10 MG/1
10 TABLET ORAL DAILY
Qty: 30 TABLET | Refills: 11 | Status: SHIPPED | OUTPATIENT
Start: 2025-03-31

## 2025-03-31 RX ORDER — AMLODIPINE BESYLATE 10 MG/1
10 TABLET ORAL DAILY
Qty: 90 TABLET | Refills: 1 | Status: SHIPPED | OUTPATIENT
Start: 2025-03-31

## 2025-03-31 RX ORDER — CHLORTHALIDONE 25 MG/1
25 TABLET ORAL DAILY
Qty: 90 TABLET | Refills: 1 | Status: SHIPPED | OUTPATIENT
Start: 2025-03-31

## 2025-03-31 RX ORDER — HYDRALAZINE HYDROCHLORIDE 50 MG/1
50 TABLET, FILM COATED ORAL 2 TIMES DAILY
Qty: 180 TABLET | Refills: 1 | Status: SHIPPED | OUTPATIENT
Start: 2025-03-31

## 2025-03-31 SDOH — HEALTH STABILITY: PHYSICAL HEALTH: ON AVERAGE, HOW MANY DAYS PER WEEK DO YOU ENGAGE IN MODERATE TO STRENUOUS EXERCISE (LIKE A BRISK WALK)?: 5 DAYS

## 2025-03-31 SDOH — HEALTH STABILITY: PHYSICAL HEALTH: ON AVERAGE, HOW MANY MINUTES DO YOU ENGAGE IN EXERCISE AT THIS LEVEL?: 20 MIN

## 2025-03-31 ASSESSMENT — SOCIAL DETERMINANTS OF HEALTH (SDOH): HOW OFTEN DO YOU GET TOGETHER WITH FRIENDS OR RELATIVES?: ONCE A WEEK

## 2025-03-31 ASSESSMENT — PAIN SCALES - GENERAL: PAINLEVEL_OUTOF10: NO PAIN (0)

## 2025-03-31 ASSESSMENT — PATIENT HEALTH QUESTIONNAIRE - PHQ9
SUM OF ALL RESPONSES TO PHQ QUESTIONS 1-9: 4
10. IF YOU CHECKED OFF ANY PROBLEMS, HOW DIFFICULT HAVE THESE PROBLEMS MADE IT FOR YOU TO DO YOUR WORK, TAKE CARE OF THINGS AT HOME, OR GET ALONG WITH OTHER PEOPLE: NOT DIFFICULT AT ALL
SUM OF ALL RESPONSES TO PHQ QUESTIONS 1-9: 4

## 2025-03-31 NOTE — PATIENT INSTRUCTIONS
Recent DEXA bone scan is consistent with osteopenia, a precursor to osteoporosis. Encourage to take vitamin D 1000 IU daily, calcium 1200 mg daily (over the counter), and encourage low impact and weight bearing exercises (walking) to help with bone strength.      Start wearing compression stockings, keeping feet elevated at rest, and low sodium diet to help with leg swelling    Take ALL THREE blood pressure medication (amlodipine, chlorthalidone, and Hydralazine)  Follow up with MD in 3 months to recheck.     Please call [phone]  to schedule PHYSICAL THERAPY to help with Balance     Follow up in 3 months to recheck blood pressure     Patient Education   Preventive Care Advice   This is general advice given by our system to help you stay healthy. However, your care team may have specific advice just for you. Please talk to your care team about your preventive care needs.  Nutrition  Eat 5 or more servings of fruits and vegetables each day.  Try wheat bread, brown rice and whole grain pasta (instead of white bread, rice, and pasta).  Get enough calcium and vitamin D. Check the label on foods and aim for 100% of the RDA (recommended daily allowance).  Lifestyle  Exercise at least 150 minutes each week  (30 minutes a day, 5 days a week).  Do muscle strengthening activities 2 days a week. These help control your weight and prevent disease.  No smoking.  Wear sunscreen to prevent skin cancer.  Have a dental exam and cleaning every 6 months.  Yearly exams  See your health care team every year to talk about:  Any changes in your health.  Any medicines your care team has prescribed.  Preventive care, family planning, and ways to prevent chronic diseases.  Shots (vaccines)   HPV shots (up to age 26), if you've never had them before.  Hepatitis B shots (up to age 59), if you've never had them before.  COVID-19 shot: Get this shot when it's due.  Flu shot: Get a flu shot every year.  Tetanus shot: Get a tetanus shot  every 10 years.  Pneumococcal, hepatitis A, and RSV shots: Ask your care team if you need these based on your risk.  Shingles shot (for age 50 and up)  General health tests  Diabetes screening:  Starting at age 35, Get screened for diabetes at least every 3 years.  If you are younger than age 35, ask your care team if you should be screened for diabetes.  Cholesterol test: At age 39, start having a cholesterol test every 5 years, or more often if advised.  Bone density scan (DEXA): At age 50, ask your care team if you should have this scan for osteoporosis (brittle bones).  Hepatitis C: Get tested at least once in your life.  STIs (sexually transmitted infections)  Before age 24: Ask your care team if you should be screened for STIs.  After age 24: Get screened for STIs if you're at risk. You are at risk for STIs (including HIV) if:  You are sexually active with more than one person.  You don't use condoms every time.  You or a partner was diagnosed with a sexually transmitted infection.  If you are at risk for HIV, ask about PrEP medicine to prevent HIV.  Get tested for HIV at least once in your life, whether you are at risk for HIV or not.  Cancer screening tests  Cervical cancer screening: If you have a cervix, begin getting regular cervical cancer screening tests starting at age 21.  Breast cancer scan (mammogram): If you've ever had breasts, begin having regular mammograms starting at age 40. This is a scan to check for breast cancer.  Colon cancer screening: It is important to start screening for colon cancer at age 45.  Have a colonoscopy test every 10 years (or more often if you're at risk) Or, ask your provider about stool tests like a FIT test every year or Cologuard test every 3 years.  To learn more about your testing options, visit:   .  For help making a decision, visit:   https://bit.ly/ek66752.  Prostate cancer screening test: If you have a prostate, ask your care team if a prostate cancer screening  test (PSA) at age 55 is right for you.  Lung cancer screening: If you are a current or former smoker ages 50 to 80, ask your care team if ongoing lung cancer screenings are right for you.  For informational purposes only. Not to replace the advice of your health care provider. Copyright   2023 [employer]. All rights reserved. Clinically reviewed by the [facility]. Open mHealth 369313 - REV 01/24.  Learning About Being Active as an Older Adult  Why is being active important as you get older?     Being active is one of the best things you can do for your health. And it's never too late to start. Being active--or getting active, if you aren't already--has definite benefits. It can:  Give you more energy,  Keep your mind sharp.  Improve balance to reduce your risk of falls.  Help you manage chronic illness with fewer medicines.  No matter how old you are, how fit you are, or what health problems you have, there is a form of activity that will work for you. And the more physical activity you can do, the better your overall health will be.  What kinds of activity can help you stay healthy?  Being more active will make your daily activities easier. Physical activity includes planned exercise and things you do in daily life. There are four types of activity:  Aerobic.  Doing aerobic activity makes your heart and lungs strong.  Includes walking, dancing, and gardening.  Aim for at least 2  hours spread throughout the week.  It improves your energy and can help you sleep better.  Muscle-strengthening.  This type of activity can help maintain muscle and strengthen bones.  Includes climbing stairs, using resistance bands, and lifting or carrying heavy loads.  Aim for at least twice a week.  It can help protect the knees and other joints.  Stretching.  Stretching gives you better range of motion in joints and muscles.  Includes upper arm stretches, calf stretches, and gentle yoga.  Aim for  at least twice a week, preferably after your muscles are warmed up from other activities.  It can help you function better in daily life.  Balancing.  This helps you stay coordinated and have good posture.  Includes heel-to-toe walking, zee chi, and certain types of yoga.  Aim for at least 3 days a week.  It can reduce your risk of falling.  Even if you have a hard time meeting the recommendations, it's better to be more active than less active. All activity done in each category counts toward your weekly total. You'd be surprised how daily things like carrying groceries, keeping up with grandchildren, and taking the stairs can add up.  What keeps you from being active?  If you've had a hard time being more active, you're not alone. Maybe you remember being able to do more. Or maybe you've never thought of yourself as being active. It's frustrating when you can't do the things you want. Being more active can help. What's holding you back?  Getting started.  Have a goal, but break it into easy tasks. Small steps build into big accomplishments.  Staying motivated.  If you feel like skipping your activity, remember your goal. Maybe you want to move better and stay independent. Every activity gets you one step closer.  Not feeling your best.  Start with 5 minutes of an activity you enjoy. Prove to yourself you can do it. As you get comfortable, increase your time.  You may not be where you want to be. But you're in the process of getting there. Everyone starts somewhere.  How can you find safe ways to stay active?  Talk with your doctor about any physical challenges you're facing. Make a plan with your doctor if you have a health problem or aren't sure how to get started with activity.  If you're already active, ask your doctor if there is anything you should change to stay safe as your body and health change.  If you tend to feel dizzy after you take medicine, avoid activity at that time. Try being active before you  "take your medicine. This will reduce your risk of falls.  If you plan to be active at home, make sure to clear your space before you get started. Remove things like TV cords, coffee tables, and throw rugs. It's safest to have plenty of space to move freely.  The key to getting more active is to take it slow and steady. Try to improve only a little bit at a time. Pick just one area to improve on at first. And if an activity hurts, stop and talk to your doctor.  Where can you learn more?  Go to https://www.[employer].net/patiented  Enter P600 in the search box to learn more about \"Learning About Being Active as an Older Adult.\"  Current as of: July 31, 2024  Content Version: 14.4    7387-9870 [employer].   Care instructions adapted under license by your healthcare professional. If you have questions about a medical condition or this instruction, always ask your healthcare professional. [employer] disclaims any warranty or liability for your use of this information.    Eating Healthy Foods: Care Instructions  With every meal, you can make healthy food choices. Try to eat a variety of fruits, vegetables, whole grains, lean proteins, and low-fat dairy products. This can help you get the right balance of nutrients, including vitamins and minerals. Small changes add up over time. You can start by adding one healthy food to your meals each day.    Try to make half your plate fruits and vegetables, one-fourth whole grains, and one-fourth lean proteins. Try including dairy with your meals.   Eat more fruits and vegetables. Try to have them with most meals and snacks.   Foods for healthy eating        Fruits   These can be fresh, frozen, canned, or dried.  Try to choose whole fruit rather than fruit juice.  Eat a variety of colors.        Vegetables   These can be fresh, frozen, canned, or dried.  Beans, peas, and lentils count too.        Whole grains   Choose whole-grain breads, cereals, and " "noodles.  Try brown rice.        Lean proteins   These can include lean meat, poultry, fish, and eggs.  You can also have tofu, beans, peas, lentils, nuts, and seeds.        Dairy   Try milk, yogurt, and cheese.  Choose low-fat or fat-free when you can.  If you need to, use lactose-free milk or fortified plant-based milk products, such as soy milk.        Water   Drink water when you're thirsty.  Limit sugar-sweetened drinks, including soda, fruit drinks, and sports drinks.  Where can you learn more?  Go to https://www.[employer].net/patiented  Enter T756 in the search box to learn more about \"Eating Healthy Foods: Care Instructions.\"  Current as of: October 7, 2024  Content Version: 14.4    2029-4188 [employer].   Care instructions adapted under license by your healthcare professional. If you have questions about a medical condition or this instruction, always ask your healthcare professional. [employer] disclaims any warranty or liability for your use of this information.    Learning About Activities of Daily Living  What are activities of daily living?     Activities of daily living (ADLs) are the basic self-care tasks you do every day. These include eating, bathing, dressing, and moving around.  As you age, and if you have health problems, you may find that it's harder to do some of these tasks. If so, your doctor can suggest ideas that may help.  To measure what kind of help you may need, your doctor will ask how well you are able to do ADLs. Let your doctor know if there are any tasks that you are having trouble doing. This is an important first step to getting help. And when you have the help you need, you can stay as independent as possible.  How will a doctor assess your ADLs?  Asking about ADLs is part of a routine health checkup your doctor will likely do as you age. Your health check might be done in a doctor's office, in your home, or at a hospital. The goal is to find out if you " are having any problems that could make it hard to care for yourself or that make it unsafe for you to be on your own.  To measure your ADLs, your doctor will ask how hard it is for you to do routine tasks. Your doctor may also want to know if you have changed the way you do a task because of a health problem. Your doctor may watch how you:  Walk back and forth.  Keep your balance while you stand or walk.  Move from sitting to standing or from a bed to a chair.  Button or unbutton a shirt or sweater.  Remove and put on your shoes.  It's common to feel a little worried or anxious if you find you can't do all the things you used to be able to do. Talking with your doctor about ADLs is a way to make sure you're as safe as possible and able to care for yourself as well as you can. You may want to bring a caregiver, friend, or family member to your checkup. They can help you talk to your doctor.  Follow-up care is a key part of your treatment and safety. Be sure to make and go to all appointments, and call your doctor if you are having problems. It's also a good idea to know your test results and keep a list of the medicines you take.  Current as of: October 24, 2024  Content Version: 14.4    0258-6885 [employer].   Care instructions adapted under license by your healthcare professional. If you have questions about a medical condition or this instruction, always ask your healthcare professional. [employer] disclaims any warranty or liability for your use of this information.    Hearing Loss: Care Instructions  Overview     Hearing loss is a sudden or slow decrease in how well you hear. It can range from slight to profound. Permanent hearing loss can occur with aging. It also can happen when you are exposed long-term to loud noise. Examples include listening to loud music, riding motorcycles, or being around other loud machines.  Hearing loss can affect your work and home life. It can make you feel  lonely or depressed. You may feel that you have lost your independence. But hearing aids and other devices can help you hear better and feel connected to others.  Follow-up care is a key part of your treatment and safety. Be sure to make and go to all appointments, and call your doctor if you are having problems. It's also a good idea to know your test results and keep a list of the medicines you take.  How can you care for yourself at home?  Avoid loud noises whenever possible. This helps keep your hearing from getting worse.  Always wear hearing protection around loud noises.  Wear a hearing aid as directed.  A professional can help you pick a hearing aid that will work best for you.  You can also get hearing aids over the counter for mild to moderate hearing loss.  Have hearing tests as your doctor suggests. They can show whether your hearing has changed. Your hearing aid may need to be adjusted.  Use other devices as needed. These may include:  Telephone amplifiers and hearing aids that can connect to a television, stereo, radio, or microphone.  Devices that use lights or vibrations. These alert you to the doorbell, a ringing telephone, or a baby monitor.  Television closed-captioning. This shows the words at the bottom of the screen. Most new TVs can do this.  TTY (text telephone). This lets you type messages back and forth on the telephone instead of talking or listening. These devices are also called TDD. When messages are typed on the keyboard, they are sent over the phone line to a receiving TTY. The message is shown on a monitor.  Use text messaging, social media, and email if it is hard for you to communicate by telephone.  Try to learn a listening technique called speechreading. It is not lipreading. You pay attention to people's gestures, expressions, posture, and tone of voice. These clues can help you understand what a person is saying. Face the person you are talking to, and have them face you. Make  "sure the lighting is good. You need to see the other person's face clearly.  Think about counseling if you need help to adjust to your hearing loss.  When should you call for help?  Watch closely for changes in your health, and be sure to contact your doctor if:    You think your hearing is getting worse.     You have new symptoms, such as dizziness or nausea.   Where can you learn more?  Go to https://www.[employer].net/patiented  Enter R798 in the search box to learn more about \"Hearing Loss: Care Instructions.\"  Current as of: October 27, 2024  Content Version: 14.4    2437-5702 [employer].   Care instructions adapted under license by your healthcare professional. If you have questions about a medical condition or this instruction, always ask your healthcare professional. [employer] disclaims any warranty or liability for your use of this information.    Bladder Training: Care Instructions  Your Care Instructions     Bladder training is used to treat urge incontinence and stress incontinence. Urge incontinence means that the need to urinate comes on so fast that you can't get to a toilet in time. Stress incontinence means that you leak urine because of pressure on your bladder. For example, it may happen when you laugh, cough, or lift something heavy.  Bladder training can increase how long you can wait before you have to urinate. It can also help your bladder hold more urine. And it can give you better control over the urge to urinate.  It is important to remember that bladder training takes a few weeks to a few months to make a difference. You may not see results right away, but don't give up.  Follow-up care is a key part of your treatment and safety. Be sure to make and go to all appointments, and call your doctor if you are having problems. It's also a good idea to know your test results and keep a list of the medicines you take.  How can you care for yourself at home?  Work with your " doctor to come up with a bladder training program that is right for you. You may use one or more of the following methods.  Delayed urination  In the beginning, try to keep from urinating for 5 minutes after you first feel the need to go.  While you wait, take deep, slow breaths to relax. Kegel exercises can also help you delay the need to go to the bathroom.  After some practice, when you can easily wait 5 minutes to urinate, try to wait 10 minutes before you urinate.  Slowly increase the waiting period until you are able to control when you have to urinate.  Scheduled urination  Empty your bladder when you first wake up in the morning.  Schedule times throughout the day when you will urinate.  Start by going to the bathroom every hour, even if you don't need to go.  Slowly increase the time between trips to the bathroom.  When you have found a schedule that works well for you, keep doing it.  If you wake up during the night and have to urinate, do it. Apply your schedule to waking hours only.  Kegel exercises  These tighten and strengthen pelvic muscles, which can help you control the flow of urine. (If doing these exercises causes pain, stop doing them and talk with your doctor.) To do Kegel exercises:  Squeeze your muscles as if you were trying not to pass gas. Or squeeze your muscles as if you were stopping the flow of urine. Your belly, legs, and buttocks shouldn't move.  Hold the squeeze for 3 seconds, then relax for 5 to 10 seconds.  Start with 3 seconds, then add 1 second each week until you are able to squeeze for 10 seconds.  Repeat the exercise 10 times a session. Do 3 to 8 sessions a day.  When should you call for help?  Watch closely for changes in your health, and be sure to contact your doctor if:    Your incontinence is getting worse.     You do not get better as expected.   Where can you learn more?  Go to https://www.healthwise.net/patiented  Enter V684 in the search box to learn more about  "\"Bladder Training: Care Instructions.\"  Current as of: April 30, 2024  Content Version: 14.4    0406-4512 [employer].   Care instructions adapted under license by your healthcare professional. If you have questions about a medical condition or this instruction, always ask your healthcare professional. [employer] disclaims any warranty or liability for your use of this information.       "

## 2025-03-31 NOTE — PROGRESS NOTES
Preventive Care Visit  [facility]  MARCIA Piña, Physician Assistant - Medical  Mar 31, 2025      Assessment & Plan     Encounter for Medicare annual wellness exam  Repeat 1 year     Severe episode of recurrent major depressive disorder, without psychotic features (H)  Chronic, well-managed on no medications    Stage 3a chronic kidney disease (H)  Chronic, encouraged adequate hydration and avoiding any NSAIDs.  Managing chronic medical conditions such as blood pressure and diabetes. rechecking labs today    Uncontrolled hypertension  Chronic, uncontrolled hypertension.  Patient had not been taking one of the 3 medications listed below.  She is unaware which ones she had been taking and which ones she was not.  It seems that patient does have some memory issues with her medications.  I reiterated to patient today that is important to take all 3 meds to manage her blood pressure.  I wrote the medications down and gave her 200 visit summary so she can check at home.  Refills given.  Follow-up in 3 months to recheck blood pressure or sooner if needed.  - hydrALAZINE (APRESOLINE) 50 MG tablet; Take 1 tablet (50 mg) by mouth 2 times daily.  - chlorthalidone (HYGROTON) 25 MG tablet; Take 1 tablet (25 mg) by mouth daily.  - amLODIPine (NORVASC) 10 MG tablet; Take 1 tablet (10 mg) by mouth daily.  - Basic metabolic panel  (Ca, Cl, CO2, Creat, Gluc, K, Na, BUN); Future  - Basic metabolic panel  (Ca, Cl, CO2, Creat, Gluc, K, Na, BUN)    Allergic rhinitis, unspecified seasonality, unspecified trigger  Chronic, refills good for 1 year  - cetirizine (ZYRTEC) 10 MG tablet; Take 1 tablet (10 mg) by mouth daily.    Mild aortic stenosis  Chronic, noted on echo in 2023.  Advised to repeat by cardiology in 2027.  Murmur noted on exam today.  Patient asymptomatic    Vitamin D deficiency  Patient unaware of her previous diagnosis of vitamin D deficiency.  She is not currently taking any supplement.   "Rechecking labs.  I do encourage at least 1000 IUs daily due to her history of osteopenia.  - Vitamin D Deficiency; Future  - Vitamin D Deficiency    Osteopenia, unspecified location  Encourage patient to take her vitamin D and calcium supplement and to continue with weightbearing exercises.  Patient declined DEXA scan screenings    Pre-diabetes  Diet and exercise encouraged  - Hemoglobin A1c; Future  - Hemoglobin A1c    Dyslipidemia  Diet and exercise encouraged.  Is not on any medications at this time  - Lipid panel reflex to direct LDL Fasting; Future  - Lipid panel reflex to direct LDL Fasting    1+ pitting edema  Discussed with patient compression stockings, low-sodium diet, and keeping legs elevated at rest.  Warning signs to go to the emergency room.  Worsening symptoms please follow-up in clinic.  Patient agrees with this plan has no further questions.    Patient has been advised of split billing requirements and indicates understanding: Yes        BMI  Estimated body mass index is 33.61 kg/m  as calculated from the following:    Height as of this encounter: 1.651 m (5' 5\").    Weight as of this encounter: 91.6 kg (202 lb).   Weight management plan: Discussed healthy diet and exercise guidelines    Counseling  Appropriate preventive services were addressed with this patient via screening, questionnaire, or discussion as appropriate for fall prevention, nutrition, physical activity, Tobacco-use cessation, social engagement, weight loss and cognition.  Checklist reviewing preventive services available has been given to the patient.  Reviewed patient's diet, addressing concerns and/or questions.   The patient was instructed to see the dentist every 6 months.   Updated plan of care.  Patient reported difficulty with activities of daily living were addressed today.The patient was provided with written information regarding signs of hearing loss.   Information on urinary incontinence and treatment options given to " patient.           Kerry Dc is a 89 year old, presenting for the following:  Physical        3/31/2025    10:38 AM   Additional Questions       Accompanied by Care giver           HPI    Hypertension: Chronic, uncontrolled elevated blood pressure.  Patient has been prescribed 3 medications but states today she is only been taking 2 blood pressure medications.  She did not know she had to have 3.  She does not check her blood pressures at home.  She does try to stay active.  She does have a caregiver that comes over 5 days a week to help her with her medications so she is not sure why she is not taking all 3.  She denies any shortness of breath, chest pain, headaches, or vision changes.    Dyslipidemia: Chronic, patient has been monitoring with lab work but is not currently on any statin therapy at this time.  Her PCP and her decided to not start this.    Vitamin D deficiency: Patient was unaware she had a vitamin D deficiency diagnosis.  She does not currently take any vitamin D supplements.  She does have a history of osteopenia    Prediabetes: Chronic, trying to manage with diet and exercise.  Is not on any medications at this time.    Allergies: Chronic, takes Zyrtec daily.  She feels like this is well-managed.  No worsening symptoms of late.    Depression: Chronic, is not currently on any medications.  She feels like overall mental state is good.  She does live with family.  She stays active with her both body and mind by walking daily and doing crossword puzzles and jigsaw puzzles       Advance Care Planning  Patient has a Health Care Directive on file  Discussed advance care planning with patient.      3/31/2025   General Health   How would you rate your overall physical health? Excellent   Feel stress (tense, anxious, or unable to sleep) Not at all         3/31/2025   Nutrition   Diet: Regular (no restrictions)         3/31/2025   Exercise   Days per week of moderate/strenous exercise 5 days    Average minutes spent exercising at this level 20 min         3/31/2025   Social Factors   Frequency of gathering with friends or relatives Once a week   Worry food won't last until get money to buy more No   Food not last or not have enough money for food? No   Do you have housing? (Housing is defined as stable permanent housing and does not include staying ouside in a car, in a tent, in an abandoned building, in an overnight shelter, or couch-surfing.) Yes   Are you worried about losing your housing? No   Lack of transportation? No   Unable to get utilities (heat,electricity)? No         3/31/2025   Fall Risk   Fallen 2 or more times in the past year? No    Trouble with walking or balance? Yes    Gait Speed Test (Document in seconds) 8.34   Gait Speed Test Interpretation Greater than 5.01 seconds - ABNORMAL       Proxy-reported         3/31/2025   Activities of Daily Living- Home Safety   Needs help with the following daily activites Telephone use    Transportation    Laundry   Safety concerns in the home None of the above       Multiple values from one day are sorted in reverse-chronological order         3/31/2025   Dental   Dentist two times every year? (!) NO         3/31/2025   Hearing Screening   Hearing concerns? (!) I FEEL THAT PEOPLE ARE MUMBLING OR NOT SPEAKING CLEARLY.    (!) I NEED TO ASK PEOPLE TO SPEAK UP OR REPEAT THEMSELVES.    (!) IT'S HARDER TO UNDERSTAND WOMEN'S VOICES THAN MEN'S VOICES.    (!) IT'S HARD TO FOLLOW A CONVERSATION IN A NOISY RESTAURANT OR CROWDED ROOM.    (!) TROUBLE UNDESTANDING A SPEAKER IN A PUBLIC MEETING OR [employer] SERVICE.    (!) TROUBLE FOLLOWING DIALOGUE IN THE THEATHER.    (!) TROUBLE UNDERSTANDING SOFT OR WHISPERED SPEECH.    (!) TROUBLE UNDERSTANDING SPEECH ON THE TELEPHONE       Multiple values from one day are sorted in reverse-chronological order         3/31/2025   Driving Risk Screening   Patient/family members have concerns about driving No         3/31/2025    General Alertness/Fatigue Screening   Have you been more tired than usual lately? No         3/31/2025   Urinary Incontinence Screening   Bothered by leaking urine in past 6 months Yes         Today's PHQ-9 Score:       3/31/2025    10:42 AM   PHQ-9 SCORE   PHQ-9 Total Score MyChart 4 (Minimal depression)   PHQ-9 Total Score 4        Proxy-reported         3/31/2025   Substance Use   Alcohol more than 3/day or more than 7/wk Not Applicable   Do you have a current opioid prescription? No   How severe/bad is pain from 1 to 10? 0/10 (No Pain)   Do you use any other substances recreationally? No     Social History     Tobacco Use    Smoking status: Never     Passive exposure: Current    Smokeless tobacco: Never   Vaping Use    Vaping status: Never Used   Substance Use Topics    Alcohol use: No    Drug use: No          Mammogram Screening - Mammography discussed and declined          Reviewed and updated as needed this visit by Provider                      Current providers sharing in care for this patient include:  Patient Care Team:  Imelda Rivers NP as PCP - General  , Arlene as County Worker  Hesham Mullen MD as MD (Otolaryngology)  Ana Navarrete AuD as Audiologist (Audiology)  Hesham Mullen MD as Assigned Surgical Provider  Piotr Cook MD as MD (Cardiovascular Disease)  Imelda Rivers NP as Assigned PCP  Piotr Cook MD as Assigned Heart and Vascular Provider  Lavonne Castillo AuD as Audiologist (Audiology)    The following health maintenance items are reviewed in Epic and correct as of today:  Health Maintenance   Topic Date Due    INFLUENZA VACCINE (1) 09/01/2024    MEDICARE ANNUAL WELLNESS VISIT  11/22/2024    COVID-19 Vaccine (7 - 2024-25 season) 04/03/2025    ANNUAL REVIEW OF HM ORDERS  05/31/2025    LIPID  06/28/2025    MICROALBUMIN  06/28/2025    DTAP/TDAP/TD IMMUNIZATION (3 - Td or Tdap) 07/24/2025    BMP  09/12/2025    HEMOGLOBIN  09/12/2025    PHQ-9  09/30/2025     "FALL RISK ASSESSMENT  03/31/2026    ADVANCE CARE PLANNING  11/22/2028    DEXA  08/19/2034    DEPRESSION ACTION PLAN  Completed    Pneumococcal Vaccine: 50+ Years  Completed    URINALYSIS  Completed    ZOSTER IMMUNIZATION  Completed    HPV IMMUNIZATION  Aged Out    MENINGITIS IMMUNIZATION  Aged Out    RSV VACCINE  Discontinued         Review of Systems  Constitutional, HEENT, cardiovascular, pulmonary, GI, , musculoskeletal, neuro, skin, endocrine and psych systems are negative, except as otherwise noted.     Objective    Exam  BP (!) 151/58   Pulse 73   Temp 97.9  F (36.6  C) (Oral)   Resp 18   Ht 1.651 m (5' 5\")   Wt 91.6 kg (202 lb)   LMP  (LMP Unknown)   SpO2 96%   BMI 33.61 kg/m     Estimated body mass index is 33.61 kg/m  as calculated from the following:    Height as of this encounter: 1.651 m (5' 5\").    Weight as of this encounter: 91.6 kg (202 lb).    Physical Exam  GENERAL: alert and no distress, unsteady gait, needed help getting up on exam table, uses cane  EYES: Eyes grossly normal to inspection, PERRL and conjunctivae and sclerae normal  HENT: ear canals and TM's normal, nose and mouth without ulcers or lesions, poor dentition   NECK: no adenopathy, no asymmetry, masses, or scars  RESP: lungs clear to auscultation - no rales, rhonchi or wheezes  CV: regular rate and rhythm, normal S1 S2, no S3 or S4, 2/6 systolic murmur, click or rub  ABDOMEN: soft, nontender, no hepatosplenomegaly, no masses and bowel sounds normal  MS: no gross musculoskeletal defects noted, 1+ pitting edema  SKIN: no suspicious lesions or rashes  NEURO: Normal strength and tone, mentation intact and speech normal  PSYCH: mentation appears normal, affect normal/bright        3/31/2025   Mini Cog   Clock Draw Score 2 Normal   3 Item Recall 3 objects recalled   Mini Cog Total Score 5          The longitudinal plan of care for the diagnosis(es)/condition(s) as documented were addressed during this visit. Due to the added " complexity in care, I will continue to support Vanda in the subsequent management and with ongoing continuity of care.    Signed Electronically by: MARCIA Piña

## 2025-06-02 ENCOUNTER — PATIENT OUTREACH (OUTPATIENT)
Dept: CARE COORDINATION | Facility: CLINIC | Age: OVER 89
End: 2025-06-02
Payer: COMMERCIAL